# Patient Record
Sex: FEMALE | Race: WHITE | Employment: UNEMPLOYED | URBAN - METROPOLITAN AREA
[De-identification: names, ages, dates, MRNs, and addresses within clinical notes are randomized per-mention and may not be internally consistent; named-entity substitution may affect disease eponyms.]

---

## 2017-08-29 ENCOUNTER — CLINICAL SUPPORT (OUTPATIENT)
Dept: INTERNAL MEDICINE CLINIC | Age: 47
End: 2017-08-29

## 2017-08-29 VITALS
DIASTOLIC BLOOD PRESSURE: 113 MMHG | BODY MASS INDEX: 27.86 KG/M2 | HEART RATE: 82 BPM | SYSTOLIC BLOOD PRESSURE: 170 MMHG | RESPIRATION RATE: 16 BRPM | WEIGHT: 177.5 LBS | OXYGEN SATURATION: 96 % | TEMPERATURE: 98.1 F | HEIGHT: 67 IN

## 2017-08-29 DIAGNOSIS — Z01.30 BP CHECK: Primary | ICD-10-CM

## 2017-08-29 NOTE — PROGRESS NOTES
Nurse visit to recheck BP    1. Have you been to the ER, urgent care clinic since your last visit? Hospitalized since your last visit? No    2. Have you seen or consulted any other health care providers outside of the [address] [place] [address] since your last visit? Include any pap smears or colon screening.  No

## 2017-11-14 ENCOUNTER — OFFICE VISIT (OUTPATIENT)
Dept: INTERNAL MEDICINE CLINIC | Age: 47
End: 2017-11-14

## 2017-11-14 VITALS
DIASTOLIC BLOOD PRESSURE: 130 MMHG | BODY MASS INDEX: 27.15 KG/M2 | WEIGHT: 173 LBS | OXYGEN SATURATION: 96 % | SYSTOLIC BLOOD PRESSURE: 210 MMHG | HEIGHT: 67 IN | TEMPERATURE: 97.8 F | HEART RATE: 89 BPM | RESPIRATION RATE: 16 BRPM

## 2017-11-14 DIAGNOSIS — F41.9 ANXIETY AND DEPRESSION: ICD-10-CM

## 2017-11-14 DIAGNOSIS — I10 BENIGN ESSENTIAL HTN: ICD-10-CM

## 2017-11-14 DIAGNOSIS — J44.9 CHRONIC OBSTRUCTIVE PULMONARY DISEASE, UNSPECIFIED COPD TYPE (HCC): Primary | ICD-10-CM

## 2017-11-14 DIAGNOSIS — F32.A ANXIETY AND DEPRESSION: ICD-10-CM

## 2017-11-14 LAB
CHOLEST SERPL-MCNC: 122 MG/DL
HDLC SERPL-MCNC: 45 MG/DL
LDL CHOLESTEROL POC: 40 MG/DL
NON-HDL GOAL (POC): 77
TCHOL/HDL RATIO (POC): 2.7
TRIGL SERPL-MCNC: 182 MG/DL

## 2017-11-14 RX ORDER — CANDESARTAN 32 MG/1
32 TABLET ORAL DAILY
Qty: 30 TAB | Refills: 3 | Status: SHIPPED | OUTPATIENT
Start: 2017-11-14 | End: 2018-02-19 | Stop reason: SDUPTHER

## 2017-11-14 RX ORDER — ALPRAZOLAM 0.5 MG/1
0.5 TABLET ORAL 2 TIMES DAILY
Qty: 30 TAB | Refills: 0 | Status: CANCELLED | OUTPATIENT
Start: 2017-11-14

## 2017-11-14 RX ORDER — BUDESONIDE AND FORMOTEROL FUMARATE DIHYDRATE 160; 4.5 UG/1; UG/1
2 AEROSOL RESPIRATORY (INHALATION) 2 TIMES DAILY
Qty: 1 INHALER | Refills: 4 | Status: SHIPPED | OUTPATIENT
Start: 2017-11-14 | End: 2018-02-19 | Stop reason: SDUPTHER

## 2017-11-14 RX ORDER — CHLORTHALIDONE 25 MG/1
25 TABLET ORAL DAILY
Qty: 30 TAB | Refills: 3 | Status: SHIPPED | OUTPATIENT
Start: 2017-11-14 | End: 2018-02-19 | Stop reason: SDUPTHER

## 2017-11-14 RX ORDER — AMLODIPINE BESYLATE 5 MG/1
5 TABLET ORAL DAILY
Qty: 30 TAB | Refills: 3 | Status: SHIPPED | OUTPATIENT
Start: 2017-11-14 | End: 2018-05-17 | Stop reason: SDUPTHER

## 2017-11-14 RX ORDER — ALPRAZOLAM 0.5 MG/1
0.5 TABLET ORAL
Qty: 30 TAB | Refills: 0 | Status: SHIPPED | OUTPATIENT
Start: 2017-11-14 | End: 2017-12-06 | Stop reason: DRUGHIGH

## 2017-11-14 RX ORDER — ALBUTEROL SULFATE 90 UG/1
1 AEROSOL, METERED RESPIRATORY (INHALATION)
Qty: 1 INHALER | Refills: 3 | Status: SHIPPED | OUTPATIENT
Start: 2017-11-14 | End: 2018-02-19 | Stop reason: SDUPTHER

## 2017-11-14 NOTE — PROGRESS NOTES
Jaret Arnold is a 41 y.o. female and presents with No chief complaint on file. .  Subjective:  Hypertension Review:  The patient has essential hypertension  Diet and Lifestyle: generally follows a  low sodium diet, exercises sporadically  Home BP Monitoring: is not measured at home. Pertinent ROS: taking medications as instructed, no medication side effects noted, no TIA's, no chest pain on exertion, no dyspnea on exertion, no swelling of ankles. COPD REVIEW:  The patient is being seen for follow up of COPD,the patient has been stable  Oxygen: She currently is not on home oxygen therapy. Symptoms: chronic dyspnea: severity = not present: course of sx: stable. Patient uses 2 pillows at night. Patient does continue to smoke cigarettes. Anxiety review:  Patient complains of palpitations, sweating, paresthesias, insomnia, racing thoughts, psychomotor agitation, feelings of losing control, difficulty concentrating  Treatment includes Xanax and no other therapies. She denies recurrent thoughts of death and suicidal thoughts without plan. She experiences the following side effects from the treatment: none. Review of Systems  Constitutional: negative for fevers, chills, anorexia and weight loss  Eyes:   negative for visual disturbance and irritation  ENT:   negative for tinnitus,owing side effects from the treatment:  sore throat,nasal congestion,ear pains. hoarseness  Respiratory:  negative for cough, hemoptysis, dyspnea,wheezing  CV:   negative for chest pain, palpitations, lower extremity edema  GI:   negative for nausea, vomiting, diarrhea, abdominal pain,melena  Endo:               negative for polyuria,polydipsia,polyphagia,heat intolerance  Genitourinary: negative for frequency, dysuria and hematuria  Integument:  negative for rash and pruritus  Hematologic:  negative for easy bruising and gum/nose bleeding  Musculoskel: negative for myalgias, arthralgias, back pain, muscle weakness, joint pain  Neurological:  negative for headaches, dizziness, vertigo, memory problems and gait   Behavl/Psych: negative for feelings of anxiety, depression, mood changes    Past Medical History:   Diagnosis Date    Anemia 2012    Essential hypertension     HX OTHER MEDICAL     2009 baby put up for adoption, tested + for cocaine with that pregnancy    Hypertension      Past Surgical History:   Procedure Laterality Date    HX CHOLECYSTECTOMY      HX GYN           Social History     Social History    Marital status: LEGALLY      Spouse name: N/A    Number of children: N/A    Years of education: N/A     Social History Main Topics    Smoking status: Current Every Day Smoker     Packs/day: 0.50    Smokeless tobacco: Never Used    Alcohol use Yes    Drug use: No    Sexual activity: Yes     Birth control/ protection: None     Other Topics Concern    None     Social History Narrative     Family History   Problem Relation Age of Onset    Hypertension Father      Current Outpatient Prescriptions   Medication Sig Dispense Refill    candesartan (ATACAND) 32 mg tablet Take 1 Tab by mouth daily. 30 Tab 3    chlorthalidone (HYGROTEN) 25 mg tablet Take 1 Tab by mouth daily. 30 Tab 3    amLODIPine (NORVASC) 5 mg tablet Take 1 Tab by mouth daily. 30 Tab 3    budesonide-formoterol (SYMBICORT) 160-4.5 mcg/actuation HFAA Take 2 Puffs by inhalation two (2) times a day. 1 Inhaler 4    albuterol (PROVENTIL HFA, VENTOLIN HFA, PROAIR HFA) 90 mcg/actuation inhaler Take 1 Puff by inhalation every six (6) hours as needed for Wheezing or Shortness of Breath (cough). 1 Inhaler 3    ALPRAZolam (XANAX) 0.5 mg tablet Take 1 Tab by mouth nightly as needed for Anxiety. Max Daily Amount: 0.5 mg. 30 Tab 0    albuterol-ipratropium (DUO-NEB) 2.5 mg-0.5 mg/3 ml nebu 3 mL by Nebulization route every six (6) hours as needed.  50 mL 0     Allergies   Allergen Reactions    Hydralazine Unknown (comments)     jitteriness  Hydromorphone Itching     morphine    Morphine Itching    Sulfa (Sulfonamide Antibiotics) Nausea and Vomiting       Objective:  Visit Vitals    BP (!) 210/130    Pulse 89    Temp 97.8 °F (36.6 °C) (Oral)    Resp 16    Ht 5' 7\" (1.702 m)    Wt 173 lb (78.5 kg)    SpO2 96%    BMI 27.1 kg/m2     Physical Exam:   General appearance - alert, well appearing, and in no distress  Mental status - alert, oriented to person, place, and time  EYE-ROCKY, EOMI, corneas normal, no foreign bodies  ENT-ENT exam normal, no neck nodes or sinus tenderness  Nose - normal and patent, no erythema, discharge or polyps  Mouth - mucous membranes moist, pharynx normal without lesions  Neck - supple, no significant adenopathy   Chest - clear to auscultation, no wheezes, rales or rhonchi, symmetric air entry   Heart - normal rate, regular rhythm, normal S1, S2, no murmurs, rubs, clicks or gallops   Abdomen - soft, nontender, nondistended, no masses or organomegaly  Lymph- no adenopathy palpable  Ext-peripheral pulses normal, no pedal edema, no clubbing or cyanosis  Skin-Warm and dry. no hyperpigmentation, vitiligo, or suspicious lesions  Neuro -alert, oriented, normal speech, no focal findings or movement disorder noted  Neck-normal C-spine, no tenderness, full ROM without pain  Feet-no nail deformities or callus formation with good pulses noted      Results for orders placed or performed in visit on 11/14/17   AMB POC LIPID PROFILE   Result Value Ref Range    Cholesterol (POC) 122     Triglycerides (POC) 182     HDL Cholesterol (POC) 45     Non-HDL Goal (POC) 77     LDL Cholesterol (POC) 40 MG/DL    TChol/HDL Ratio (POC) 2.7        Assessment/Plan:    ICD-10-CM ICD-9-CM    1. Chronic obstructive pulmonary disease, unspecified COPD type (HCC) J44.9 496 albuterol (PROVENTIL HFA, VENTOLIN HFA, PROAIR HFA) 90 mcg/actuation inhaler   2.  Benign essential HTN I10 401.1 candesartan (ATACAND) 32 mg tablet      chlorthalidone (HYGROTEN) 25 mg tablet      amLODIPine (NORVASC) 5 mg tablet      budesonide-formoterol (SYMBICORT) 160-4.5 mcg/actuation HFAA      AMB POC LIPID PROFILE   3. Anxiety and depression F41.8 300.00 ALPRAZolam (XANAX) 0.5 mg tablet     311      Orders Placed This Encounter    AMB POC LIPID PROFILE    candesartan (ATACAND) 32 mg tablet     Sig: Take 1 Tab by mouth daily. Dispense:  30 Tab     Refill:  3    chlorthalidone (HYGROTEN) 25 mg tablet     Sig: Take 1 Tab by mouth daily. Dispense:  30 Tab     Refill:  3    amLODIPine (NORVASC) 5 mg tablet     Sig: Take 1 Tab by mouth daily. Dispense:  30 Tab     Refill:  3    budesonide-formoterol (SYMBICORT) 160-4.5 mcg/actuation HFAA     Sig: Take 2 Puffs by inhalation two (2) times a day. Dispense:  1 Inhaler     Refill:  4    albuterol (PROVENTIL HFA, VENTOLIN HFA, PROAIR HFA) 90 mcg/actuation inhaler     Sig: Take 1 Puff by inhalation every six (6) hours as needed for Wheezing or Shortness of Breath (cough). Dispense:  1 Inhaler     Refill:  3    ALPRAZolam (XANAX) 0.5 mg tablet     Sig: Take 1 Tab by mouth nightly as needed for Anxiety. Max Daily Amount: 0.5 mg.     Dispense:  30 Tab     Refill:  0     increase physical activity, stop smoking (advice and handout given), follow low fat diet, follow low salt diet,Take 81mg aspirin daily  Patient Instructions   Acme Packet Activation    Thank you for requesting access to Acme [employer]. Please follow the instructions below to securely access and download your online medical record. Acme Packet allows you to send messages to your doctor, view your test results, renew your prescriptions, schedule appointments, and more. How Do I Sign Up? 1. In your internet browser, go to [email or web address]  2. Click on the First Time User? Click Here link in the Sign In box. You will be redirect to the New Member Sign Up page. 3. Enter your Acme Packet Access Code exactly as it appears below.  You will not need to use this code after youve completed the sign-up process. If you do not sign up before the expiration date, you must request a new code. [employer] Access Code: [email or web address]  Expires: 2017  8:47 AM (This is the date your [employer] access code will )    4. Enter the last four digits of your Social Security Number (xxxx) and Date of Birth (mm/dd/yyyy) as indicated and click Submit. You will be taken to the next sign-up page. 5. Create a Formative Labst ID. This will be your [employer] login ID and cannot be changed, so think of one that is secure and easy to remember. 6. Create a [employer] password. You can change your password at any time. 7. Enter your Password Reset Question and Answer. This can be used at a later time if you forget your password. 8. Enter your e-mail address. You will receive e-mail notification when new information is available in [address] 9. Click Sign Up. You can now view and download portions of your medical record. 10. Click the Download Summary menu link to download a portable copy of your medical information. Additional Information    If you have questions, please visit the Frequently Asked Questions section of the [employer] website at [email or web address] Remember, [employer] is NOT to be used for urgent needs. For medical emergencies, dial 911. Follow-up Disposition:  Return in about 1 week (around 2017), or if symptoms worsen or fail to improve. I have reviewed with the patient details of the assessment and plan and all questions were answered. Relevent patient education was performed. The most recent lab findings were reviewed with the patient. An After Visit Summary was printed and given to the patient.

## 2017-11-14 NOTE — MR AVS SNAPSHOT
Visit Information Date & Time Provider Department Dept. Phone Encounter #  
 11/14/2017  9:15 AM Cha Sosa MD [address] [place] [address] [phone] [id number] Follow-up Instructions Return in about 1 week (around 11/21/2017), or if symptoms worsen or fail to improve. Upcoming Health Maintenance Date Due  
 PAP AKA CERVICAL CYTOLOGY 4/4/1993 DTaP/Tdap/Td series (2 - Td) 8/11/2022 Allergies as of 11/14/2017  Review Complete On: 11/14/2017 By: Cha Sosa MD  
  
 Severity Noted Reaction Type Reactions Hydralazine  02/23/2012   Intolerance Unknown (comments)  
 jitteriness Hydromorphone  06/01/2011    Itching  
 morphine Morphine  02/14/2012    Itching Sulfa (Sulfonamide Antibiotics)  06/01/2011    Nausea and Vomiting Current Immunizations  Reviewed on 2/16/2012 Name Date Influenza Vaccine Split  Deferred (Patient Refused) RHOGAM INJECTION 2/17/2012  8:45 PM  
 TDAP Vaccine 8/11/2012  7:15 AM  
  
 Not reviewed this visit You Were Diagnosed With   
  
 Codes Comments Chronic obstructive pulmonary disease, unspecified COPD type ([facility]    -  Primary ICD-10-CM: J44.9 ICD-9-CM: 582 Benign essential HTN     ICD-10-CM: I10 
ICD-9-CM: 401.1 Anxiety and depression     ICD-10-CM: F41.8 ICD-9-CM: 300.00, 311 Vitals BP Pulse Temp Resp Height(growth percentile) Weight(growth percentile) (!) 210/130 89 97.8 °F (36.6 °C) (Oral) 16 5' 7\" (1.702 m) 173 lb (78.5 kg) SpO2 BMI OB Status Smoking Status 96% 27.1 kg/m2 Having regular periods Current Every Day Smoker Vitals History BMI and BSA Data Body Mass Index Body Surface Area  
 27.1 kg/m 2 1.93 m 2 [facility] [address], [address] [phone] Your Updated Medication List  
  
   
 This list is accurate as of: 11/14/17 10:12 AM.  Always use your most recent med list.  
  
  
  
  
 albuterol 90 mcg/actuation inhaler Commonly known as:  PROVENTIL HFA, VENTOLIN HFA, PROAIR HFA Take 1 Puff by inhalation every six (6) hours as needed for Wheezing or Shortness of Breath (cough). albuterol-ipratropium 2.5 mg-0.5 mg/3 ml Nebu Commonly known as:  DUO-NEB  
3 mL by Nebulization route every six (6) hours as needed. ALPRAZolam 0.5 mg tablet Commonly known as:  [place] Amend Take 1 Tab by mouth nightly as needed for Anxiety. Max Daily Amount: 0.5 mg.  
  
 amLODIPine 5 mg tablet Commonly known as:  Eusebia Croon Take 1 Tab by mouth daily. budesonide-formoterol 160-4.5 mcg/actuation Hfaa Commonly known as:  SYMBICORT Take 2 Puffs by inhalation two (2) times a day. candesartan 32 mg tablet Commonly known as:  ATACAND Take 1 Tab by mouth daily. chlorthalidone 25 mg tablet Commonly known as:  Lesli Bolk Take 1 Tab by mouth daily. Prescriptions Printed Refills ALPRAZolam (XANAX) 0.5 mg tablet 0 Sig: Take 1 Tab by mouth nightly as needed for Anxiety. Max Daily Amount: 0.5 mg.  
 Class: Print Route: Oral  
  
Prescriptions Sent to Pharmacy Refills  
 candesartan (ATACAND) 32 mg tablet 3 Sig: Take 1 Tab by mouth daily. Class: Normal  
 Pharmacy: [facility] [address] [facility], [address] [place] [address] Ph #: [phone] Route: Oral  
 chlorthalidone (HYGROTEN) 25 mg tablet 3 Sig: Take 1 Tab by mouth daily. Class: Normal  
 Pharmacy: [facility] [address], [address] [place] [address] Ph #: [phone] Route: Oral  
 amLODIPine (NORVASC) 5 mg tablet 3 Sig: Take 1 Tab by mouth daily. Class: Normal  
 Pharmacy: [facility] [address], [address] [place] [address] Ph #: [phone]  Route: Oral  
 budesonide-formoterol (SYMBICORT) 160-4.5 mcg/actuation HFAA 4  
 Sig: Take 2 Puffs by inhalation two (2) times a day. Class: Normal  
 Pharmacy: [facility] [address] [facility], [address] [place] [address] Ph #: [phone] Route: Inhalation  
 albuterol (PROVENTIL HFA, VENTOLIN HFA, PROAIR HFA) 90 mcg/actuation inhaler 3 Sig: Take 1 Puff by inhalation every six (6) hours as needed for Wheezing or Shortness of Breath (cough). Class: Normal  
 Pharmacy: [facility] [address] [place] [address] Ph #: [phone] Route: Inhalation We Performed the Following AMB POC LIPID PROFILE [07185 CPT(R)] Follow-up Instructions Return in about 1 week (around 2017), or if symptoms worsen or fail to improve. Patient Instructions MyChart Activation Thank you for requesting access to MobileOCT. Please follow the instructions below to securely access and download your online medical record. MobileOCT allows you to send messages to your doctor, view your test results, renew your prescriptions, schedule appointments, and more. How Do I Sign Up? 1. In your internet browser, go to [email or web address] 
2. Click on the First Time User? Click Here link in the Sign In box. You will be redirect to the New Member Sign Up page. 3. Enter your MobileOCT Access Code exactly as it appears below. You will not need to use this code after youve completed the sign-up process. If you do not sign up before the expiration date, you must request a new code. [employer] Access Code: [email or web address] Expires: 2017  8:47 AM (This is the date your MobileOCT access code will ) 4. Enter the last four digits of your Social Security Number (xxxx) and Date of Birth (mm/dd/yyyy) as indicated and click Submit. You will be taken to the next sign-up page. 5. Create a MobileOCT ID. This will be your MobileOCT login ID and cannot be changed, so think of one that is secure and easy to remember. 6. Create a "[employer] password. You can change your password at any time. 7. Enter your Password Reset Question and Answer. This can be used at a later time if you forget your password. 8. Enter your e-mail address. You will receive e-mail notification when new information is available in [address] 9. Click Sign Up. You can now view and download portions of your medical record. 10. Click the Download Summary menu link to download a portable copy of your medical information. Additional Information If you have questions, please visit the Frequently Asked Questions section of the "[employer] website at [email or web address] Remember, "[employer] is NOT to be used for urgent needs. For medical emergencies, dial 911. Introducing [facility] SERVICES! Barbara Savage introduces "[employer] patient portal. Now you can access parts of your medical record, email your doctor's office, and request medication refills online. 1. In your internet browser, go to [email or web address] 2. Click on the First Time User? Click Here link in the Sign In box. You will see the New Member Sign Up page. 3. Enter your "[employer] Access Code exactly as it appears below. You will not need to use this code after youve completed the sign-up process. If you do not sign up before the expiration date, you must request a new code. · "[employer] Access Code: [email or web address] Expires: 11/27/2017  8:47 AM 
 
4. Enter the last four digits of your Social Security Number (xxxx) and Date of Birth (mm/dd/yyyy) as indicated and click Submit. You will be taken to the next sign-up page. 5. Create a "[employer] ID. This will be your "[employer] login ID and cannot be changed, so think of one that is secure and easy to remember. 6. Create a "[employer] password. You can change your password at any time. 7. Enter your Password Reset Question and Answer. This can be used at a later time if you forget your password. 8. Enter your e-mail address. You will receive e-mail notification when new information is available in [address] 9. Click Sign Up. You can now view and download portions of your medical record. 10. Click the Download Summary menu link to download a portable copy of your medical information. If you have questions, please visit the Frequently Asked Questions section of the [employer] website. Remember, [employer] is NOT to be used for urgent needs. For medical emergencies, dial 911. Now available from your iPhone and Android! Please provide this summary of care documentation to your next provider. Your primary care clinician is listed as Bang Fillers. If you have any questions after today's visit, please call [phone].

## 2017-11-14 NOTE — PATIENT INSTRUCTIONS
Reorg Research Activation    Thank you for requesting access to [employer]. Please follow the instructions below to securely access and download your online medical record. [employer] allows you to send messages to your doctor, view your test results, renew your prescriptions, schedule appointments, and more. How Do I Sign Up? 1. In your internet browser, go to [email or web address]  2. Click on the First Time User? Click Here link in the Sign In box. You will be redirect to the New Member Sign Up page. 3. Enter your [employer] Access Code exactly as it appears below. You will not need to use this code after youve completed the sign-up process. If you do not sign up before the expiration date, you must request a new code. [employer] Access Code: [email or web address]  Expires: 2017  8:47 AM (This is the date your [employer] access code will )    4. Enter the last four digits of your Social Security Number (xxxx) and Date of Birth (mm/dd/yyyy) as indicated and click Submit. You will be taken to the next sign-up page. 5. Create a Re[employer] Research ID. This will be your [employer] login ID and cannot be changed, so think of one that is secure and easy to remember. 6. Create a Re[employer] password. You can change your password at any time. 7. Enter your Password Reset Question and Answer. This can be used at a later time if you forget your password. 8. Enter your e-mail address. You will receive e-mail notification when new information is available in [address] 9. Click Sign Up. You can now view and download portions of your medical record. 10. Click the Download Summary menu link to download a portable copy of your medical information. Additional Information    If you have questions, please visit the Frequently Asked Questions section of the Re[employer] Research website at [email or web address] Remember, [employer] is NOT to be used for urgent needs. For medical emergencies, dial 911.

## 2017-11-27 ENCOUNTER — OFFICE VISIT (OUTPATIENT)
Dept: INTERNAL MEDICINE CLINIC | Age: 47
End: 2017-11-27

## 2017-11-27 VITALS
WEIGHT: 174 LBS | OXYGEN SATURATION: 97 % | DIASTOLIC BLOOD PRESSURE: 82 MMHG | TEMPERATURE: 98.2 F | BODY MASS INDEX: 27.31 KG/M2 | SYSTOLIC BLOOD PRESSURE: 144 MMHG | HEIGHT: 67 IN | HEART RATE: 74 BPM

## 2017-11-27 DIAGNOSIS — F41.9 ANXIETY AND DEPRESSION: ICD-10-CM

## 2017-11-27 DIAGNOSIS — J44.9 CHRONIC OBSTRUCTIVE PULMONARY DISEASE, UNSPECIFIED COPD TYPE (HCC): ICD-10-CM

## 2017-11-27 DIAGNOSIS — F32.A ANXIETY AND DEPRESSION: ICD-10-CM

## 2017-11-27 DIAGNOSIS — I10 BENIGN ESSENTIAL HTN: Primary | ICD-10-CM

## 2017-11-27 NOTE — PATIENT INSTRUCTIONS
BioActor Activation    Thank you for requesting access to BioActor. Please follow the instructions below to securely access and download your online medical record. BioActor allows you to send messages to your doctor, view your test results, renew your prescriptions, schedule appointments, and more. How Do I Sign Up? 1. In your internet browser, go to [email or web address]  2. Click on the First Time User? Click Here link in the Sign In box. You will be redirect to the New Member Sign Up page. 3. Enter your BioActor Access Code exactly as it appears below. You will not need to use this code after youve completed the sign-up process. If you do not sign up before the expiration date, you must request a new code. BioActor Access Code: [email or web address]  Expires: 2018  3:57 PM (This is the date your BioActor access code will )    4. Enter the last four digits of your Social Security Number (xxxx) and Date of Birth (mm/dd/yyyy) as indicated and click Submit. You will be taken to the next sign-up page. 5. Create a BioActor ID. This will be your BioActor login ID and cannot be changed, so think of one that is secure and easy to remember. 6. Create a BioActor password. You can change your password at any time. 7. Enter your Password Reset Question and Answer. This can be used at a later time if you forget your password. 8. Enter your e-mail address. You will receive e-mail notification when new information is available in [address] 9. Click Sign Up. You can now view and download portions of your medical record. 10. Click the Download Summary menu link to download a portable copy of your medical information. Additional Information    If you have questions, please visit the Frequently Asked Questions section of the BioActor website at [email or web address] Remember, BioActor is NOT to be used for urgent needs. For medical emergencies, dial 911.

## 2017-11-27 NOTE — PROGRESS NOTES
Mireya Parsons is a 41 y.o. female and presents with Hypertension  . Subjective:  Hypertension Review:  The patient has essential hypertension,she has been stable  Diet and Lifestyle: generally follows a  low sodium diet, exercises sporadically  Home BP Monitoring: is not measured at home. Pertinent ROS: taking medications as instructed, no medication side effects noted, no TIA's, no chest pain on exertion, no dyspnea on exertion, no swelling of ankles. COPD REVIEW:  The patient is being seen for follow up of COPD,the patient has been stable  Oxygen: She currently is not on home oxygen therapy. Symptoms: chronic dyspnea: severity = not present: course of sx: stable. Patient uses 2 pillows at night. Patient does continue to smoke cigarettes. Anxiety review:  Patient complains of palpitations, sweating, paresthesias, insomnia, racing thoughts, psychomotor agitation, feelings of losing control, difficulty concentrating  Treatment includes Xanax and no other therapies. She denies recurrent thoughts of death and suicidal thoughts without plan. She experiences the following side effects from the treatment: none. She has been tolerating the Xanax . Review of Systems  Constitutional: negative for fevers, chills, anorexia and weight loss  Eyes:   negative for visual disturbance and irritation  ENT:   negative for tinnitus,owing side effects from the treatment:  sore throat,nasal congestion,ear pains. hoarseness  Respiratory:  negative for cough, hemoptysis, dyspnea,wheezing  CV:   negative for chest pain, palpitations, lower extremity edema  GI:   negative for nausea, vomiting, diarrhea, abdominal pain,melena  Endo:               negative for polyuria,polydipsia,polyphagia,heat intolerance  Genitourinary: negative for frequency, dysuria and hematuria  Integument:  negative for rash and pruritus  Hematologic:  negative for easy bruising and gum/nose bleeding  Musculoskel: negative for myalgias, arthralgias, back pain, muscle weakness, joint pain  Neurological:  negative for headaches, dizziness, vertigo, memory problems and gait   Behavl/Psych: negative for feelings of anxiety, depression, mood changes    Past Medical History:   Diagnosis Date    Anemia 2012    Essential hypertension     HX OTHER MEDICAL     2009 baby put up for adoption, tested + for cocaine with that pregnancy    Hypertension      Past Surgical History:   Procedure Laterality Date    HX CHOLECYSTECTOMY      HX GYN           Social History     Social History    Marital status: LEGALLY      Spouse name: N/A    Number of children: N/A    Years of education: N/A     Social History Main Topics    Smoking status: Current Every Day Smoker     Packs/day: 0.50    Smokeless tobacco: Never Used    Alcohol use Yes    Drug use: No    Sexual activity: Yes     Birth control/ protection: None     Other Topics Concern    None     Social History Narrative     Family History   Problem Relation Age of Onset    Hypertension Father      Current Outpatient Prescriptions   Medication Sig Dispense Refill    candesartan (ATACAND) 32 mg tablet Take 1 Tab by mouth daily. 30 Tab 3    chlorthalidone (HYGROTEN) 25 mg tablet Take 1 Tab by mouth daily. 30 Tab 3    amLODIPine (NORVASC) 5 mg tablet Take 1 Tab by mouth daily. 30 Tab 3    budesonide-formoterol (SYMBICORT) 160-4.5 mcg/actuation HFAA Take 2 Puffs by inhalation two (2) times a day. 1 Inhaler 4    albuterol (PROVENTIL HFA, VENTOLIN HFA, PROAIR HFA) 90 mcg/actuation inhaler Take 1 Puff by inhalation every six (6) hours as needed for Wheezing or Shortness of Breath (cough). 1 Inhaler 3    ALPRAZolam (XANAX) 0.5 mg tablet Take 1 Tab by mouth nightly as needed for Anxiety. Max Daily Amount: 0.5 mg. 30 Tab 0    albuterol-ipratropium (DUO-NEB) 2.5 mg-0.5 mg/3 ml nebu 3 mL by Nebulization route every six (6) hours as needed.  50 mL 0     Allergies   Allergen Reactions    Hydralazine Unknown (comments)     jitteriness    Hydromorphone Itching     morphine    Morphine Itching    Sulfa (Sulfonamide Antibiotics) Nausea and Vomiting       Objective:  Visit Vitals    /82 (BP 1 Location: Left arm, BP Patient Position: Sitting)    Pulse 74    Temp 98.2 °F (36.8 °C) (Oral)    Ht 5' 7\" (1.702 m)    Wt 174 lb (78.9 kg)    LMP 10/24/2017    SpO2 97%    BMI 27.25 kg/m2     Physical Exam:   General appearance - alert, well appearing, and in no distress  Mental status - alert, oriented to person, place, and time  EYE-ROCKY, EOMI, corneas normal, no foreign bodies  ENT-ENT exam normal, no neck nodes or sinus tenderness  Nose - normal and patent, no erythema, discharge or polyps  Mouth - mucous membranes moist, pharynx normal without lesions  Neck - supple, no significant adenopathy   Chest - clear to auscultation, no wheezes, rales or rhonchi, symmetric air entry   Heart - normal rate, regular rhythm, normal S1, S2, no murmurs, rubs, clicks or gallops   Abdomen - soft, nontender, nondistended, no masses or organomegaly  Lymph- no adenopathy palpable  Ext-peripheral pulses normal, no pedal edema, no clubbing or cyanosis  Skin-Warm and dry. no hyperpigmentation, vitiligo, or suspicious lesions  Neuro -alert, oriented, normal speech, no focal findings or movement disorder noted  Neck-normal C-spine, no tenderness, full ROM without pain  Feet-no nail deformities or callus formation with good pulses noted      Results for orders placed or performed in visit on 11/14/17   AMB POC LIPID PROFILE   Result Value Ref Range    Cholesterol (POC) 122     Triglycerides (POC) 182     HDL Cholesterol (POC) 45     Non-HDL Goal (POC) 77     LDL Cholesterol (POC) 40 MG/DL    TChol/HDL Ratio (POC) 2.7        Assessment/Plan:    ICD-10-CM ICD-9-CM    1. Benign essential HTN I10 401.1    2. Chronic obstructive pulmonary disease, unspecified COPD type ([facility].) J44.9 496    3.  Anxiety and depression F41.8 300.00      311      No orders of the defined types were placed in this encounter. increase physical activity, stop smoking (advice and handout given), follow low fat diet, follow low salt diet,Take 81mg aspirin daily  Patient Instructions   MyChart Activation    Thank you for requesting access to [employer]. Please follow the instructions below to securely access and download your online medical record. [employer] allows you to send messages to your doctor, view your test results, renew your prescriptions, schedule appointments, and more. How Do I Sign Up? 1. In your internet browser, go to [email or web address]  2. Click on the First Time User? Click Here link in the Sign In box. You will be redirect to the New Member Sign Up page. 3. Enter your [employer] Access Code exactly as it appears below. You will not need to use this code after youve completed the sign-up process. If you do not sign up before the expiration date, you must request a new code. [facility] Access Code: [email or web address]  Expires: 2018  3:57 PM (This is the date your [employer] access code will )    4. Enter the last four digits of your Social Security Number (xxxx) and Date of Birth (mm/dd/yyyy) as indicated and click Submit. You will be taken to the next sign-up page. 5. Create a [facility] ID. This will be your [employer] login ID and cannot be changed, so think of one that is secure and easy to remember. 6. Create a [employer] password. You can change your password at any time. 7. Enter your Password Reset Question and Answer. This can be used at a later time if you forget your password. 8. Enter your e-mail address. You will receive e-mail notification when new information is available in [address] 9. Click Sign Up. You can now view and download portions of your medical record. 10. Click the Download Summary menu link to download a portable copy of your medical information.     Additional Information    If you have questions, please visit the Frequently Asked Questions section of the [employer] website at [email or web address] Remember, [employer] is NOT to be used for urgent needs. For medical emergencies, dial 911. Follow-up Disposition:  Return in about 4 weeks (around 12/25/2017). I have reviewed with the patient details of the assessment and plan and all questions were answered. Relevent patient education was performed. The most recent lab findings were reviewed with the patient. An After Visit Summary was printed and given to the patient.

## 2017-11-27 NOTE — MR AVS SNAPSHOT
Visit Information Date & Time Provider Department Dept. Phone Encounter #  
 11/27/2017  3:15 PM Uma Bill MD [address] [facility] [phone] [id number] Follow-up Instructions Return in about 4 weeks (around 12/25/2017). Follow-up and Disposition History Upcoming Health Maintenance Date Due  
 PAP AKA CERVICAL CYTOLOGY 4/4/1993 DTaP/Tdap/Td series (2 - Td) 8/11/2022 Allergies as of 11/27/2017  Review Complete On: 11/27/2017 By: Uma Bill MD  
  
 Severity Noted Reaction Type Reactions Hydralazine  02/23/2012   Intolerance Unknown (comments)  
 jitteriness Hydromorphone  06/01/2011    Itching  
 morphine Morphine  02/14/2012    Itching Sulfa (Sulfonamide Antibiotics)  06/01/2011    Nausea and Vomiting Current Immunizations  Reviewed on 2/16/2012 Name Date Influenza Vaccine Split  Deferred (Patient Refused) RHOGAM INJECTION 2/17/2012  8:45 PM  
 TDAP Vaccine 8/11/2012  7:15 AM  
  
 Not reviewed this visit You Were Diagnosed With   
  
 Codes Comments Benign essential HTN    -  Primary ICD-10-CM: I10 
ICD-9-CM: 414. 1 Chronic obstructive pulmonary disease, unspecified COPD type ([facility] 75.)     ICD-10-CM: J44.9 ICD-9-CM: 954 Anxiety and depression     ICD-10-CM: F41.8 ICD-9-CM: 300.00, 311 Vitals BP Pulse Temp Height(growth percentile) Weight(growth percentile) LMP  
 144/82 (BP 1 Location: Left arm, BP Patient Position: Sitting) 74 98.2 °F (36.8 °C) (Oral) 5' 7\" (1.702 m) 174 lb (78.9 kg) 10/24/2017 SpO2 BMI OB Status Smoking Status 97% 27.25 kg/m2 Having regular periods Current Every Day Smoker Vitals History BMI and BSA Data Body Mass Index Body Surface Area  
 27.25 kg/m 2 1.93 m 2 Preferred Pharmacy Pharmacy Name Phone [facility] [address], [address] [phone] Your Updated Medication List  
  
   
 This list is accurate as of: 11/27/17  3:59 PM.  Always use your most recent med list.  
  
  
  
  
 albuterol 90 mcg/actuation inhaler Commonly known as:  PROVENTIL HFA, VENTOLIN HFA, PROAIR HFA Take 1 Puff by inhalation every six (6) hours as needed for Wheezing or Shortness of Breath (cough). albuterol-ipratropium 2.5 mg-0.5 mg/3 ml Nebu Commonly known as:  DUO-NEB  
3 mL by Nebulization route every six (6) hours as needed. ALPRAZolam 0.5 mg tablet Commonly known as:  Troy Ingrid Take 1 Tab by mouth nightly as needed for Anxiety. Max Daily Amount: 0.5 mg.  
  
 amLODIPine 5 mg tablet Commonly known as:  Verito Brenda Take 1 Tab by mouth daily. budesonide-formoterol 160-4.5 mcg/actuation Hfaa Commonly known as:  SYMBICORT Take 2 Puffs by inhalation two (2) times a day. candesartan 32 mg tablet Commonly known as:  ATACAND Take 1 Tab by mouth daily. chlorthalidone 25 mg tablet Commonly known as:  Kimmy Pulling Take 1 Tab by mouth daily. Follow-up Instructions Return in about 4 weeks (around 12/25/2017). Patient Instructions [facility] Activation Thank you for requesting access to [facility]. Please follow the instructions below to securely access and download your online medical record. [facility] allows you to send messages to your doctor, view your test results, renew your prescriptions, schedule appointments, and more. How Do I Sign Up? 1. In your internet browser, go to [email or web address] Borges Urzeda 
2. Click on the First Time User? Click Here link in the Sign In box. You will be redirect to the New Member Sign Up page. 3. Enter your [facility] Access Code exactly as it appears below. You will not need to use this code after youve completed the sign-up process. If you do not sign up before the expiration date, you must request a new code. [facility] Access Code: [email or web address] Expires: 2/25/2018  3:57 PM (This is the date your [facility] access code will ) 4. Enter the last four digits of your Social Security Number (xxxx) and Date of Birth (mm/dd/yyyy) as indicated and click Submit. You will be taken to the next sign-up page. 5. Create a [employer] ID. This will be your [employer] login ID and cannot be changed, so think of one that is secure and easy to remember. 6. Create a [employer] password. You can change your password at any time. 7. Enter your Password Reset Question and Answer. This can be used at a later time if you forget your password. 8. Enter your e-mail address. You will receive e-mail notification when new information is available in [address] 9. Click Sign Up. You can now view and download portions of your medical record. 10. Click the Download Summary menu link to download a portable copy of your medical information. Additional Information If you have questions, please visit the Frequently Asked Questions section of the [employer] website at [email or web address] / [email or web address]/. Remember, [employer] is NOT to be used for urgent needs. For medical emergencies, dial 911. Introducing [facility]! [employer] introduces [employer] patient portal. Now you can access parts of your medical record, email your doctor's office, and request medication refills online. 1. In your internet browser, go to [email or web address] [email or web address] 2. Click on the First Time User? Click Here link in the Sign In box. You will see the New Member Sign Up page. 3. Enter your [employer] Access Code exactly as it appears below. You will not need to use this code after youve completed the sign-up process. If you do not sign up before the expiration date, you must request a new code. · [employer] Access Code: [email or web address] Expires: 2018  3:57 PM 
 
4. Enter the last four digits of your Social Security Number (xxxx) and Date of Birth (mm/dd/yyyy) as indicated and click Submit. You will be taken to the next sign-up page. 5. Create a [employer] ID. This will be your [employer] login ID and cannot be changed, so think of one that is secure and easy to remember. 6. Create a [employer] password. You can change your password at any time. 7. Enter your Password Reset Question and Answer. This can be used at a later time if you forget your password. 8. Enter your e-mail address. You will receive e-mail notification when new information is available in [address] 9. Click Sign Up. You can now view and download portions of your medical record. 10. Click the Download Summary menu link to download a portable copy of your medical information. If you have questions, please visit the Frequently Asked Questions section of the [employer] website. Remember, [employer] is NOT to be used for urgent needs. For medical emergencies, dial 911. Now available from your iPhone and Android! Please provide this summary of care documentation to your next provider. Your primary care clinician is listed as Chriss Cormier. If you have any questions after today's visit, please call [phone].

## 2017-12-06 ENCOUNTER — OFFICE VISIT (OUTPATIENT)
Dept: INTERNAL MEDICINE CLINIC | Age: 47
End: 2017-12-06

## 2017-12-06 VITALS
DIASTOLIC BLOOD PRESSURE: 70 MMHG | WEIGHT: 167 LBS | RESPIRATION RATE: 17 BRPM | BODY MASS INDEX: 26.21 KG/M2 | SYSTOLIC BLOOD PRESSURE: 120 MMHG | TEMPERATURE: 98.3 F | HEIGHT: 67 IN | HEART RATE: 77 BPM | OXYGEN SATURATION: 98 %

## 2017-12-06 DIAGNOSIS — I10 ESSENTIAL HYPERTENSION: Primary | ICD-10-CM

## 2017-12-06 DIAGNOSIS — F32.A ANXIETY AND DEPRESSION: ICD-10-CM

## 2017-12-06 DIAGNOSIS — F41.9 ANXIETY AND DEPRESSION: ICD-10-CM

## 2017-12-06 RX ORDER — ALPRAZOLAM 0.5 MG/1
0.5 TABLET ORAL 2 TIMES DAILY
Qty: 60 TAB | Refills: 1 | Status: SHIPPED | OUTPATIENT
Start: 2017-12-06 | End: 2018-02-19 | Stop reason: SDUPTHER

## 2017-12-06 NOTE — PATIENT INSTRUCTIONS
[employer] Activation    Thank you for requesting access to [employer]. Please follow the instructions below to securely access and download your online medical record. [employer] allows you to send messages to your doctor, view your test results, renew your prescriptions, schedule appointments, and more. How Do I Sign Up? 1. In your internet browser, go to [email or web address]  2. Click on the First Time User? Click Here link in the Sign In box. You will be redirect to the New Member Sign Up page. 3. Enter your [employer]z Access Code exactly as it appears below. You will not need to use this code after youve completed the sign-up process. If you do not sign up before the expiration date, you must request a new code. [employer] Access Code: [email or web address]  Expires: 2018  3:57 PM (This is the date your [employer]z access code will )    4. Enter the last four digits of your Social Security Number (xxxx) and Date of Birth (mm/dd/yyyy) as indicated and click Submit. You will be taken to the next sign-up page. 5. Create a Greetz ID. This will be your Greetz login ID and cannot be changed, so think of one that is secure and easy to remember. 6. Create a Greetz password. You can change your password at any time. 7. Enter your Password Reset Question and Answer. This can be used at a later time if you forget your password. 8. Enter your e-mail address. You will receive e-mail notification when new information is available in [address] 9. Click Sign Up. You can now view and download portions of your medical record. 10. Click the Download Summary menu link to download a portable copy of your medical information. Additional Information    If you have questions, please visit the Frequently Asked Questions section of the [employer]z website at [email or web address] [email or web address] Grouphart/. Remember, [employer] is NOT to be used for urgent needs. For medical emergencies, dial 911.

## 2018-01-22 ENCOUNTER — OFFICE VISIT (OUTPATIENT)
Dept: INTERNAL MEDICINE CLINIC | Age: 48
End: 2018-01-22

## 2018-01-22 VITALS
OXYGEN SATURATION: 96 % | SYSTOLIC BLOOD PRESSURE: 124 MMHG | HEART RATE: 92 BPM | HEIGHT: 67 IN | TEMPERATURE: 98.9 F | WEIGHT: 176 LBS | DIASTOLIC BLOOD PRESSURE: 90 MMHG | RESPIRATION RATE: 16 BRPM | BODY MASS INDEX: 27.62 KG/M2

## 2018-01-22 DIAGNOSIS — I10 ESSENTIAL HYPERTENSION: ICD-10-CM

## 2018-01-22 DIAGNOSIS — F41.9 ANXIETY AND DEPRESSION: Primary | ICD-10-CM

## 2018-01-22 DIAGNOSIS — F32.A ANXIETY AND DEPRESSION: Primary | ICD-10-CM

## 2018-01-23 NOTE — PATIENT INSTRUCTIONS
[employer] Activation    Thank you for requesting access to [employer]. Please follow the instructions below to securely access and download your online medical record. [employer] allows you to send messages to your doctor, view your test results, renew your prescriptions, schedule appointments, and more. How Do I Sign Up? 1. In your internet browser, go to [email or web address]  2. Click on the First Time User? Click Here link in the Sign In box. You will be redirect to the New Member Sign Up page. 3. Enter your [employer] Access Code exactly as it appears below. You will not need to use this code after youve completed the sign-up process. If you do not sign up before the expiration date, you must request a new code. [employer] Access Code: [email or web address]  Expires: 2018  3:57 PM (This is the date your [employer] access code will )    4. Enter the last four digits of your Social Security Number (xxxx) and Date of Birth (mm/dd/yyyy) as indicated and click Submit. You will be taken to the next sign-up page. 5. Create a [employer] ID. This will be your [employer] login ID and cannot be changed, so think of one that is secure and easy to remember. 6. Create a [employer] password. You can change your password at any time. 7. Enter your Password Reset Question and Answer. This can be used at a later time if you forget your password. 8. Enter your e-mail address. You will receive e-mail notification when new information is available in [address] 9. Click Sign Up. You can now view and download portions of your medical record. 10. Click the Download Summary menu link to download a portable copy of your medical information. Additional Information    If you have questions, please visit the Frequently Asked Questions section of the [employer] website at [email or web address]/. Remember, [employer] is NOT to be used for urgent needs. For medical emergencies, dial 911.

## 2018-01-23 NOTE — PROGRESS NOTES
Santiago Aguila is a 41 y.o. female and presents with Anxiety; Medication Refill; and Hypertension  . Subjective:  Depression Review:  Patient is seen for followup of depression. Ongoing depressed mood, hypersomnia, psychomotor agitation, psychomotor retardation, feelings of worthlessness/guilt, difficulty concentrating and hopelessness,she has had less crying spells and mood changes. Treatment includes Xanax and no other therapies. She states she has a psy appointment soon. She denies recurrent thoughts of death and suicidal thoughts without plan. She experiences the following side effects from the treatment: none. Hypertension Review:  The patient has hypertension . Diet and Lifestyle: generally follows a low sodium diet, exercises sporadically  Home BP Monitoring: is not measured at home. Pertinent ROS: taking medications as instructed, no medication side effects noted, no TIA's, no chest pain on exertion, no dyspnea on exertion, no swelling of ankles. Review of Systems  Constitutional: negative for fevers, chills, anorexia and weight loss  Eyes:   negative for visual disturbance and irritation  ENT:   negative for tinnitus,sore throat,nasal congestion,ear pains. hoarseness  Respiratory:  negative for cough, hemoptysis, dyspnea,wheezing  CV:   negative for chest pain, palpitations, lower extremity edema  GI:   negative for nausea, vomiting, diarrhea, abdominal pain,melena  Endo:               negative for polyuria,polydipsia,polyphagia,heat intolerance  Genitourinary: negative for frequency, dysuria and hematuria  Integument:  negative for rash and pruritus  Hematologic:  negative for easy bruising and gum/nose bleeding  Musculoskel: negative for myalgias, arthralgias, back pain, muscle weakness, joint pain  Neurological:  negative for headaches, dizziness, vertigo, memory problems and gait   Behavl/Psych:feelings of anxiety, depression, mood changes    Past Medical History:   Diagnosis Date  Anemia 2012    Essential hypertension     HX OTHER MEDICAL     2009 baby put up for adoption, tested + for cocaine with that pregnancy    Hypertension      Past Surgical History:   Procedure Laterality Date    HX CHOLECYSTECTOMY      HX GYN           Social History     Social History    Marital status: LEGALLY      Spouse name: N/A    Number of children: N/A    Years of education: N/A     Social History Main Topics    Smoking status: Current Every Day Smoker     Packs/day: 0.50    Smokeless tobacco: Never Used    Alcohol use Yes    Drug use: No    Sexual activity: Yes     Birth control/ protection: None     Other Topics Concern    None     Social History Narrative     Family History   Problem Relation Age of Onset    Hypertension Father      Current Outpatient Prescriptions   Medication Sig Dispense Refill    ALPRAZolam (XANAX) 0.5 mg tablet Take 1 Tab by mouth two (2) times a day. Max Daily Amount: 1 mg. 60 Tab 1    candesartan (ATACAND) 32 mg tablet Take 1 Tab by mouth daily. 30 Tab 3    chlorthalidone (HYGROTEN) 25 mg tablet Take 1 Tab by mouth daily. 30 Tab 3    amLODIPine (NORVASC) 5 mg tablet Take 1 Tab by mouth daily. 30 Tab 3    budesonide-formoterol (SYMBICORT) 160-4.5 mcg/actuation HFAA Take 2 Puffs by inhalation two (2) times a day. 1 Inhaler 4    albuterol (PROVENTIL HFA, VENTOLIN HFA, PROAIR HFA) 90 mcg/actuation inhaler Take 1 Puff by inhalation every six (6) hours as needed for Wheezing or Shortness of Breath (cough). 1 Inhaler 3    albuterol-ipratropium (DUO-NEB) 2.5 mg-0.5 mg/3 ml nebu 3 mL by Nebulization route every six (6) hours as needed.  50 mL 0     Allergies   Allergen Reactions    Hydralazine Unknown (comments)     jitteriness    Hydromorphone Itching     morphine    Morphine Itching    Sulfa (Sulfonamide Antibiotics) Nausea and Vomiting       Objective:  Visit Vitals    /90    Pulse 92    Temp 98.9 °F (37.2 °C)    Resp 16    Ht 5' 7\" (1.702 m)    Wt 176 lb (79.8 kg)    SpO2 96%    BMI 27.57 kg/m2     Physical Exam:   General appearance - alert, well appearing, and in no distress  Mental status - alert, oriented to person, place, and time  EYE-ROCKY, EOMI, corneas normal, no foreign bodies  ENT-ENT exam normal, no neck nodes or sinus tenderness  Nose - normal and patent, no erythema, discharge or polyps  Mouth - mucous membranes moist, pharynx normal without lesions  Neck - supple, no significant adenopathy   Chest - clear to auscultation, no wheezes, rales or rhonchi, symmetric air entry   Heart - normal rate, regular rhythm, normal S1, S2, no murmurs, rubs, clicks or gallops   Abdomen - soft, nontender, nondistended, no masses or organomegaly  Lymph- no adenopathy palpable  Ext-peripheral pulses normal, no pedal edema, no clubbing or cyanosis  Skin-Warm and dry. no hyperpigmentation, vitiligo, or suspicious lesions  Neuro -alert, oriented, normal speech, no focal findings or movement disorder noted  Neck-normal C-spine, no tenderness, full ROM without pain  Feet-no nail deformities or callus formation with good pulses noted      Results for orders placed or performed in visit on 11/14/17   AMB POC LIPID PROFILE   Result Value Ref Range    Cholesterol (POC) 122     Triglycerides (POC) 182     HDL Cholesterol (POC) 45     Non-HDL Goal (POC) 77     LDL Cholesterol (POC) 40 MG/DL    TChol/HDL Ratio (POC) 2.7        Assessment/Plan:    ICD-10-CM ICD-9-CM    1. Anxiety and depression F41.8 300.00      311    2. Essential hypertension I10 401.9      No orders of the defined types were placed in this encounter. lose weight, increase physical activity,Take 81mg aspirin daily  Patient Instructions   [employer] Activation    Thank you for requesting access to [employer]. Please follow the instructions below to securely access and download your online medical record.  [employer] allows you to send messages to your doctor, view your test results, renew your prescriptions, schedule appointments, and more. How Do I Sign Up? 1. In your internet browser, go to [email or web address]  2. Click on the First Time User? Click Here link in the Sign In box. You will be redirect to the New Member Sign Up page. 3. Enter your [employer] Access Code exactly as it appears below. You will not need to use this code after youve completed the sign-up process. If you do not sign up before the expiration date, you must request a new code. [employer] Access Code: [email or web address]  Expires: 2018  3:57 PM (This is the date your [employer] access code will )    4. Enter the last four digits of your Social Security Number (xxxx) and Date of Birth (mm/dd/yyyy) as indicated and click Submit. You will be taken to the next sign-up page. 5. Create a [employer] ID. This will be your [employer] login ID and cannot be changed, so think of one that is secure and easy to remember. 6. Create a [employer] password. You can change your password at any time. 7. Enter your Password Reset Question and Answer. This can be used at a later time if you forget your password. 8. Enter your e-mail address. You will receive e-mail notification when new information is available in [address] 9. Click Sign Up. You can now view and download portions of your medical record. 10. Click the Download Summary menu link to download a portable copy of your medical information. Additional Information    If you have questions, please visit the Frequently Asked Questions section of the [employer] website at [email or web address] Remember, [employer] is NOT to be used for urgent needs. For medical emergencies, dial 911. Follow-up Disposition:  Return in about 4 weeks (around 2018), or if symptoms worsen or fail to improve. I have reviewed with the patient details of the assessment and plan and all questions were answered. Relevent patient education was performed. The most recent lab findings were reviewed with the patient. She was told that she must follow up with her psy appointment. An After Visit Summary was printed and given to the patient.

## 2018-02-19 ENCOUNTER — OFFICE VISIT (OUTPATIENT)
Dept: INTERNAL MEDICINE CLINIC | Age: 48
End: 2018-02-19

## 2018-02-19 VITALS
TEMPERATURE: 97.9 F | OXYGEN SATURATION: 100 % | DIASTOLIC BLOOD PRESSURE: 70 MMHG | BODY MASS INDEX: 27.62 KG/M2 | HEART RATE: 98 BPM | SYSTOLIC BLOOD PRESSURE: 130 MMHG | RESPIRATION RATE: 19 BRPM | HEIGHT: 67 IN | WEIGHT: 176 LBS

## 2018-02-19 DIAGNOSIS — Z12.39 SCREENING FOR BREAST CANCER: ICD-10-CM

## 2018-02-19 DIAGNOSIS — J44.9 CHRONIC OBSTRUCTIVE PULMONARY DISEASE, UNSPECIFIED COPD TYPE (HCC): ICD-10-CM

## 2018-02-19 DIAGNOSIS — I10 BENIGN ESSENTIAL HTN: ICD-10-CM

## 2018-02-19 DIAGNOSIS — F41.9 ANXIETY AND DEPRESSION: Primary | ICD-10-CM

## 2018-02-19 DIAGNOSIS — F32.A ANXIETY AND DEPRESSION: Primary | ICD-10-CM

## 2018-02-19 RX ORDER — ALPRAZOLAM 0.5 MG/1
0.5 TABLET ORAL 2 TIMES DAILY
Qty: 60 TAB | Refills: 0 | Status: SHIPPED | OUTPATIENT
Start: 2018-02-19 | End: 2018-03-27 | Stop reason: SDUPTHER

## 2018-02-19 RX ORDER — CANDESARTAN 32 MG/1
32 TABLET ORAL DAILY
Qty: 30 TAB | Refills: 3 | Status: SHIPPED | OUTPATIENT
Start: 2018-02-19 | End: 2018-05-17 | Stop reason: SDUPTHER

## 2018-02-19 RX ORDER — CHLORTHALIDONE 25 MG/1
25 TABLET ORAL DAILY
Qty: 30 TAB | Refills: 3 | Status: SHIPPED | OUTPATIENT
Start: 2018-02-19 | End: 2018-05-17 | Stop reason: SDUPTHER

## 2018-02-19 RX ORDER — ALBUTEROL SULFATE 90 UG/1
1 AEROSOL, METERED RESPIRATORY (INHALATION)
Qty: 1 INHALER | Refills: 3 | Status: SHIPPED | OUTPATIENT
Start: 2018-02-19 | End: 2018-12-17 | Stop reason: ALTCHOICE

## 2018-02-19 RX ORDER — BUDESONIDE AND FORMOTEROL FUMARATE DIHYDRATE 160; 4.5 UG/1; UG/1
2 AEROSOL RESPIRATORY (INHALATION) 2 TIMES DAILY
Qty: 1 INHALER | Refills: 4 | Status: SHIPPED | OUTPATIENT
Start: 2018-02-19 | End: 2018-11-19 | Stop reason: SDUPTHER

## 2018-02-19 NOTE — MR AVS SNAPSHOT
[address] [facility] [address] 
[phone] Patient: Kamari Mills MRN:  [id number] Visit Information Date & Time Provider Department Dept. Phone Encounter #  
 2/19/2018  3:15 PM Tamara Gomez MD [facility] [phone] [id number] Follow-up Instructions Return in about 4 weeks (around 3/19/2018), or if symptoms worsen or fail to improve. Upcoming Health Maintenance Date Due  
 PAP AKA CERVICAL CYTOLOGY 4/4/1993 DTaP/Tdap/Td series (2 - Td) 8/11/2022 Allergies as of 2/19/2018  Review Complete On: 2/19/2018 By: Tamara Gomez MD  
  
 Severity Noted Reaction Type Reactions Hydralazine  02/23/2012   Intolerance Unknown (comments)  
 jitteriness Hydromorphone  06/01/2011    Itching  
 morphine Morphine  02/14/2012    Itching Sulfa (Sulfonamide Antibiotics)  06/01/2011    Nausea and Vomiting Current Immunizations  Reviewed on 2/16/2012 Name Date Influenza Vaccine Split  Deferred (Patient Refused) RHOGAM INJECTION 2/17/2012  8:45 PM  
 TDAP Vaccine 8/11/2012  7:15 AM  
  
 Not reviewed this visit You Were Diagnosed With   
  
 Codes Comments Anxiety and depression    -  Primary ICD-10-CM: F41.8 ICD-9-CM: 300.00, 311 Chronic obstructive pulmonary disease, unspecified COPD type ([facility]     ICD-10-CM: J44.9 ICD-9-CM: 542 Benign essential HTN     ICD-10-CM: I10 
ICD-9-CM: 401.1 Screening for breast cancer     ICD-10-CM: Z12.31 
ICD-9-CM: V76.10 Vitals BP Pulse Temp Resp Height(growth percentile) Weight(growth percentile) 130/70 (BP 1 Location: Right arm, BP Patient Position: Sitting) 98 97.9 °F (36.6 °C) (Oral) 19 5' 7\" (1.702 m) 176 lb (79.8 kg) LMP SpO2 BMI OB Status Smoking Status 02/19/2018 100% 27.57 kg/m2 Having regular periods Current Every Day Smoker BMI and BSA Data Body Mass Index Body Surface Area  
 27.57 kg/m 2 1.94 m 2 Preferred Pharmacy Pharmacy Name Phone [facility] [address], [address] [place] [address] [phone] Your Updated Medication List  
  
   
This list is accurate as of: 2/19/18  4:00 PM.  Always use your most recent med list.  
  
  
  
  
 albuterol 90 mcg/actuation inhaler Commonly known as:  PROVENTIL HFA, VENTOLIN HFA, PROAIR HFA Take 1 Puff by inhalation every six (6) hours as needed for Wheezing or Shortness of Breath (cough). albuterol-ipratropium 2.5 mg-0.5 mg/3 ml Nebu Commonly known as:  DUO-NEB  
3 mL by Nebulization route every six (6) hours as needed. ALPRAZolam 0.5 mg tablet Commonly known as:  Velia Grandchild Take 1 Tab by mouth two (2) times a day. Max Daily Amount: 1 mg. amLODIPine 5 mg tablet Commonly known as:  Lynnell Manual Take 1 Tab by mouth daily. budesonide-formoterol 160-4.5 mcg/actuation Hfaa Commonly known as:  SYMBICORT Take 2 Puffs by inhalation two (2) times a day. candesartan 32 mg tablet Commonly known as:  ATACAND Take 1 Tab by mouth daily. chlorthalidone 25 mg tablet Commonly known as:  Sigurd Cipro Take 1 Tab by mouth daily. Prescriptions Printed Refills ALPRAZolam (XANAX) 0.5 mg tablet 0 Sig: Take 1 Tab by mouth two (2) times a day. Max Daily Amount: 1 mg. Class: Print Route: Oral  
  
Prescriptions Sent to Pharmacy Refills  
 albuterol (PROVENTIL HFA, VENTOLIN HFA, PROAIR HFA) 90 mcg/actuation inhaler 3 Sig: Take 1 Puff by inhalation every six (6) hours as needed for Wheezing or Shortness of Breath (cough). Class: Normal  
 Pharmacy: [facility] [address], [address] [place] [address] Ph #: [phone] Route: Inhalation  
 budesonide-formoterol (SYMBICORT) 160-4.5 mcg/actuation HFAA 4 Sig: Take 2 Puffs by inhalation two (2) times a day.   
 Class: Normal  
 Pharmacy: [facility]  [address] [place] [address], [address] [place] [address] Ph #: [phone] Route: Inhalation  
 chlorthalidone (HYGROTEN) 25 mg tablet 3 Sig: Take 1 Tab by mouth daily. Class: Normal  
 Pharmacy: [facility] [address] [place] [address] Ph #: [phone] Route: Oral  
 candesartan (ATACAND) 32 mg tablet 3 Sig: Take 1 Tab by mouth daily. Class: Normal  
 Pharmacy: [facility]  [address] [place] [address], [address] [place] [address] Ph #: [phone] Route: Oral  
  
Follow-up Instructions Return in about 4 weeks (around 3/19/2018), or if symptoms worsen or fail to improve. To-Do List   
 2018 Imaging:  TI MAMMO BI SCREENING INCL CAD Patient Instructions MyChart Activation Thank you for requesting access to [employer]. Please follow the instructions below to securely access and download your online medical record. [employer] allows you to send messages to your doctor, view your test results, renew your prescriptions, schedule appointments, and more. How Do I Sign Up? 1. In your internet browser, go to [email or web address] 
2. Click on the First Time User? Click Here link in the Sign In box. You will be redirect to the New Member Sign Up page. 3. Enter your [employer] Access Code exactly as it appears below. You will not need to use this code after youve completed the sign-up process. If you do not sign up before the expiration date, you must request a new code. [employer] Access Code: Activation code not generated Current [employer] Status: Patient Declined (This is the date your [employer] access code will ) 4. Enter the last four digits of your Social Security Number (xxxx) and Date of Birth (mm/dd/yyyy) as indicated and click Submit. You will be taken to the next sign-up page. 5. Create a [employer] ID. This will be your [employer] login ID and cannot be changed, so think of one that is secure and easy to remember. 6. Create a [employer] password. You can change your password at any time. 7. Enter your Password Reset Question and Answer. This can be used at a later time if you forget your password. 8. Enter your e-mail address. You will receive e-mail notification when new information is available in [address] 9. Click Sign Up. You can now view and download portions of your medical record. 10. Click the Download Summary menu link to download a portable copy of your medical information. Additional Information If you have questions, please visit the Frequently Asked Questions section of the [employer] website at [email or web address] Remember, [employer] is NOT to be used for urgent needs. For medical emergencies, dial 911. Please provide this summary of care documentation to your next provider. Your primary care clinician is listed as Hope Lea. If you have any questions after today's visit, please call [phone].

## 2018-02-19 NOTE — PROGRESS NOTES
Darrell Hernandez is a 41 y.o. female and presents with Anxiety and Hypertension  . Subjective:  Depression Review:  Patient is seen for followup of depression. Ongoing depressed mood, hypersomnia, psychomotor agitation, psychomotor retardation, feelings of worthlessness/guilt, difficulty concentrating and hopelessness she has improved some with her Xanax.,she has no crying spells . Treatment includes Xanax and no other therapies. She states she not been able  To get a psy appointment after multiple attempts in calling [facility]. She denies recurrent thoughts of death and suicidal thoughts without plan. She experiences the following side effects from the treatment: none. Hypertension Review:  The patient has hypertension . Diet and Lifestyle: generally follows a low sodium diet, exercises sporadically  Home BP Monitoring: is not measured at home. Pertinent ROS: taking medications as instructed, no medication side effects noted, no TIA's, no chest pain on exertion, no dyspnea on exertion, no swelling of ankles. COPD REVIEW:  The patient is being seen for follow up of COPD,the patient has been stable  Oxygen: She currently is not on home oxygen therapy. Symptoms: chronic dyspnea: severity = not present: course of sx: stable. Patient uses 2 pillows at night. Patient does continue to smoke cigarettes. Health maintenance suggests the needs for a mammogram      Review of Systems  Constitutional: negative for fevers, chills, anorexia and weight loss  Eyes:   negative for visual disturbance and irritation  ENT:   negative for tinnitus,sore throat,nasal congestion,ear pains. hoarseness  Respiratory:  negative for cough, hemoptysis, dyspnea,wheezing  CV:   negative for chest pain, palpitations, lower extremity edema  GI:   negative for nausea, vomiting, diarrhea, abdominal pain,melena  Endo:               negative for polyuria,polydipsia,polyphagia,heat intolerance  Genitourinary: negative for frequency, dysuria and hematuria  Integument:  negative for rash and pruritus  Hematologic:  negative for easy bruising and gum/nose bleeding  Musculoskel: negative for myalgias, arthralgias, back pain, muscle weakness, joint pain  Neurological:  negative for headaches, dizziness, vertigo, memory problems and gait   Behavl/Psych:feelings of anxiety, depression, mood changes    Past Medical History:   Diagnosis Date    Anemia 2012    Essential hypertension     HX OTHER MEDICAL     2009 baby put up for adoption, tested + for cocaine with that pregnancy    Hypertension      Past Surgical History:   Procedure Laterality Date    HX CHOLECYSTECTOMY      HX GYN           Social History     Social History    Marital status: LEGALLY      Spouse name: N/A    Number of children: N/A    Years of education: N/A     Social History Main Topics    Smoking status: Current Every Day Smoker     Packs/day: 0.50    Smokeless tobacco: Never Used    Alcohol use Yes    Drug use: No    Sexual activity: Yes     Birth control/ protection: None     Other Topics Concern    None     Social History Narrative     Family History   Problem Relation Age of Onset    Hypertension Father      Current Outpatient Prescriptions   Medication Sig Dispense Refill    albuterol (PROVENTIL HFA, VENTOLIN HFA, PROAIR HFA) 90 mcg/actuation inhaler Take 1 Puff by inhalation every six (6) hours as needed for Wheezing or Shortness of Breath (cough). 1 Inhaler 3    budesonide-formoterol (SYMBICORT) 160-4.5 mcg/actuation HFAA Take 2 Puffs by inhalation two (2) times a day. 1 Inhaler 4    chlorthalidone (HYGROTEN) 25 mg tablet Take 1 Tab by mouth daily. 30 Tab 3    candesartan (ATACAND) 32 mg tablet Take 1 Tab by mouth daily. 30 Tab 3    ALPRAZolam (XANAX) 0.5 mg tablet Take 1 Tab by mouth two (2) times a day. Max Daily Amount: 1 mg. 60 Tab 0    amLODIPine (NORVASC) 5 mg tablet Take 1 Tab by mouth daily.  30 Tab 3    albuterol-ipratropium (DUO-NEB) 2.5 mg-0.5 mg/3 ml nebu 3 mL by Nebulization route every six (6) hours as needed. 50 mL 0     Allergies   Allergen Reactions    Hydralazine Unknown (comments)     jitteriness    Hydromorphone Itching     morphine    Morphine Itching    Sulfa (Sulfonamide Antibiotics) Nausea and Vomiting       Objective:  Visit Vitals    /70 (BP 1 Location: Right arm, BP Patient Position: Sitting)    Pulse 98    Temp 97.9 °F (36.6 °C) (Oral)    Resp 19    Ht 5' 7\" (1.702 m)    Wt 176 lb (79.8 kg)    LMP 02/19/2018    SpO2 100%    BMI 27.57 kg/m2     Physical Exam:   General appearance - alert, well appearing, and in no distress  Mental status - alert, oriented to person, place, and time  EYE-ROCKY, EOMI, corneas normal, no foreign bodies  ENT-ENT exam normal, no neck nodes or sinus tenderness  Nose - normal and patent, no erythema, discharge or polyps  Mouth - mucous membranes moist, pharynx normal without lesions  Neck - supple, no significant adenopathy   Chest - clear to auscultation, no wheezes, rales or rhonchi, symmetric air entry   Heart - normal rate, regular rhythm, normal S1, S2, no murmurs, rubs, clicks or gallops   Abdomen - soft, nontender, nondistended, no masses or organomegaly  Lymph- no adenopathy palpable  Ext-peripheral pulses normal, no pedal edema, no clubbing or cyanosis  Skin-Warm and dry.  no hyperpigmentation, vitiligo, or suspicious lesions  Neuro -alert, oriented, normal speech, no focal findings or movement disorder noted  Neck-normal C-spine, no tenderness, full ROM without pain  Feet-no nail deformities or callus formation with good pulses noted      Results for orders placed or performed in visit on 11/14/17   AMB POC LIPID PROFILE   Result Value Ref Range    Cholesterol (POC) 122     Triglycerides (POC) 182     HDL Cholesterol (POC) 45     Non-HDL Goal (POC) 77     LDL Cholesterol (POC) 40 MG/DL    TChol/HDL Ratio (POC) 2.7        Assessment/Plan: ICD-10-CM ICD-9-CM    1. Anxiety and depression F41.8 300.00 ALPRAZolam (XANAX) 0.5 mg tablet     311    2. Chronic obstructive pulmonary disease, unspecified COPD type (HCC) J44.9 496 albuterol (PROVENTIL HFA, VENTOLIN HFA, PROAIR HFA) 90 mcg/actuation inhaler   3. Benign essential HTN I10 401.1 budesonide-formoterol (SYMBICORT) 160-4.5 mcg/actuation HFAA      chlorthalidone (HYGROTEN) 25 mg tablet      candesartan (ATACAND) 32 mg tablet   4. Screening for breast cancer Z12.31 V76.10 [facility] [place] MAMMO BI SCREENING INCL CAD     Orders Placed This Encounter    TI MAMMO BI SCREENING INCL CAD     Standing Status:   Future     Standing Expiration Date:   8/21/2018     Order Specific Question:   Reason for Exam     Answer:   breast cancer screening     Order Specific Question:   Is Patient Pregnant? Answer:   No    albuterol (PROVENTIL HFA, VENTOLIN HFA, PROAIR HFA) 90 mcg/actuation inhaler     Sig: Take 1 Puff by inhalation every six (6) hours as needed for Wheezing or Shortness of Breath (cough). Dispense:  1 Inhaler     Refill:  3    budesonide-formoterol (SYMBICORT) 160-4.5 mcg/actuation HFAA     Sig: Take 2 Puffs by inhalation two (2) times a day. Dispense:  1 Inhaler     Refill:  4    chlorthalidone (HYGROTEN) 25 mg tablet     Sig: Take 1 Tab by mouth daily. Dispense:  30 Tab     Refill:  3    candesartan (ATACAND) 32 mg tablet     Sig: Take 1 Tab by mouth daily. Dispense:  30 Tab     Refill:  3    ALPRAZolam (XANAX) 0.5 mg tablet     Sig: Take 1 Tab by mouth two (2) times a day. Max Daily Amount: 1 mg. Dispense:  60 Tab     Refill:  0     lose weight, increase physical activity,Take 81mg aspirin daily  Patient Instructions   [employer] Activation    Thank you for requesting access to [employer]. Please follow the instructions below to securely access and download your online medical record.  [employer] allows you to send messages to your doctor, view your test results, renew your prescriptions, schedule appointments, and more. How Do I Sign Up? 1. In your internet browser, go to [email or web address]  2. Click on the First Time User? Click Here link in the Sign In box. You will be redirect to the New Member Sign Up page. 3. Enter your MedTest DX Access Code exactly as it appears below. You will not need to use this code after youve completed the sign-up process. If you do not sign up before the expiration date, you must request a new code. "[employer]t Access Code: Activation code not generated  Current MedTest DX Status: Patient Declined (This is the date your "[employer]t access code will )    4. Enter the last four digits of your Social Security Number (xxxx) and Date of Birth (mm/dd/yyyy) as indicated and click Submit. You will be taken to the next sign-up page. 5. Create a "[employer]t ID. This will be your MedTest DX login ID and cannot be changed, so think of one that is secure and easy to remember. 6. Create a MedTest DX password. You can change your password at any time. 7. Enter your Password Reset Question and Answer. This can be used at a later time if you forget your password. 8. Enter your e-mail address. You will receive e-mail notification when new information is available in [address] 9. Click Sign Up. You can now view and download portions of your medical record. 10. Click the Download Summary menu link to download a portable copy of your medical information. Additional Information    If you have questions, please visit the Frequently Asked Questions section of the MedTest DX website at [email or web address] Remember, MedTest DX is NOT to be used for urgent needs. For medical emergencies, dial 911. Follow-up Disposition:  Return in about 4 weeks (around 3/19/2018), or if symptoms worsen or fail to improve. I have reviewed with the patient details of the assessment and plan and all questions were answered. Relevent patient education was performed. The most recent lab findings were reviewed with the patient. She was told that she must follow up with her psy appointment. An After Visit Summary was printed and given to the patient.

## 2018-02-19 NOTE — PATIENT INSTRUCTIONS
[employer] Activation    Thank you for requesting access to [employer]. Please follow the instructions below to securely access and download your online medical record. [employer] allows you to send messages to your doctor, view your test results, renew your prescriptions, schedule appointments, and more. How Do I Sign Up? 1. In your internet browser, go to [email or web address]  2. Click on the First Time User? Click Here link in the Sign In box. You will be redirect to the New Member Sign Up page. 3. Enter your [employer] Access Code exactly as it appears below. You will not need to use this code after youve completed the sign-up process. If you do not sign up before the expiration date, you must request a new code. [employer] Access Code: Activation code not generated  Current KuGou Status: Patient Declined (This is the date your [employer] access code will )    4. Enter the last four digits of your Social Security Number (xxxx) and Date of Birth (mm/dd/yyyy) as indicated and click Submit. You will be taken to the next sign-up page. 5. Create a [facility]ou ID. This will be your [facility]ou login ID and cannot be changed, so think of one that is secure and easy to remember. 6. Create a [employer]ou password. You can change your password at any time. 7. Enter your Password Reset Question and Answer. This can be used at a later time if you forget your password. 8. Enter your e-mail address. You will receive e-mail notification when new information is available in [address] 9. Click Sign Up. You can now view and download portions of your medical record. 10. Click the Download Summary menu link to download a portable copy of your medical information. Additional Information    If you have questions, please visit the Frequently Asked Questions section of the [employer] website at [email or web address] Remember, [employer] is NOT to be used for urgent needs. For medical emergencies, dial 911.

## 2018-03-27 ENCOUNTER — OFFICE VISIT (OUTPATIENT)
Dept: INTERNAL MEDICINE CLINIC | Age: 48
End: 2018-03-27

## 2018-03-27 VITALS
DIASTOLIC BLOOD PRESSURE: 80 MMHG | BODY MASS INDEX: 27.62 KG/M2 | RESPIRATION RATE: 19 BRPM | HEART RATE: 88 BPM | HEIGHT: 67 IN | SYSTOLIC BLOOD PRESSURE: 120 MMHG | WEIGHT: 176 LBS | TEMPERATURE: 98.1 F | OXYGEN SATURATION: 98 %

## 2018-03-27 DIAGNOSIS — J44.9 CHRONIC OBSTRUCTIVE PULMONARY DISEASE, UNSPECIFIED COPD TYPE (HCC): ICD-10-CM

## 2018-03-27 DIAGNOSIS — I10 ESSENTIAL HYPERTENSION: Primary | ICD-10-CM

## 2018-03-27 DIAGNOSIS — F41.9 ANXIETY AND DEPRESSION: ICD-10-CM

## 2018-03-27 DIAGNOSIS — F32.A ANXIETY AND DEPRESSION: ICD-10-CM

## 2018-03-27 LAB
CHOLEST SERPL-MCNC: 142 MG/DL
CREATININE, EXTERNAL: 1.42
HDLC SERPL-MCNC: 51 MG/DL
LDL CHOLESTEROL POC: 64 MG/DL
NON-HDL GOAL (POC): 91
TCHOL/HDL RATIO (POC): 2.8
TRIGL SERPL-MCNC: 135 MG/DL

## 2018-03-27 RX ORDER — ALPRAZOLAM 0.5 MG/1
0.5 TABLET ORAL 2 TIMES DAILY
Qty: 60 TAB | Refills: 0 | Status: SHIPPED | OUTPATIENT
Start: 2018-03-27 | End: 2018-11-19 | Stop reason: SDUPTHER

## 2018-03-27 NOTE — PATIENT INSTRUCTIONS
[employer] Activation    Thank you for requesting access to [employer]. Please follow the instructions below to securely access and download your online medical record. [employer] allows you to send messages to your doctor, view your test results, renew your prescriptions, schedule appointments, and more. How Do I Sign Up? 1. In your internet browser, go to [email or web address]  2. Click on the First Time User? Click Here link in the Sign In box. You will be redirect to the New Member Sign Up page. 3. Enter your Qriously Access Code exactly as it appears below. You will not need to use this code after youve completed the sign-up process. If you do not sign up before the expiration date, you must request a new code. [employer]ously Access Code: Activation code not generated  Current Q[employer]ously Status: Patient Declined (This is the date your [employer]riously access code will )    4. Enter the last four digits of your Social Security Number (xxxx) and Date of Birth (mm/dd/yyyy) as indicated and click Submit. You will be taken to the next sign-up page. 5. Create a Qriously ID. This will be your Qriously login ID and cannot be changed, so think of one that is secure and easy to remember. 6. Create a Qriously password. You can change your password at any time. 7. Enter your Password Reset Question and Answer. This can be used at a later time if you forget your password. 8. Enter your e-mail address. You will receive e-mail notification when new information is available in [address] 9. Click Sign Up. You can now view and download portions of your medical record. 10. Click the Download Summary menu link to download a portable copy of your medical information. Additional Information    If you have questions, please visit the Frequently Asked Questions section of the [employer]ously website at [email or web address] Remember, Qriously is NOT to be used for urgent needs. For medical emergencies, dial 911.

## 2018-03-27 NOTE — PROGRESS NOTES
Venice Cummins is a 41 y.o. female and presents with Depression and Hypertension  . Subjective:  Depression Review: an overdose. Patient is seen for followup of depression. Ongoing depressed mood, hypersomnia, psychomotor agitation, psychomotor retardation, feelings of worthlessness/guilt, difficulty concentrating and hopelessness she has improved some with her Xanax.,she has no crying spells . she states her coworker recently passed of Treatment includes Xanax and no other therapies. She states she  been able  To get a psy appointment at [address] [place] [address]. She denies recurrent thoughts of death and suicidal thoughts without plan. She experiences the following side effects from the treatment: none. Hypertension Review:  The patient has hypertension . Diet and Lifestyle: generally follows a low sodium diet, exercises sporadically  Home BP Monitoring: is not measured at home. Pertinent ROS: taking medications as instructed, no medication side effects noted, no TIA's, no chest pain on exertion, no dyspnea on exertion, no swelling of ankles. COPD REVIEW:  The patient is being seen for follow up of COPD,the patient has been stable  Oxygen: She currently is not on home oxygen therapy. Symptoms: chronic dyspnea: severity = not present: course of sx: stable. Patient uses 2 pillows at night. Patient does continue to smoke cigarettes. Review of Systems  Constitutional: negative for fevers, chills, anorexia and weight loss  Eyes:   negative for visual disturbance and irritation  ENT:   negative for tinnitus,sore throat,nasal congestion,ear pains. hoarseness  Respiratory:  negative for cough, hemoptysis, dyspnea,wheezing  CV:   negative for chest pain, palpitations, lower extremity edema  GI:   negative for nausea, vomiting, diarrhea, abdominal pain,melena  Endo:               negative for polyuria,polydipsia,polyphagia,heat intolerance  Genitourinary: negative for frequency, dysuria and hematuria  Integument: negative for rash and pruritus  Hematologic:  negative for easy bruising and gum/nose bleeding  Musculoskel: negative for myalgias, arthralgias, back pain, muscle weakness, joint pain  Neurological:  negative for headaches, dizziness, vertigo, memory problems and gait   Behavl/Psych:feelings of anxiety, depression, mood changes    Past Medical History:   Diagnosis Date    Anemia 2012    Essential hypertension     HX OTHER MEDICAL     2009 baby put up for adoption, tested + for cocaine with that pregnancy    Hypertension      Past Surgical History:   Procedure Laterality Date    HX CHOLECYSTECTOMY      HX GYN           Social History     Social History    Marital status: LEGALLY      Spouse name: N/A    Number of children: N/A    Years of education: N/A     Social History Main Topics    Smoking status: Current Every Day Smoker     Packs/day: 0.50    Smokeless tobacco: Never Used    Alcohol use Yes    Drug use: No    Sexual activity: Yes     Birth control/ protection: None     Other Topics Concern    None     Social History Narrative     Family History   Problem Relation Age of Onset    Hypertension Father      Current Outpatient Prescriptions   Medication Sig Dispense Refill    ALPRAZolam (XANAX) 0.5 mg tablet Take 1 Tab by mouth two (2) times a day. Max Daily Amount: 1 mg. 60 Tab 0    albuterol (PROVENTIL HFA, VENTOLIN HFA, PROAIR HFA) 90 mcg/actuation inhaler Take 1 Puff by inhalation every six (6) hours as needed for Wheezing or Shortness of Breath (cough). 1 Inhaler 3    budesonide-formoterol (SYMBICORT) 160-4.5 mcg/actuation HFAA Take 2 Puffs by inhalation two (2) times a day. 1 Inhaler 4    chlorthalidone (HYGROTEN) 25 mg tablet Take 1 Tab by mouth daily. 30 Tab 3    candesartan (ATACAND) 32 mg tablet Take 1 Tab by mouth daily. 30 Tab 3    amLODIPine (NORVASC) 5 mg tablet Take 1 Tab by mouth daily.  30 Tab 3    albuterol-ipratropium (DUO-NEB) 2.5 mg-0.5 mg/3 ml nebu 3 mL by Nebulization route every six (6) hours as needed. 50 mL 0     Allergies   Allergen Reactions    Hydralazine Unknown (comments)     jitteriness    Hydromorphone Itching     morphine    Morphine Itching    Sulfa (Sulfonamide Antibiotics) Nausea and Vomiting       Objective:  Visit Vitals    /80 (BP 1 Location: Right arm, BP Patient Position: Sitting)    Pulse 88    Temp 98.1 °F (36.7 °C) (Oral)    Resp 19    Ht 5' 7\" (1.702 m)    Wt 176 lb (79.8 kg)    LMP 03/23/2018    SpO2 98%    BMI 27.57 kg/m2     Physical Exam:   General appearance - alert, well appearing, and in no distress  Mental status - alert, oriented to person, place, and time  EYE-ROCKY, EOMI, corneas normal, no foreign bodies  ENT-ENT exam normal, no neck nodes or sinus tenderness  Nose - normal and patent, no erythema, discharge or polyps  Mouth - mucous membranes moist, pharynx normal without lesions  Neck - supple, no significant adenopathy   Chest - clear to auscultation, no wheezes, rales or rhonchi, symmetric air entry   Heart - normal rate, regular rhythm, normal S1, S2, no murmurs, rubs, clicks or gallops   Abdomen - soft, nontender, nondistended, no masses or organomegaly  Lymph- no adenopathy palpable  Ext-peripheral pulses normal, no pedal edema, no clubbing or cyanosis  Skin-Warm and dry. no hyperpigmentation, vitiligo, or suspicious lesions  Neuro -alert, oriented, normal speech, no focal findings or movement disorder noted  Neck-normal C-spine, no tenderness, full ROM without pain  Feet-no nail deformities or callus formation with good pulses noted      Results for orders placed or performed in visit on 11/14/17   AMB POC LIPID PROFILE   Result Value Ref Range    Cholesterol (POC) 122     Triglycerides (POC) 182     HDL Cholesterol (POC) 45     Non-HDL Goal (POC) 77     LDL Cholesterol (POC) 40 MG/DL    TChol/HDL Ratio (POC) 2.7        Assessment/Plan:    ICD-10-CM ICD-9-CM    1.  Essential hypertension I10 401.9 AMB POC LIPID PROFILE      CBC W/O DIFF      METABOLIC PANEL, COMPREHENSIVE   2. Anxiety and depression F41.8 300.00 ALPRAZolam (XANAX) 0.5 mg tablet     311    3. Chronic [facility] pulmonary disease, [facility] J44.9 496      Orders Placed This Encounter    CBC W/O DIFF    METABOLIC PANEL, COMPREHENSIVE    AMB POC LIPID PROFILE    ALPRAZolam (XANAX) 0.5 mg tablet     Sig: Take 1 Tab by mouth two (2) times a day. Max Daily Amount: 1 mg. Dispense:  60 Tab     Refill:  0     lose weight, increase physical activity,Take 81mg aspirin daily  Patient Instructions   MyChart Activation    Thank you for requesting access to [employer] Management software. Please follow the instructions below to securely access and download your online medical record. [employer] Document Management software allows you to send messages to your doctor, view your test results, renew your prescriptions, schedule appointments, and more. How Do I Sign Up? 1. In your internet browser, go to [email or web address]  2. Click on the First Time User? Click Here link in the Sign In box. You will be redirect to the New Member Sign Up page. 3. Enter your [employer] Document Management software Access Code exactly as it appears below. You will not need to use this code after youve completed the sign-up process. If you do not sign up before the expiration date, you must request a new code. [employer] Document Management software Access Code: Activation code not generated  Current FileHold Document Management software Status: Patient Declined (This is the date your TalentSoftt access code will )    4. Enter the last four digits of your Social Security Number (xxxx) and Date of Birth (mm/dd/yyyy) as indicated and click Submit. You will be taken to the next sign-up page. 5. Create a [employer] Document Management software ID. This will be your [employer] Management software login ID and cannot be changed, so think of one that is secure and easy to remember. 6. Create a [employer] Document Management software password. You can change your password at any time. 7. Enter your Password Reset Question and Answer.  This can be used at a later time if you forget your password. 8. Enter your e-mail address. You will receive e-mail notification when new information is available in [address] 9. Click Sign Up. You can now view and download portions of your medical record. 10. Click the Download Summary menu link to download a portable copy of your medical information. Additional Information    If you have questions, please visit the Frequently Asked Questions section of the [employer] website at [email or web address] Remember, [employer] is NOT to be used for urgent needs. For medical emergencies, dial 911. Follow-up Disposition:  Return in about 3 months (around 6/27/2018), or if symptoms worsen or fail to improve. I have reviewed with the patient details of the assessment and plan and all questions were answered. Relevent patient education was performed. The most recent lab findings were reviewed with the patient. An After Visit Summary was printed and given to the patient.

## 2018-03-27 NOTE — PROGRESS NOTES
1. Have you been to the ER, urgent care clinic since your last visit? Hospitalized since your last visit? No    2. Have you seen or consulted any other health care providers outside of the [address] [place] [address] since your last visit? Include any pap smears or colon screening.  No

## 2018-03-27 NOTE — MR AVS SNAPSHOT
[address] [facility] 7 [address] 
[phone] Patient: Ollie Magdaleno MRN:  [id number] Visit Information Date & Time Provider Department Dept. Phone Encounter #  
 3/27/2018  3:00 PM Araceli Sanders MD [address] [facility] [phone] [id number] Follow-up Instructions Return in about 3 months (around 6/27/2018), or if symptoms worsen or fail to improve. Upcoming Health Maintenance Date Due  
 PAP AKA CERVICAL CYTOLOGY 4/4/1993 DTaP/Tdap/Td series (2 - Td) 8/11/2022 Allergies as of 3/27/2018  Review Complete On: 3/27/2018 By: Marlin Sparrow LPN Severity Noted Reaction Type Reactions Hydralazine  02/23/2012   Intolerance Unknown (comments)  
 jitteriness Hydromorphone  06/01/2011    Itching  
 morphine Morphine  02/14/2012    Itching Sulfa (Sulfonamide Antibiotics)  06/01/2011    Nausea and Vomiting Current Immunizations  Reviewed on 2/16/2012 Name Date Influenza Vaccine Split  Deferred (Patient Refused) RHOGAM INJECTION 2/17/2012  8:45 PM  
 TDAP Vaccine 8/11/2012  7:15 AM  
  
 Not reviewed this visit You Were Diagnosed With   
  
 Codes Comments Essential hypertension    -  Primary ICD-10-CM: I10 
ICD-9-CM: 401.9 Anxiety and depression     ICD-10-CM: F41.8 ICD-9-CM: 300.00, 311 Chronic obstructive pulmonary disease, unspecified COPD type ([facility].)     ICD-10-CM: J44.9 ICD-9-CM: 938 Vitals BP Pulse Temp Resp Height(growth percentile) Weight(growth percentile) 120/80 (BP 1 Location: Right arm, BP Patient Position: Sitting) 88 98.1 °F (36.7 °C) (Oral) 19 5' 7\" (1.702 m) 176 lb (79.8 kg) LMP SpO2 BMI OB Status Smoking Status 03/23/2018 98% 27.57 kg/m2 Having regular periods Current Every Day Smoker BMI and BSA Data Body Mass Index Body Surface Area  
 27.57 kg/m 2 1.94 m 2 Preferred Pharmacy Pharmacy Name Phone [facility] [address], [address] [place] [address] [phone] Your Updated Medication List  
  
   
This list is accurate as of 3/27/18  4:04 PM.  Always use your most recent med list.  
  
  
  
  
 albuterol 90 mcg/actuation inhaler Commonly known as:  PROVENTIL HFA, VENTOLIN HFA, PROAIR HFA Take 1 Puff by inhalation every six (6) hours as needed for Wheezing or Shortness of Breath (cough). albuterol-ipratropium 2.5 mg-0.5 mg/3 ml Nebu Commonly known as:  DUO-NEB  
3 mL by Nebulization route every six (6) hours as needed. ALPRAZolam 0.5 mg tablet Commonly known as:  Kartik Dunker Take 1 Tab by mouth two (2) times a day. Max Daily Amount: 1 mg. amLODIPine 5 mg tablet Commonly known as:  Raegan Ren Take 1 Tab by mouth daily. budesonide-formoterol 160-4.5 mcg/actuation Hfaa Commonly known as:  SYMBICORT Take 2 Puffs by inhalation two (2) times a day. candesartan 32 mg tablet Commonly known as:  ATACAND Take 1 Tab by mouth daily. chlorthalidone 25 mg tablet Commonly known as:  Birdie Gabrielle Take 1 Tab by mouth daily. Prescriptions Printed Refills ALPRAZolam (XANAX) 0.5 mg tablet 0 Sig: Take 1 Tab by mouth two (2) times a day. Max Daily Amount: 1 mg. Class: Print Route: Oral  
  
We Performed the Following AMB POC LIPID PROFILE [04751 CPT(R)] CBC W/O DIFF [95447 CPT(R)] METABOLIC PANEL, COMPREHENSIVE [01616 CPT(R)] Follow-up Instructions Return in about 3 months (around 6/27/2018), or if symptoms worsen or fail to improve. Patient Instructions DDRdrive Activation Thank you for requesting access to DDRdrive. Please follow the instructions below to securely access and download your online medical record. DDRdrive allows you to send messages to your doctor, view your test results, renew your prescriptions, schedule appointments, and more. How Do I Sign Up? 1. In your internet browser, go to [email or web address] 
2. Click on the First Time User? Click Here link in the Sign In box. You will be redirect to the New Member Sign Up page. 3. Enter your BubbleNoise Access Code exactly as it appears below. You will not need to use this code after youve completed the sign-up process. If you do not sign up before the expiration date, you must request a new code. MyChart Access Code: Activation code not generated Current BubbleNoise Status: Patient Declined (This is the date your MyChart access code will ) 4. Enter the last four digits of your Social Security Number (xxxx) and Date of Birth (mm/dd/yyyy) as indicated and click Submit. You will be taken to the next sign-up page. 5. Create a HaveMyShiftt ID. This will be your BubbleNoise login ID and cannot be changed, so think of one that is secure and easy to remember. 6. Create a BubbleNoise password. You can change your password at any time. 7. Enter your Password Reset Question and Answer. This can be used at a later time if you forget your password. 8. Enter your e-mail address. You will receive e-mail notification when new information is available in [address] 9. Click Sign Up. You can now view and download portions of your medical record. 10. Click the Download Summary menu link to download a portable copy of your medical information. Additional Information If you have questions, please visit the Frequently Asked Questions section of the BubbleNoise website at [email or web address] Remember, BubbleNoise is NOT to be used for urgent needs. For medical emergencies, dial 911. Please provide this summary of care documentation to your next provider. Your primary care clinician is listed as Adam Hayes. If you have any questions after today's visit, please call [phone].

## 2018-05-17 DIAGNOSIS — I10 BENIGN ESSENTIAL HTN: ICD-10-CM

## 2018-05-18 RX ORDER — CANDESARTAN 32 MG/1
32 TABLET ORAL DAILY
Qty: 30 TAB | Refills: 0 | Status: SHIPPED | OUTPATIENT
Start: 2018-05-18 | End: 2018-11-19 | Stop reason: SDUPTHER

## 2018-05-18 RX ORDER — CHLORTHALIDONE 25 MG/1
25 TABLET ORAL DAILY
Qty: 30 TAB | Refills: 0 | Status: SHIPPED | OUTPATIENT
Start: 2018-05-18 | End: 2018-11-19 | Stop reason: ALTCHOICE

## 2018-05-18 RX ORDER — AMLODIPINE BESYLATE 5 MG/1
5 TABLET ORAL DAILY
Qty: 30 TAB | Refills: 0 | Status: SHIPPED | OUTPATIENT
Start: 2018-05-18 | End: 2018-07-08 | Stop reason: SDUPTHER

## 2018-07-08 DIAGNOSIS — I10 BENIGN ESSENTIAL HTN: ICD-10-CM

## 2018-07-09 RX ORDER — AMLODIPINE BESYLATE 5 MG/1
TABLET ORAL
Qty: 30 TAB | Refills: 0 | Status: SHIPPED | OUTPATIENT
Start: 2018-07-09 | End: 2018-11-19

## 2018-11-16 ENCOUNTER — HOSPITAL ENCOUNTER (INPATIENT)
Age: 48
LOS: 3 days | Discharge: LEFT BEFORE DISCHARGE | DRG: 309 | End: 2018-11-19
Attending: EMERGENCY MEDICINE | Admitting: INTERNAL MEDICINE
Payer: SELF-PAY

## 2018-11-16 ENCOUNTER — APPOINTMENT (OUTPATIENT)
Dept: CT IMAGING | Age: 48
DRG: 309 | End: 2018-11-16
Attending: EMERGENCY MEDICINE
Payer: SELF-PAY

## 2018-11-16 DIAGNOSIS — I47.1 SVT (SUPRAVENTRICULAR TACHYCARDIA) (HCC): Primary | ICD-10-CM

## 2018-11-16 DIAGNOSIS — E87.6 HYPOKALEMIA: ICD-10-CM

## 2018-11-16 DIAGNOSIS — N39.0 URINARY TRACT INFECTION WITHOUT HEMATURIA, SITE UNSPECIFIED: ICD-10-CM

## 2018-11-16 LAB
ALBUMIN SERPL-MCNC: 4 G/DL (ref 3.5–5)
ALBUMIN/GLOB SERPL: 0.9 {RATIO} (ref 1.1–2.2)
ALP SERPL-CCNC: 80 U/L (ref 45–117)
ALT SERPL-CCNC: 35 U/L (ref 12–78)
ANION GAP SERPL CALC-SCNC: 9 MMOL/L (ref 5–15)
APPEARANCE UR: ABNORMAL
AST SERPL-CCNC: 21 U/L (ref 15–37)
ATRIAL RATE: 99 BPM
BACTERIA URNS QL MICRO: NEGATIVE /HPF
BASOPHILS # BLD: 0.1 K/UL (ref 0–0.1)
BASOPHILS NFR BLD: 0 % (ref 0–1)
BILIRUB SERPL-MCNC: 0.6 MG/DL (ref 0.2–1)
BILIRUB UR QL: NEGATIVE
BNP SERPL-MCNC: 3353 PG/ML (ref 0–125)
BUN SERPL-MCNC: 11 MG/DL (ref 6–20)
BUN/CREAT SERPL: 10 (ref 12–20)
CALCIUM SERPL-MCNC: 8.6 MG/DL (ref 8.5–10.1)
CALCULATED P AXIS, ECG09: 51 DEGREES
CALCULATED R AXIS, ECG10: 4 DEGREES
CALCULATED T AXIS, ECG11: -178 DEGREES
CHLORIDE SERPL-SCNC: 99 MMOL/L (ref 97–108)
CO2 SERPL-SCNC: 26 MMOL/L (ref 21–32)
COLOR UR: ABNORMAL
CREAT SERPL-MCNC: 1.15 MG/DL (ref 0.55–1.02)
DIAGNOSIS, 93000: NORMAL
DIFFERENTIAL METHOD BLD: ABNORMAL
EOSINOPHIL # BLD: 0.1 K/UL (ref 0–0.4)
EOSINOPHIL NFR BLD: 0 % (ref 0–7)
EPITH CASTS URNS QL MICRO: ABNORMAL /LPF
ERYTHROCYTE [DISTWIDTH] IN BLOOD BY AUTOMATED COUNT: 13.6 % (ref 11.5–14.5)
GLOBULIN SER CALC-MCNC: 4.3 G/DL (ref 2–4)
GLUCOSE SERPL-MCNC: 142 MG/DL (ref 65–100)
GLUCOSE UR STRIP.AUTO-MCNC: NEGATIVE MG/DL
HCG UR QL: NEGATIVE
HCT VFR BLD AUTO: 41.1 % (ref 35–47)
HGB BLD-MCNC: 14.1 G/DL (ref 11.5–16)
HGB UR QL STRIP: ABNORMAL
HYALINE CASTS URNS QL MICRO: ABNORMAL /LPF (ref 0–5)
IMM GRANULOCYTES # BLD: 0.2 K/UL (ref 0–0.04)
IMM GRANULOCYTES NFR BLD AUTO: 1 % (ref 0–0.5)
KETONES UR QL STRIP.AUTO: NEGATIVE MG/DL
LEUKOCYTE ESTERASE UR QL STRIP.AUTO: ABNORMAL
LYMPHOCYTES # BLD: 2.4 K/UL (ref 0.8–3.5)
LYMPHOCYTES NFR BLD: 10 % (ref 12–49)
MAGNESIUM SERPL-MCNC: 1.8 MG/DL (ref 1.6–2.4)
MCH RBC QN AUTO: 31.3 PG (ref 26–34)
MCHC RBC AUTO-ENTMCNC: 34.3 G/DL (ref 30–36.5)
MCV RBC AUTO: 91.3 FL (ref 80–99)
MONOCYTES # BLD: 1.3 K/UL (ref 0–1)
MONOCYTES NFR BLD: 6 % (ref 5–13)
NEUTS SEG # BLD: 19.6 K/UL (ref 1.8–8)
NEUTS SEG NFR BLD: 83 % (ref 32–75)
NITRITE UR QL STRIP.AUTO: NEGATIVE
NRBC # BLD: 0 K/UL (ref 0–0.01)
NRBC BLD-RTO: 0 PER 100 WBC
P-R INTERVAL, ECG05: 134 MS
PH UR STRIP: 5.5 [PH] (ref 5–8)
PLATELET # BLD AUTO: 390 K/UL (ref 150–400)
PMV BLD AUTO: 11.4 FL (ref 8.9–12.9)
POTASSIUM SERPL-SCNC: 2.8 MMOL/L (ref 3.5–5.1)
PROT SERPL-MCNC: 8.3 G/DL (ref 6.4–8.2)
PROT UR STRIP-MCNC: 30 MG/DL
Q-T INTERVAL, ECG07: 372 MS
QRS DURATION, ECG06: 92 MS
QTC CALCULATION (BEZET), ECG08: 477 MS
RBC # BLD AUTO: 4.5 M/UL (ref 3.8–5.2)
RBC #/AREA URNS HPF: ABNORMAL /HPF (ref 0–5)
SODIUM SERPL-SCNC: 134 MMOL/L (ref 136–145)
SP GR UR REFRACTOMETRY: 1.01 (ref 1–1.03)
TROPONIN I SERPL-MCNC: 0.05 NG/ML
TROPONIN I SERPL-MCNC: <0.05 NG/ML
UROBILINOGEN UR QL STRIP.AUTO: 0.2 EU/DL (ref 0.2–1)
VENTRICULAR RATE, ECG03: 99 BPM
WBC # BLD AUTO: 23.6 K/UL (ref 3.6–11)
WBC URNS QL MICRO: ABNORMAL /HPF (ref 0–4)

## 2018-11-16 PROCEDURE — 77030029684 HC NEB SM VOL KT MONA -A

## 2018-11-16 PROCEDURE — 74011250636 HC RX REV CODE- 250/636

## 2018-11-16 PROCEDURE — 85025 COMPLETE CBC W/AUTO DIFF WBC: CPT

## 2018-11-16 PROCEDURE — 94761 N-INVAS EAR/PLS OXIMETRY MLT: CPT

## 2018-11-16 PROCEDURE — 83735 ASSAY OF MAGNESIUM: CPT

## 2018-11-16 PROCEDURE — 94640 AIRWAY INHALATION TREATMENT: CPT

## 2018-11-16 PROCEDURE — 36415 COLL VENOUS BLD VENIPUNCTURE: CPT

## 2018-11-16 PROCEDURE — 74011000250 HC RX REV CODE- 250: Performed by: INTERNAL MEDICINE

## 2018-11-16 PROCEDURE — 87040 BLOOD CULTURE FOR BACTERIA: CPT

## 2018-11-16 PROCEDURE — 74011000258 HC RX REV CODE- 258: Performed by: EMERGENCY MEDICINE

## 2018-11-16 PROCEDURE — 74011250637 HC RX REV CODE- 250/637: Performed by: EMERGENCY MEDICINE

## 2018-11-16 PROCEDURE — 80053 COMPREHEN METABOLIC PANEL: CPT

## 2018-11-16 PROCEDURE — 74011250637 HC RX REV CODE- 250/637: Performed by: INTERNAL MEDICINE

## 2018-11-16 PROCEDURE — 74011250636 HC RX REV CODE- 250/636: Performed by: EMERGENCY MEDICINE

## 2018-11-16 PROCEDURE — 84484 ASSAY OF TROPONIN QUANT: CPT

## 2018-11-16 PROCEDURE — 99285 EMERGENCY DEPT VISIT HI MDM: CPT

## 2018-11-16 PROCEDURE — 96375 TX/PRO/DX INJ NEW DRUG ADDON: CPT

## 2018-11-16 PROCEDURE — 87086 URINE CULTURE/COLONY COUNT: CPT

## 2018-11-16 PROCEDURE — 93005 ELECTROCARDIOGRAM TRACING: CPT

## 2018-11-16 PROCEDURE — 83880 ASSAY OF NATRIURETIC PEPTIDE: CPT

## 2018-11-16 PROCEDURE — 81001 URINALYSIS AUTO W/SCOPE: CPT

## 2018-11-16 PROCEDURE — 74011636320 HC RX REV CODE- 636/320: Performed by: EMERGENCY MEDICINE

## 2018-11-16 PROCEDURE — 71275 CT ANGIOGRAPHY CHEST: CPT

## 2018-11-16 PROCEDURE — 74011250636 HC RX REV CODE- 250/636: Performed by: INTERNAL MEDICINE

## 2018-11-16 PROCEDURE — 96361 HYDRATE IV INFUSION ADD-ON: CPT

## 2018-11-16 PROCEDURE — 96365 THER/PROPH/DIAG IV INF INIT: CPT

## 2018-11-16 PROCEDURE — 81025 URINE PREGNANCY TEST: CPT

## 2018-11-16 PROCEDURE — 65660000000 HC RM CCU STEPDOWN

## 2018-11-16 RX ORDER — LOSARTAN POTASSIUM 100 MG/1
100 TABLET ORAL DAILY
Status: DISCONTINUED | OUTPATIENT
Start: 2018-11-17 | End: 2018-11-19 | Stop reason: HOSPADM

## 2018-11-16 RX ORDER — AMLODIPINE BESYLATE 5 MG/1
5 TABLET ORAL DAILY
Status: DISCONTINUED | OUTPATIENT
Start: 2018-11-16 | End: 2018-11-17

## 2018-11-16 RX ORDER — FLUTICASONE FUROATE AND VILANTEROL 100; 25 UG/1; UG/1
1 POWDER RESPIRATORY (INHALATION) DAILY
Status: DISCONTINUED | OUTPATIENT
Start: 2018-11-16 | End: 2018-11-19 | Stop reason: HOSPADM

## 2018-11-16 RX ORDER — CHLORTHALIDONE 25 MG/1
25 TABLET ORAL DAILY
Status: DISCONTINUED | OUTPATIENT
Start: 2018-11-16 | End: 2018-11-19 | Stop reason: HOSPADM

## 2018-11-16 RX ORDER — ONDANSETRON 2 MG/ML
4 INJECTION INTRAMUSCULAR; INTRAVENOUS
Status: DISCONTINUED | OUTPATIENT
Start: 2018-11-16 | End: 2018-11-19 | Stop reason: HOSPADM

## 2018-11-16 RX ORDER — POTASSIUM CHLORIDE 750 MG/1
40 TABLET, FILM COATED, EXTENDED RELEASE ORAL
Status: COMPLETED | OUTPATIENT
Start: 2018-11-16 | End: 2018-11-16

## 2018-11-16 RX ORDER — ADENOSINE 3 MG/ML
INJECTION, SOLUTION INTRAVENOUS
Status: COMPLETED
Start: 2018-11-16 | End: 2018-11-16

## 2018-11-16 RX ORDER — ALPRAZOLAM 0.5 MG/1
0.5 TABLET ORAL 2 TIMES DAILY
Status: DISCONTINUED | OUTPATIENT
Start: 2018-11-16 | End: 2018-11-17

## 2018-11-16 RX ORDER — SODIUM CHLORIDE 9 MG/ML
50 INJECTION, SOLUTION INTRAVENOUS
Status: COMPLETED | OUTPATIENT
Start: 2018-11-16 | End: 2018-11-16

## 2018-11-16 RX ORDER — ASPIRIN 325 MG
325 TABLET ORAL ONCE
Status: DISCONTINUED | OUTPATIENT
Start: 2018-11-16 | End: 2018-11-16

## 2018-11-16 RX ORDER — DOCUSATE SODIUM 100 MG/1
100 CAPSULE, LIQUID FILLED ORAL
Status: DISCONTINUED | OUTPATIENT
Start: 2018-11-16 | End: 2018-11-19 | Stop reason: HOSPADM

## 2018-11-16 RX ORDER — POTASSIUM CHLORIDE 1.5 G/1.77G
20 POWDER, FOR SOLUTION ORAL
Status: COMPLETED | OUTPATIENT
Start: 2018-11-16 | End: 2018-11-16

## 2018-11-16 RX ORDER — CHLORTHALIDONE 25 MG/1
25 TABLET ORAL ONCE
Status: COMPLETED | OUTPATIENT
Start: 2018-11-16 | End: 2018-11-16

## 2018-11-16 RX ORDER — LOSARTAN POTASSIUM 25 MG/1
25 TABLET ORAL ONCE
Status: COMPLETED | OUTPATIENT
Start: 2018-11-16 | End: 2018-11-16

## 2018-11-16 RX ORDER — SODIUM CHLORIDE 0.9 % (FLUSH) 0.9 %
5-10 SYRINGE (ML) INJECTION AS NEEDED
Status: DISCONTINUED | OUTPATIENT
Start: 2018-11-16 | End: 2018-11-19 | Stop reason: HOSPADM

## 2018-11-16 RX ORDER — ROPINIROLE 0.25 MG/1
0.25 TABLET, FILM COATED ORAL DAILY
Status: DISCONTINUED | OUTPATIENT
Start: 2018-11-16 | End: 2018-11-19 | Stop reason: HOSPADM

## 2018-11-16 RX ORDER — SODIUM CHLORIDE 0.9 % (FLUSH) 0.9 %
5-10 SYRINGE (ML) INJECTION EVERY 8 HOURS
Status: DISCONTINUED | OUTPATIENT
Start: 2018-11-16 | End: 2018-11-19 | Stop reason: HOSPADM

## 2018-11-16 RX ORDER — AMLODIPINE BESYLATE 5 MG/1
5 TABLET ORAL
Status: COMPLETED | OUTPATIENT
Start: 2018-11-16 | End: 2018-11-16

## 2018-11-16 RX ORDER — IBUPROFEN 200 MG
1 TABLET ORAL EVERY 24 HOURS
Status: DISCONTINUED | OUTPATIENT
Start: 2018-11-16 | End: 2018-11-19 | Stop reason: HOSPADM

## 2018-11-16 RX ORDER — SODIUM CHLORIDE 0.9 % (FLUSH) 0.9 %
10 SYRINGE (ML) INJECTION
Status: COMPLETED | OUTPATIENT
Start: 2018-11-16 | End: 2018-11-16

## 2018-11-16 RX ORDER — ADENOSINE 3 MG/ML
6 INJECTION, SOLUTION INTRAVENOUS
Status: COMPLETED | OUTPATIENT
Start: 2018-11-16 | End: 2018-11-16

## 2018-11-16 RX ORDER — IPRATROPIUM BROMIDE AND ALBUTEROL SULFATE 2.5; .5 MG/3ML; MG/3ML
3 SOLUTION RESPIRATORY (INHALATION)
Status: DISCONTINUED | OUTPATIENT
Start: 2018-11-16 | End: 2018-11-17

## 2018-11-16 RX ORDER — HEPARIN SODIUM 5000 [USP'U]/ML
5000 INJECTION, SOLUTION INTRAVENOUS; SUBCUTANEOUS EVERY 8 HOURS
Status: DISCONTINUED | OUTPATIENT
Start: 2018-11-16 | End: 2018-11-19 | Stop reason: HOSPADM

## 2018-11-16 RX ADMIN — HEPARIN SODIUM 5000 UNITS: 5000 INJECTION INTRAVENOUS; SUBCUTANEOUS at 09:38

## 2018-11-16 RX ADMIN — Medication 10 ML: at 13:13

## 2018-11-16 RX ADMIN — HEPARIN SODIUM 5000 UNITS: 5000 INJECTION INTRAVENOUS; SUBCUTANEOUS at 16:00

## 2018-11-16 RX ADMIN — ALPRAZOLAM 0.5 MG: 0.5 TABLET ORAL at 09:37

## 2018-11-16 RX ADMIN — ROPINIROLE HYDROCHLORIDE 0.25 MG: 0.25 TABLET, FILM COATED ORAL at 10:37

## 2018-11-16 RX ADMIN — IPRATROPIUM BROMIDE AND ALBUTEROL SULFATE 3 ML: .5; 3 SOLUTION RESPIRATORY (INHALATION) at 21:05

## 2018-11-16 RX ADMIN — METHYLPREDNISOLONE SODIUM SUCCINATE 40 MG: 40 INJECTION, POWDER, FOR SOLUTION INTRAMUSCULAR; INTRAVENOUS at 13:13

## 2018-11-16 RX ADMIN — Medication 10 ML: at 09:48

## 2018-11-16 RX ADMIN — CEFTRIAXONE 1 G: 1 INJECTION, POWDER, FOR SOLUTION INTRAMUSCULAR; INTRAVENOUS at 06:54

## 2018-11-16 RX ADMIN — SODIUM CHLORIDE 1000 ML: 900 INJECTION, SOLUTION INTRAVENOUS at 06:11

## 2018-11-16 RX ADMIN — ALPRAZOLAM 0.5 MG: 0.5 TABLET ORAL at 17:37

## 2018-11-16 RX ADMIN — CHLORTHALIDONE 25 MG: 25 TABLET ORAL at 06:53

## 2018-11-16 RX ADMIN — POTASSIUM CHLORIDE 40 MEQ: 750 TABLET, FILM COATED, EXTENDED RELEASE ORAL at 06:15

## 2018-11-16 RX ADMIN — AMLODIPINE BESYLATE 5 MG: 5 TABLET ORAL at 06:07

## 2018-11-16 RX ADMIN — AZITHROMYCIN MONOHYDRATE 500 MG: 500 INJECTION, POWDER, LYOPHILIZED, FOR SOLUTION INTRAVENOUS at 09:37

## 2018-11-16 RX ADMIN — IPRATROPIUM BROMIDE AND ALBUTEROL SULFATE 3 ML: .5; 3 SOLUTION RESPIRATORY (INHALATION) at 09:10

## 2018-11-16 RX ADMIN — METHYLPREDNISOLONE SODIUM SUCCINATE 40 MG: 40 INJECTION, POWDER, FOR SOLUTION INTRAMUSCULAR; INTRAVENOUS at 17:37

## 2018-11-16 RX ADMIN — METHYLPREDNISOLONE SODIUM SUCCINATE 40 MG: 40 INJECTION, POWDER, FOR SOLUTION INTRAMUSCULAR; INTRAVENOUS at 09:38

## 2018-11-16 RX ADMIN — CHLORTHALIDONE 25 MG: 25 TABLET ORAL at 10:36

## 2018-11-16 RX ADMIN — Medication 10 ML: at 06:04

## 2018-11-16 RX ADMIN — Medication 10 ML: at 22:13

## 2018-11-16 RX ADMIN — FLUTICASONE FUROATE AND VILANTEROL TRIFENATATE 1 PUFF: 100; 25 POWDER RESPIRATORY (INHALATION) at 10:37

## 2018-11-16 RX ADMIN — ADENOSINE 6 MG: 3 INJECTION, SOLUTION INTRAVENOUS at 00:50

## 2018-11-16 RX ADMIN — AMLODIPINE BESYLATE 5 MG: 5 TABLET ORAL at 09:37

## 2018-11-16 RX ADMIN — IOPAMIDOL 80 ML: 755 INJECTION, SOLUTION INTRAVENOUS at 06:04

## 2018-11-16 RX ADMIN — LOSARTAN POTASSIUM 25 MG: 25 TABLET ORAL at 06:07

## 2018-11-16 RX ADMIN — POTASSIUM CHLORIDE 20 MEQ: 1.5 POWDER, FOR SOLUTION ORAL at 06:24

## 2018-11-16 RX ADMIN — SODIUM CHLORIDE 50 ML/HR: 900 INJECTION, SOLUTION INTRAVENOUS at 06:04

## 2018-11-16 NOTE — H&P
Hospitalist Admission Note    NAME: Suad Jarrell   :  1970   MRN:  [id number]     Date/Time:  2018 8:27 AM    Patient PCP: Artie High MD  ________________________________________________________________________    My assessment of this patient's clinical condition and my plan of care is as follows. Assessment / Plan:  Supraventricular tachycardia  Etiology may be related to COPD  She responded to any  We will check troponins x2, replace potassium  Consult cardiology  Check 2D echocardiogram to rule out structural heart disease  Telemetry monitoring  She is currently hemodynamically stable    Shortness of breath cough and phlegm most likely due to COPD exacerbation  She has not been taking her inhalers and is now coming with shortness of breath cough yellow phlegm  On exam she does have wheezing  CT of chest showed no infiltrate  All above features most likely consistent with COPD exacerbation  Start Solu-Medrol, DuoNeb and azithromycin  Start Mucinex  Nicotine patch  Counseled regarding smoking cessation    Leucocytosis may be reactive, will rule out infectious cause  She is afebrile, CTA chest shows no infiltrate  UA is abnormal but not a clean catch, urine cx sent  Check cbc in am   Check blood cx    Hypokalemia  replaced    Hypertension uncontrolled  Resume amlodipine, candesartan, chlorthalidone next    Generalized anxiety disorder  Resume home Xanax    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  Patient reports she has not been taking her medication since the last 1 month due to lack of insurance.  She reports that started having sudden onset of palpitations this a.m. with midsternal chest pain which she described as a sensation of her heart beating fast.  She reports palpitations were regular lasted for about 10 minutes and resolved and came back.  She also reports shortness of breath which is mostly worse with exertion with clear phlegm.  She does not report any syncopal episodes.  She reports that she has been smoking close to 1 pack of cigarettes. Kristin Schaefer has not been taking her inhalers. Kristin Schaefer does not report any long-distance travel. Kristin Schaefer does not report being on any oral contraceptives.  She does not report any trauma to the chest.    On arrival to the hospital she was noted to have a heart rate of 151 with a blood pressure of 160/106.  She was noted to have a white count of 23,000.  Potassium was 2.8 and creatinine was 1. 15.  Positive troponin was 0.05.  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  She was given 6 mg of adenosine with conversion to normal sinus rhythm. We were asked to admit for work up and evaluation of the above problems.      Past Medical History:   Diagnosis Date    Anemia 2012    Essential hypertension     HX OTHER MEDICAL     2009 baby put up for adoption, tested + for cocaine with that pregnancy    Hypertension     SVT (supraventricular tachycardia) ([facility] 2018        Past Surgical History:   Procedure Laterality Date    HX CHOLECYSTECTOMY      HX GYN             Social History     Tobacco Use    Smoking status: Current Every Day Smoker     Packs/day: 0.50    Smokeless tobacco: Never Used   Substance Use Topics    Alcohol use: Yes        Family History   Problem Relation Age of Onset    Hypertension Father      Allergies   Allergen Reactions    Hydralazine Unknown (comments)     jitteriness    Hydromorphone Itching     morphine    Morphine Itching    Sulfa (Sulfonamide Antibiotics) Nausea and Vomiting        Prior to Admission medications    Medication Sig Start Date End Date Taking? Authorizing Provider   amLODIPine (NORVASC) 5 mg tablet TAKE ONE TABLET BY MOUTH EVERY DAY 7/9/18   Jori Britton MD   candesartan (ATACAND) 32 mg tablet Take 1 Tab by mouth daily. 5/18/18   Jori Britton MD   chlorthalidone (HYGROTEN) 25 mg tablet Take 1 Tab by mouth daily. 5/18/18   Jori Britton MD   ALPRAZolam Waylan Vladislav) 0.5 mg tablet Take 1 Tab by mouth two (2) times a day. Max Daily Amount: 1 mg. 3/27/18   Jori Britton MD   albuterol (PROVENTIL HFA, VENTOLIN HFA, PROAIR HFA) 90 mcg/actuation inhaler Take 1 Puff by inhalation every six (6) hours as needed for Wheezing or Shortness of Breath (cough). 2/19/18   Jori Britton MD   budesonide-formoterol [facility]) 160-4.5 mcg/actuation HFAA Take 2 Puffs by inhalation two (2) times a day. 2/19/18   Jori Britton MD   albuterol-ipratropium (DUO-NEB) 2.5 mg-0.5 mg/3 ml nebu 3 mL by Nebulization route every six (6) hours as needed. 7/7/16   Darcie Carpio MD       REVIEW OF SYSTEMS:     I am not able to complete the review of systems because:    The patient is intubated and sedated    The patient has altered mental status due to his acute medical problems    The patient has baseline aphasia from prior stroke(s)    The patient has baseline dementia and is not reliable historian    The patient is in acute medical distress and unable to provide information           Total of 12 systems reviewed as follows:       POSITIVE= underlined text  Negative = text not underlined  General:  fever, chills, sweats, generalized weakness, weight loss/gain,      loss of appetite   Eyes:    blurred vision, eye pain, loss of vision, double vision  ENT:    rhinorrhea, pharyngitis   Respiratory: cough, sputum production, SOB, ZAPATA, wheezing, pleuritic pain   Cardiology:   chest pain, palpitations, orthopnea, PND, edema, syncope   Gastrointestinal:  abdominal pain , N/V, diarrhea, dysphagia, constipation, bleeding   Genitourinary:  frequency, urgency, dysuria, hematuria, incontinence   Muskuloskeletal :  arthralgia, myalgia, back pain  Hematology:  easy bruising, nose or gum bleeding, lymphadenopathy   Dermatological: rash, ulceration, pruritis, color change / jaundice  Endocrine:   hot flashes or polydipsia   Neurological:  headache, dizziness, confusion, focal weakness, paresthesia,     Speech difficulties, memory loss, gait difficulty  Psychological: Feelings of anxiety, depression, agitation    Objective:   VITALS:    Visit Vitals  BP (!) 143/111   Pulse 92   Temp 97.9 °F (36.6 °C)   Resp 21   Ht 5' 7\" (1.702 m)   Wt 74.8 kg (165 lb)   SpO2 98%   BMI 25.84 kg/m²       PHYSICAL EXAM:    General:    Alert, cooperative, no distress, appears stated age. HEENT: Atraumatic, anicteric sclerae, pink conjunctivae     No oral ulcers, mucosa moist  Neck:  Supple, symmetrical,  thyroid: non tender  Lungs:   Poor air entry, wheezing +, no crackes . Chest wall:  No tenderness  No Accessory muscle use. Heart:   Regular  rhythm,  No  murmur   No edema  Abdomen:   Soft, non-tender. Not distended. Bowel sounds normal  Extremities: No cyanosis. No clubbing,      Skin turgor normal, Capillary refill normal, Radial dial pulse 2+  Skin:     Not Jaundiced  No rashes   Psych:  Not anxious or agitated. Neurologic: EOMs intact. No facial asymmetry. No aphasia or slurred speech. Symmetrical strength, Sensation grossly intact.  Alert and oriented X 4.     _______________________________________________________________________  Care Plan discussed with:    Comments   Patient y    Family  y    RN y    Care Manager                    Consultant: _______________________________________________________________________  Expected  Disposition:   Home with Family y   HH/PT/OT/RN    SNF/LTC    TOSHA    ________________________________________________________________________  TOTAL TIME:  61  Minutes    Critical Care Provided     Minutes non procedure based      Comments    y Reviewed previous records   >50% of visit spent in counseling and coordination of care y Discussion with patient and/or family and questions answered       ________________________________________________________________________  Signed: Clementine Vicente MD    Procedures: see electronic medical records for all procedures/Xrays and details which were not copied into this note but were reviewed prior to creation of Plan. LAB DATA REVIEWED:    Recent Results (from the past 24 hour(s))   EKG, 12 LEAD, INITIAL    Collection Time: 11/16/18  4:25 AM   Result Value Ref Range    Ventricular Rate 152 BPM    Atrial Rate 0 BPM    QRS Duration 166 ms    Q-T Interval 326 ms    QTC Calculation (Bezet) 518 ms    Calculated R Axis 0 degrees    Calculated T Axis 126 degrees    Diagnosis       Wide QRS tachycardia  Nonspecific intraventricular block  When compared with ECG of 01-DEC-2016 16:02,  Wide QRS tachycardia has replaced Sinus rhythm  Vent. rate has increased BY  82 BPM     CBC WITH AUTOMATED DIFF    Collection Time: 11/16/18  4:59 AM   Result Value Ref Range    WBC 23.6 (H) 3.6 - 11.0 K/uL    RBC 4.50 3.80 - 5.20 M/uL    HGB 14.1 11.5 - 16.0 g/dL    HCT 41.1 35.0 - 47.0 %    MCV 91.3 80.0 - 99.0 FL    MCH 31.3 26.0 - 34.0 PG    MCHC 34.3 30.0 - 36.5 g/dL    RDW 13.6 11.5 - 14.5 %    PLATELET 885 547 - 576 K/uL    MPV 11.4 8.9 - 12.9 FL    NRBC 0.0 0  WBC    ABSOLUTE NRBC 0.00 0.00 - 0.01 K/uL    NEUTROPHILS 83 (H) 32 - 75 %    LYMPHOCYTES 10 (L) 12 - 49 %    MONOCYTES 6 5 - 13 %    EOSINOPHILS 0 0 - 7 %    BASOPHILS 0 0 - 1 %    IMMATURE GRANULOCYTES 1 (H) 0.0 - 0.5 %    ABS.  NEUTROPHILS 19.6 (H) 1.8 - 8.0 K/UL    ABS. LYMPHOCYTES 2.4 0.8 - 3.5 K/UL    ABS. MONOCYTES 1.3 (H) 0.0 - 1.0 K/UL    ABS. EOSINOPHILS 0.1 0.0 - 0.4 K/UL    ABS. BASOPHILS 0.1 0.0 - 0.1 K/UL    ABS. IMM. GRANS. 0.2 (H) 0.00 - 0.04 K/UL    DF AUTOMATED     METABOLIC PANEL, COMPREHENSIVE    Collection Time: 11/16/18  4:59 AM   Result Value Ref Range    Sodium 134 (L) 136 - 145 mmol/L    Potassium 2.8 (L) 3.5 - 5.1 mmol/L    Chloride 99 97 - 108 mmol/L    CO2 26 21 - 32 mmol/L    Anion gap 9 5 - 15 mmol/L    Glucose 142 (H) 65 - 100 mg/dL    BUN 11 6 - 20 MG/DL    Creatinine 1.15 (H) 0.55 - 1.02 MG/DL    BUN/Creatinine ratio 10 (L) 12 - 20      GFR est AA >60 >60 ml/min/1.73m2    GFR est non-AA 51 (L) >60 ml/min/1.73m2    Calcium 8.6 8.5 - 10.1 MG/DL    Bilirubin, total 0.6 0.2 - 1.0 MG/DL    ALT (SGPT) 35 12 - 78 U/L    AST (SGOT) 21 15 - 37 U/L    Alk.  phosphatase 80 45 - 117 U/L    Protein, total 8.3 (H) 6.4 - 8.2 g/dL    Albumin 4.0 3.5 - 5.0 g/dL    Globulin 4.3 (H) 2.0 - 4.0 g/dL    A-G Ratio 0.9 (L) 1.1 - 2.2     NT-PRO BNP    Collection Time: 11/16/18  4:59 AM   Result Value Ref Range    NT pro-BNP 3,353 (H) 0 - 125 PG/ML   TROPONIN I    Collection Time: 11/16/18  4:59 AM   Result Value Ref Range    Troponin-I, Qt. 0.05 (H) <0.05 ng/mL   URINALYSIS W/ RFLX MICROSCOPIC    Collection Time: 11/16/18  4:59 AM   Result Value Ref Range    Color YELLOW/STRAW      Appearance CLOUDY (A) CLEAR      Specific gravity 1.006 1.003 - 1.030      pH (UA) 5.5 5.0 - 8.0      Protein 30 (A) NEG mg/dL    Glucose NEGATIVE  NEG mg/dL    Ketone NEGATIVE  NEG mg/dL    Bilirubin NEGATIVE  NEG      Blood SMALL (A) NEG      Urobilinogen 0.2 0.2 - 1.0 EU/dL    Nitrites NEGATIVE  NEG      Leukocyte Esterase LARGE (A) NEG      WBC  0 - 4 /hpf    RBC 5-10 0 - 5 /hpf    Epithelial cells MANY (A) FEW /lpf    Bacteria NEGATIVE  NEG /hpf    Hyaline cast 0-2 0 - 5 /lpf   MAGNESIUM    Collection Time: 11/16/18  4:59 AM   Result Value Ref Range    Magnesium 1. 8 1.6 - 2.4 mg/dL   HCG URINE, QL. - POC    Collection Time: 11/16/18  5:40 AM   Result Value Ref Range    Pregnancy test,urine (POC) NEGATIVE  NEG

## 2018-11-16 NOTE — PROGRESS NOTES
Pharmacy Medication Reconciliation     The patient was interviewed regarding current PTA medication list, use and drug allergies;  patient only present in room and obtained permission from patient to discuss drug regimen with visitor(s) present. The patient was questioned regarding use of any other inhalers, topical products, over the counter medications, herbal medications, vitamin products or ophthalmic/nasal/otic medication use. Allergy Update: hydralazine, hydromorphone, morphine, sulfa    Recommendations/Findings: The following amendments were made to the patient's active medication list on file at [facility]:   1) Additions: none    2) Deletions: none    3) Changes: none    Note: Patient's insurance changed and hasn't been able to  any of these medications listed below for over 3 months.       -Clarified PTA med list with patient interview. PTA medication list was corrected to the following:     Prior to Admission Medications   Prescriptions Last Dose Informant Patient Reported? Taking? ALPRAZolam (XANAX) 0.5 mg tablet Not Taking at Unknown time Self No No   Sig: Take 1 Tab by mouth two (2) times a day. Max Daily Amount: 1 mg.   albuterol (PROVENTIL HFA, VENTOLIN HFA, PROAIR HFA) 90 mcg/actuation inhaler Not Taking at Unknown time Self No No   Sig: Take 1 Puff by inhalation every six (6) hours as needed for Wheezing or Shortness of Breath (cough). albuterol-ipratropium (DUO-NEB) 2.5 mg-0.5 mg/3 ml nebu Not Taking at Unknown time Self No No   Sig: 3 mL by Nebulization route every six (6) hours as needed. amLODIPine (NORVASC) 5 mg tablet Not Taking at Unknown time Self No No   Sig: TAKE ONE TABLET BY MOUTH EVERY DAY   budesonide-formoterol (SYMBICORT) 160-4.5 mcg/actuation HFAA Not Taking at Unknown time Self No No   Sig: Take 2 Puffs by inhalation two (2) times a day. candesartan (ATACAND) 32 mg tablet Not Taking at Unknown time Self No No   Sig: Take 1 Tab by mouth daily.    chlorthalidone (HYGROTEN) 25 mg tablet Not Taking at Unknown time Self No No   Sig: Take 1 Tab by mouth daily.       Facility-Administered Medications: None          Thank you,  MAU Morgan

## 2018-11-16 NOTE — CONSULTS
Consult    NAME: Nicoletta Phoenix   :  1970   MRN:  [id number]     Date/Time:  2018 10:35 AM    Patient PCP: Jadyn Preston MD  ________________________________________________________________________     Assessment:     1. Paroxysmal supraventricular tachycardia; adenosine-sensitive  2. Hypokalemia  3. Leukocytosis  4. Hypertension  5. COPD  6. Active cocaine use (intranasal)  7. Tobacco abuse        Plan:   EKG:  SR, PVC, lateral TW changes when compared to   Echo normal in     Given adenosine in the ER  CTA w/o PE    1. PSVT resolved without intervention -- now in NSR  2. Echo ordered by primary; will review when done and make any additional recommendations as needed  3. Last cocaine used reported to be last week  4. Ideally she would be on a beta blocker but this is contraindicated with active cocaine use  5. Continue with chlorthalidone 25mg, norvasc 5mg, candesartan 32mg  6. Will likely need a 30 day event monitor as an outpt; she never followed up last time  7. Will need to see Dr. Clovis Simental in 4-6 weeks; she never followed up last time  8. She should probably stop doing cocaine    Thank you for this consult and allowing me to take part in this patients care. Please call with questions. [x]        High complexity decision making was performed        Subjective:   CHIEF COMPLAINT: CP/SOB    HISTORY OF PRESENT ILLNESS:     Isaiah Bennett is a 57 y.o.    female who \"coming to the hospital with chief complaints of chest pain, palpitations and also shortness of breath since the last 1 week.  Patient reports she has not been taking her medication since the last 1 month due to lack of insurance.  She reports that started having sudden onset of palpitations this a.m. with midsternal chest pain which she described as a sensation of her heart beating fast.  She reports palpitations were regular lasted for about 10 minutes and resolved and came back.  She also reports shortness of breath which is mostly worse with exertion with clear phlegm.  She does not report any syncopal episodes.  She reports that she has been smoking close to 1 pack of cigarettes. Nikkie Burroughs has not been taking her inhalers. Nikkie Burroughs does not report any long-distance travel. Nikkie Burroughs does not report being on any oral contraceptives.  She does not report any trauma to the chest.    On arrival to the hospital she was noted to have a heart rate of 151 with a blood pressure of 160/106.  She was noted to have a white count of 23,000.  Potassium was 2.8 and creatinine was 1. 15.  Positive troponin was 0.05.  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  She was given 6 mg of adenosine with conversion to normal sinus rhythm. \"      We were asked to consult for work up and evaluation of the above problems.      Past Medical History:   Diagnosis Date    Anemia 2012    Essential hypertension     HX OTHER MEDICAL     2009 baby put up for adoption, tested + for cocaine with that pregnancy    Hypertension     SVT (supraventricular tachycardia) ([facility] [address] 2018      Past Surgical History:   Procedure Laterality Date    HX CHOLECYSTECTOMY      HX GYN           Allergies   Allergen Reactions    Hydralazine Unknown (comments)     jitteriness    Hydromorphone Itching     morphine    Morphine Itching    Sulfa (Sulfonamide Antibiotics) Nausea and Vomiting      Meds:  See below  Social History     Tobacco Use    Smoking status: Current Every Day Smoker     Packs/day: 0.50    Smokeless tobacco: Never Used   Substance Use Topics    Alcohol use: Yes      Family History   Problem Relation Age of Onset    Hypertension Father        REVIEW OF SYSTEMS:     []         Unable to obtain  ROS due to ---   [x]         Total of 12 systems reviewed as follows:    Constitutional: negative fever, negative chills, negative weight loss  Eyes:   negative visual changes  ENT: negative sore throat, tongue or lip swelling  Respiratory:  negative cough, negative dyspnea  Cards:  negative for chest pain, palpitations, lower extremity edema  GI:   negative for nausea, vomiting, diarrhea, and abdominal pain  Genitourinary: negative for frequency, dysuria  Integument:  negative for rash   Hematologic:  negative for easy bruising and gum/nose bleeding  Musculoskel: negative for myalgias,  back pain  Neurological:  negative for headaches, dizziness, vertigo, weakness  Behavl/Psych: negative for feelings of anxiety, depression     Pertinent Positives include :    Objective:      Physical Exam:    Last 24hrs VS reviewed since prior progress note. Most recent are:    Visit Vitals  BP (!) 143/111   Pulse 92   Temp 98 °F (36.7 °C)   Resp 21   Ht 5' 7\" (1.702 m)   Wt 74.8 kg (165 lb)   LMP 10/16/2018   SpO2 97%   BMI 25.84 kg/m²     No intake or output data in the 24 hours ending 11/16/18 1035     General Appearance: Well developed, well nourished, alert & oriented x 3,    no acute distress. Ears/Nose/Mouth/Throat: Pupils equal and round, Hearing grossly normal.  Neck: Supple. JVP within normal limits. Carotids good upstrokes, with no bruit. Chest: Lungs w/ dec BS bilat  Cardiovascular: Regular rate and rhythm, S1S2 normal, no murmur, rubs, gallops. Abdomen: Soft, non-tender, bowel sounds are active. No organomegaly. Extremities: No edema bilaterally. Femoral pulses +2, Distal Pulses +1. Skin: Warm and dry. Neuro: CN II-XII grossly intact, Strength and sensation grossly intact. []         Post-cath site without hematoma, bruit, tenderness, or thrill. Distal pulses intact. Data:      Telemetry:  SR    EKG:  SR, PVC, lateral TWI  []  No new EKG for review. Prior to Admission medications    Medication Sig Start Date End Date Taking?  Authorizing Provider   amLODIPine (NORVASC) 5 mg tablet TAKE ONE TABLET BY MOUTH EVERY DAY 7/9/18   Fifi Mendoza., MD   candesartan (ATACAND) 32 mg tablet Take 1 Tab by mouth daily. 5/18/18   Juju Jones MD   chlorthalidone (HYGROTEN) 25 mg tablet Take 1 Tab by mouth daily. 5/18/18   Juju Jones MD   ALPRAZolam [place]) 0.5 mg tablet Take 1 Tab by mouth two (2) times a day. Max Daily Amount: 1 mg. 3/27/18   Juju Jones MD   albuterol (PROVENTIL HFA, VENTOLIN HFA, PROAIR HFA) 90 mcg/actuation inhaler Take 1 Puff by inhalation every six (6) hours as needed for Wheezing or Shortness of Breath (cough). 2/19/18   Juju Jones MD   budesonide-formoterol [facility]) 160-4.5 mcg/actuation HFAA Take 2 Puffs by inhalation two (2) times a day. 2/19/18   Juju Jones MD   albuterol-ipratropium (DUO-NEB) 2.5 mg-0.5 mg/3 ml nebu 3 mL by Nebulization route every six (6) hours as needed.  7/7/16   Janette Guerrero MD       Recent Results (from the past 24 hour(s))   EKG, 12 LEAD, INITIAL    Collection Time: 11/16/18  4:25 AM   Result Value Ref Range    Ventricular Rate 99 BPM    Atrial Rate 99 BPM    P-R Interval 134 ms    QRS Duration 92 ms    Q-T Interval 372 ms    QTC Calculation (Bezet) 477 ms    Calculated P Axis 51 degrees    Calculated R Axis 4 degrees    Calculated T Axis -178 degrees    Diagnosis       Sinus rhythm with occasional premature ventricular complexes  Biatrial enlargement  ST & T wave abnormality, consider inferolateral ischemia  Prolonged QT    Confirmed by Alex Porter, P.VSilvia ([id number]) on 11/16/2018 9:12:29 AM     CBC WITH AUTOMATED DIFF    Collection Time: 11/16/18  4:59 AM   Result Value Ref Range    WBC 23.6 (H) 3.6 - 11.0 K/uL    RBC 4.50 3.80 - 5.20 M/uL    HGB 14.1 11.5 - 16.0 g/dL    HCT 41.1 35.0 - 47.0 %    MCV 91.3 80.0 - 99.0 FL    MCH 31.3 26.0 - 34.0 PG    MCHC 34.3 30.0 - 36.5 g/dL    RDW 13.6 11.5 - 14.5 %    PLATELET 932 007 - 874 K/uL    MPV 11.4 8.9 - 12.9 FL    NRBC 0.0 0  WBC    ABSOLUTE NRBC 0.00 0.00 - 0.01 K/uL    NEUTROPHILS 83 (H) 32 - 75 %    LYMPHOCYTES 10 (L) 12 - 49 % MONOCYTES 6 5 - 13 %    EOSINOPHILS 0 0 - 7 %    BASOPHILS 0 0 - 1 %    IMMATURE GRANULOCYTES 1 (H) 0.0 - 0.5 %    ABS. NEUTROPHILS 19.6 (H) 1.8 - 8.0 K/UL    ABS. LYMPHOCYTES 2.4 0.8 - 3.5 K/UL    ABS. MONOCYTES 1.3 (H) 0.0 - 1.0 K/UL    ABS. EOSINOPHILS 0.1 0.0 - 0.4 K/UL    ABS. BASOPHILS 0.1 0.0 - 0.1 K/UL    ABS. IMM. GRANS. 0.2 (H) 0.00 - 0.04 K/UL    DF AUTOMATED     METABOLIC PANEL, COMPREHENSIVE    Collection Time: 11/16/18  4:59 AM   Result Value Ref Range    Sodium 134 (L) 136 - 145 mmol/L    Potassium 2.8 (L) 3.5 - 5.1 mmol/L    Chloride 99 97 - 108 mmol/L    CO2 26 21 - 32 mmol/L    Anion gap 9 5 - 15 mmol/L    Glucose 142 (H) 65 - 100 mg/dL    BUN 11 6 - 20 MG/DL    Creatinine 1.15 (H) 0.55 - 1.02 MG/DL    BUN/Creatinine ratio 10 (L) 12 - 20      GFR est AA >60 >60 ml/min/1.73m2    GFR est non-AA 51 (L) >60 ml/min/1.73m2    Calcium 8.6 8.5 - 10.1 MG/DL    Bilirubin, total 0.6 0.2 - 1.0 MG/DL    ALT (SGPT) 35 12 - 78 U/L    AST (SGOT) 21 15 - 37 U/L    Alk.  phosphatase 80 45 - 117 U/L    Protein, total 8.3 (H) 6.4 - 8.2 g/dL    Albumin 4.0 3.5 - 5.0 g/dL    Globulin 4.3 (H) 2.0 - 4.0 g/dL    A-G Ratio 0.9 (L) 1.1 - 2.2     NT-PRO BNP    Collection Time: 11/16/18  4:59 AM   Result Value Ref Range    NT pro-BNP 3,353 (H) 0 - 125 PG/ML   TROPONIN I    Collection Time: 11/16/18  4:59 AM   Result Value Ref Range    Troponin-I, Qt. 0.05 (H) <0.05 ng/mL   URINALYSIS W/ RFLX MICROSCOPIC    Collection Time: 11/16/18  4:59 AM   Result Value Ref Range    Color YELLOW/STRAW      Appearance CLOUDY (A) CLEAR      Specific gravity 1.006 1.003 - 1.030      pH (UA) 5.5 5.0 - 8.0      Protein 30 (A) NEG mg/dL    Glucose NEGATIVE  NEG mg/dL    Ketone NEGATIVE  NEG mg/dL    Bilirubin NEGATIVE  NEG      Blood SMALL (A) NEG      Urobilinogen 0.2 0.2 - 1.0 EU/dL    Nitrites NEGATIVE  NEG      Leukocyte Esterase LARGE (A) NEG      WBC  0 - 4 /hpf    RBC 5-10 0 - 5 /hpf    Epithelial cells MANY (A) FEW /lpf    Bacteria NEGATIVE  NEG /hpf    Hyaline cast 0-2 0 - 5 /lpf   MAGNESIUM    Collection Time: 11/16/18  4:59 AM   Result Value Ref Range    Magnesium 1.8 1.6 - 2.4 mg/dL   HCG URINE, QL. - POC    Collection Time: 11/16/18  5:40 AM   Result Value Ref Range    Pregnancy test,urine (POC) NEGATIVE  NEG          Lizzette Cox III, DO

## 2018-11-16 NOTE — ED NOTES
Verbal shift change report given to Bg  (oncoming nurse) by Jacquelyn Smith  (offgoing nurse). Report included the following information ED Summary and MAR.     Will transfer to room 30

## 2018-11-16 NOTE — ED PROVIDER NOTES
EMERGENCY DEPARTMENT HISTORY AND PHYSICAL EXAM      Date: 2018  Patient Name: Scott Blair    History of Presenting Illness     Chief Complaint   Patient presents with    Chest Pain     Pt arrives via EMS c/o chest pain and shortness of breath since 1 am today. Pt has had a cough x 1 week with a hx of COPD. Pt found by EMS to have a HR 0f 151.  Shortness of Breath    Rapid Heart Rate       History Provided By: Patient    HPI: Scott Blair, 57 y.o. female with PMHx significant for HTN, COPD, anemia, presents via EMS transport to the ED with cc of sudden onset palpitations, mid-sternal CP, and SOB x 0100 today. Pt reports that she has been feeling sick with a cough for ~ a week, and notes that tonight when sitting on the cough she suddenly began experiencing her sx's. She notes that she has not taken any of her daily medications including antihypertensives in over 1 month due to not having insurance coverage. Per EMS, pt was found to have a HR of 151. EMS states they did not give the pt any medications en route. Pt denies a hx of rapid HR or any cardiac hx. She denies any use of estrogen/hormone therapy. Pt denies a hx of blood clots. She denies any alleviating/exacerbating factors for her current sx's. Pt specifically denies any fever, chills, SOB, CP, HA, N/V/D, abdominal pain, dysuria, hematuria, or rash. There are no other complaints, changes, or physical findings at this time.     Social Hx: + EtOH; + Smoker (1/2 ppd); - Illicit Drugs  Family Hx: HTN  Surgical Hx: Cholecystectomy,     PCP: Shannon Jesus MD    Current Facility-Administered Medications   Medication Dose Route Frequency Provider Last Rate Last Dose    chlorthalidone (HYGROTEN) tablet 25 mg  25 mg Oral Alfonso Celis MD        cefTRIAXone (ROCEPHIN) 1 g in 0.9% sodium chloride (MBP/ADV) 50 mL MBP  1 g IntraVENous Q24H Adalberto Burgos MD         Current Outpatient Medications   Medication Sig Dispense Refill    amLODIPine (NORVASC) 5 mg tablet TAKE ONE TABLET BY MOUTH EVERY DAY 30 Tab 0    candesartan (ATACAND) 32 mg tablet Take 1 Tab by mouth daily. 30 Tab 0    chlorthalidone (HYGROTEN) 25 mg tablet Take 1 Tab by mouth daily. 30 Tab 0    ALPRAZolam (XANAX) 0.5 mg tablet Take 1 Tab by mouth two (2) times a day. Max Daily Amount: 1 mg. 60 Tab 0    albuterol (PROVENTIL HFA, VENTOLIN HFA, PROAIR HFA) 90 mcg/actuation inhaler Take 1 Puff by inhalation every six (6) hours as needed for Wheezing or Shortness of Breath (cough). 1 Inhaler 3    budesonide-formoterol (SYMBICORT) 160-4.5 mcg/actuation HFAA Take 2 Puffs by inhalation two (2) times a day. 1 Inhaler 4    albuterol-ipratropium (DUO-NEB) 2.5 mg-0.5 mg/3 ml nebu 3 mL by Nebulization route every six (6) hours as needed. 50 mL 0       Past History     Past Medical History:  Past Medical History:   Diagnosis Date    Anemia 2012    Essential hypertension     HX OTHER MEDICAL     2009 baby put up for adoption, tested + for cocaine with that pregnancy    Hypertension        Past Surgical History:  Past Surgical History:   Procedure Laterality Date    HX CHOLECYSTECTOMY      HX GYN             Family History:  Family History   Problem Relation Age of Onset    Hypertension Father        Social History:  Social History     Tobacco Use    Smoking status: Current Every Day Smoker     Packs/day: 0.50    Smokeless tobacco: Never Used   Substance Use Topics    Alcohol use: Yes    Drug use: No       Allergies: Allergies   Allergen Reactions    Hydralazine Unknown (comments)     jitteriness    Hydromorphone Itching     morphine    Morphine Itching    Sulfa (Sulfonamide Antibiotics) Nausea and Vomiting       Review of Systems   Review of Systems   Constitutional: Negative for chills, fatigue and fever. HENT: Negative for congestion, ear pain and rhinorrhea. Eyes: Negative for pain and visual disturbance.    Respiratory: Negative for cough and shortness of breath. Cardiovascular: Positive for chest pain and palpitations. Negative for leg swelling. Gastrointestinal: Negative for abdominal pain, diarrhea, nausea and vomiting. Genitourinary: Negative for dysuria and flank pain. Musculoskeletal: Negative for back pain and neck pain. Skin: Negative for rash and wound. Neurological: Negative for dizziness, syncope and headaches. Psychiatric/Behavioral: Negative for self-injury and suicidal ideas. Physical Exam   Physical Exam  GENERAL:  Diaphoretic. Alert and oriented. Mild distress. EYES: PEERL, No injection, discharge or icterus. HENT: Mucous membranes pink and moist.  NECK: Supple  LUNGS: Airway patent. Mild respiratory distress. Breath sounds clear with good air entry bilaterally. HEART: Regular rhythm, tachycardic 150's. No peripheral edema  ABDOMEN: Non-distended and non-tender, without guarding or rebound. SKIN:  warm, dry  MSK/ EXTREMITIES: Without swelling, tenderness or deformity, symmetric with normal ROM  NEUROLOGICAL: Alert, oriented    Diagnostic Study Results     Labs -     Recent Results (from the past 12 hour(s))   EKG, 12 LEAD, INITIAL    Collection Time: 11/16/18  4:25 AM   Result Value Ref Range    Ventricular Rate 152 BPM    Atrial Rate 0 BPM    QRS Duration 166 ms    Q-T Interval 326 ms    QTC Calculation (Bezet) 518 ms    Calculated R Axis 0 degrees    Calculated T Axis 126 degrees    Diagnosis       Wide QRS tachycardia  Nonspecific intraventricular block  When compared with ECG of 01-DEC-2016 16:02,  Wide QRS tachycardia has replaced Sinus rhythm  Vent.  rate has increased BY  82 BPM     CBC WITH AUTOMATED DIFF    Collection Time: 11/16/18  4:59 AM   Result Value Ref Range    WBC 23.6 (H) 3.6 - 11.0 K/uL    RBC 4.50 3.80 - 5.20 M/uL    HGB 14.1 11.5 - 16.0 g/dL    HCT 41.1 35.0 - 47.0 %    MCV 91.3 80.0 - 99.0 FL    MCH 31.3 26.0 - 34.0 PG    MCHC 34.3 30.0 - 36.5 g/dL    RDW 13.6 11.5 - 14.5 % PLATELET 250 212 - 489 K/uL    MPV 11.4 8.9 - 12.9 FL    NRBC 0.0 0  WBC    ABSOLUTE NRBC 0.00 0.00 - 0.01 K/uL    NEUTROPHILS 83 (H) 32 - 75 %    LYMPHOCYTES 10 (L) 12 - 49 %    MONOCYTES 6 5 - 13 %    EOSINOPHILS 0 0 - 7 %    BASOPHILS 0 0 - 1 %    IMMATURE GRANULOCYTES 1 (H) 0.0 - 0.5 %    ABS. NEUTROPHILS 19.6 (H) 1.8 - 8.0 K/UL    ABS. LYMPHOCYTES 2.4 0.8 - 3.5 K/UL    ABS. MONOCYTES 1.3 (H) 0.0 - 1.0 K/UL    ABS. EOSINOPHILS 0.1 0.0 - 0.4 K/UL    ABS. BASOPHILS 0.1 0.0 - 0.1 K/UL    ABS. IMM. GRANS. 0.2 (H) 0.00 - 0.04 K/UL    DF AUTOMATED     METABOLIC PANEL, COMPREHENSIVE    Collection Time: 11/16/18  4:59 AM   Result Value Ref Range    Sodium 134 (L) 136 - 145 mmol/L    Potassium 2.8 (L) 3.5 - 5.1 mmol/L    Chloride 99 97 - 108 mmol/L    CO2 26 21 - 32 mmol/L    Anion gap 9 5 - 15 mmol/L    Glucose 142 (H) 65 - 100 mg/dL    BUN 11 6 - 20 MG/DL    Creatinine 1.15 (H) 0.55 - 1.02 MG/DL    BUN/Creatinine ratio 10 (L) 12 - 20      GFR est AA >60 >60 ml/min/1.73m2    GFR est non-AA 51 (L) >60 ml/min/1.73m2    Calcium 8.6 8.5 - 10.1 MG/DL    Bilirubin, total 0.6 0.2 - 1.0 MG/DL    ALT (SGPT) 35 12 - 78 U/L    AST (SGOT) 21 15 - 37 U/L    Alk.  phosphatase 80 45 - 117 U/L    Protein, total 8.3 (H) 6.4 - 8.2 g/dL    Albumin 4.0 3.5 - 5.0 g/dL    Globulin 4.3 (H) 2.0 - 4.0 g/dL    A-G Ratio 0.9 (L) 1.1 - 2.2     NT-PRO BNP    Collection Time: 11/16/18  4:59 AM   Result Value Ref Range    NT pro-BNP 3,353 (H) 0 - 125 PG/ML   TROPONIN I    Collection Time: 11/16/18  4:59 AM   Result Value Ref Range    Troponin-I, Qt. 0.05 (H) <0.05 ng/mL   URINALYSIS W/ RFLX MICROSCOPIC    Collection Time: 11/16/18  4:59 AM   Result Value Ref Range    Color YELLOW/STRAW      Appearance CLOUDY (A) CLEAR      Specific gravity 1.006 1.003 - 1.030      pH (UA) 5.5 5.0 - 8.0      Protein 30 (A) NEG mg/dL    Glucose NEGATIVE  NEG mg/dL    Ketone NEGATIVE  NEG mg/dL    Bilirubin NEGATIVE  NEG      Blood SMALL (A) NEG      Urobilinogen 0.2 0.2 - 1.0 EU/dL    Nitrites NEGATIVE  NEG      Leukocyte Esterase LARGE (A) NEG      WBC  0 - 4 /hpf    RBC 5-10 0 - 5 /hpf    Epithelial cells MANY (A) FEW /lpf    Bacteria NEGATIVE  NEG /hpf    Hyaline cast 0-2 0 - 5 /lpf   MAGNESIUM    Collection Time: 11/16/18  4:59 AM   Result Value Ref Range    Magnesium 1.8 1.6 - 2.4 mg/dL   HCG URINE, QL. - POC    Collection Time: 11/16/18  5:40 AM   Result Value Ref Range    Pregnancy test,urine (POC) NEGATIVE  NEG         Radiologic Studies -   CT Results  (Last 48 hours)               11/16/18 0604  CTA CHEST W OR W WO CONT Final result    Impression:  IMPRESSION:   No acute process. Variant vascular anatomy again evident, as above. Narrative:  INDICATION:  SOB, chest pain, new SVT, eval for PE        EXAM:  CTA Chest with contrast for Pulmonary Embolus       COMPARISON:  June 30, 2012       TECHNIQUE:  Following the uneventful intravenous administration of 100 cc Isovue   668, thin helical axial images were obtained through the chest. 3D image   postprocessing was performed. CT dose reduction was achieved through use of a   standardized protocol tailored for this examination and automatic exposure   control for dose modulation. FINDINGS:       THYROID: Unremarkable. MEDIASTINUM/DORIS: No mass or lymphadenopathy. Duplicated superior vena cava   also evident. HEART/PERICARDIUM: Unremarkable. PULMONARY ARTERIES:No pulmonary embolism. AORTA:  No aneurysm. No dissection. Complete vascular ring, congenital   variation, again evident. LUNGS/PLEURA: No pulmonary edema, pleural effusion or focal airspace disease. Mild bilateral upper lobe emphysema. INCIDENTALLY IMAGED UPPER ABDOMEN: No significant abnormality. BONES: No destructive bone lesion. ADDITIONAL COMMENTS:  N/A               Medical Decision Making   I am the first provider for this patient.     I reviewed the vital signs, available nursing notes, past medical history, past surgical history, family history and social history. Vital Signs-Reviewed the patient's vital signs. Patient Vitals for the past 12 hrs:   Temp Pulse Resp BP SpO2   11/16/18 0615 -- 96 22 (!) 174/93 95 %   11/16/18 0607 -- 93 -- -- --   11/16/18 0545 -- 94 23 (!) 165/108 98 %   11/16/18 0530 -- 93 21 152/82 96 %   11/16/18 0500 -- 97 20 (!) 164/113 95 %   11/16/18 0436 97.9 °F (36.6 °C) (!) 151 (!) 32 (!) 160/106 96 %       Pulse Oximetry Analysis - 96% on RA    Cardiac Monitor:   Rate: 151 bpm  Rhythm: Supraventricular Tachycardia (SVT)      EKG interpretation: (Preliminary)0429  Rhythm: Supraventricular tachycardia; and regular . Rate (approx.): 152; Axis: normal; HI interval: normal; QRS interval: normal ; ST/T wave: normal.  Written by Ning Duran. Pauline Patterson ED Scribe, as dictated by [employer]. MD Estefani    EKG interpretation: (Preliminary)0450 (After chemical conversion)  Rhythm: normal sinus rhythm with occasional PVC's; and regular . Rate (approx.): 99; Axis: normal; HI interval: normal; QRS interval: normal ; ST/T wave: prolonged QT, ST&T wave abnormality. Written by Ningflaquita Duran. Pauline Patterson ED Scribe, as dictated by [employer]. River Wright MD    Records Reviewed: Nursing Notes, Old Medical Records, Previous electrocardiograms, Ambulance Run Sheet, Previous Radiology Studies and Previous Laboratory Studies    Provider Notes (Medical Decision Making): On presentation the patient is well appearing, in moderate distress noted to be in PSVT on arrival with rates in the 150's. DDX includes ACS, PTX, PE, electrolyte abnormality, sympathomimetic abuse, medication non-compliance, hypertensive emergency. After arriving PSVT was identified on initial EKG which resolved after 6 mg IV adenosine. After cardioversion her chest pain and SOB began to resolve. Workup revealed several issues    1. PSVT:  Unknown etiology, CT chest neg for PE  2. Hypokalemia: repleted  3.  Elevated BNP:  May be rate related but feel she would benefit from an echo  4. Leukocytosis:  Likely 2/2 UTI, initiated treatment with ceftriaxone after cultures sent. ED Course:   Initial assessment performed. The patients presenting problems have been discussed, and they are in agreement with the care plan formulated and outlined with them. I have encouraged them to ask questions as they arise throughout their visit. Procedure Note - Chemical Cardioversion:   4:49 AM  Performed by [employer]. MD Estefani.  Cardioversion was indicated for a rhythm of supraventricular tachycardia and performed 1 times. 6 mg of adenosine were given to pt. Patient's rhythm was normal sinus rhythm at the end of the procedure. The procedure took 1-15 minutes, and pt tolerated well. CONSULT NOTE:   6:06 AM  Tia Led SARANYA Shipman MD spoke with dr. Mary Garcia  Specialty: Hospitalist  Discussed pt's hx, disposition, and available diagnostic and imaging results. Reviewed care plans. Consultant will evaluate pt for admission. Critical Care Time:   CRITICAL CARE NOTE :  4:49 AM    IMPENDING DETERIORATION -Cardiovascular  ASSOCIATED RISK FACTORS - Dysrhythmia  MANAGEMENT- Bedside Assessment and Supervision of Care  INTERPRETATION -  CT Scan, ECG and Blood Pressure  INTERVENTIONS - chemical cardioversion  CASE REVIEW - Hospitalist  TREATMENT RESPONSE -Improved and Stable  PERFORMED BY - Self    NOTES   :  I have spent 35 minutes of critical care time involved in lab review, consultations with specialist, family decision- making, bedside attention and documentation. During this entire length of time I was immediately available to the patient . Crystal Shipman MD    Disposition:  ADMIT NOTE:  The patient is being admitted to the hospital.  The results of their tests and reasons for their admission have been discussed with the patient and/or available family.   They convey agreement and understanding for the need to be admitted and for their admission diagnosis. PLAN:  1. Admit to medicine     Diagnosis     Clinical Impression:   1. Paroxysmal supraventricular tachycardia  2. Hypokalemia  3. Leukocytosis  4. Hypertension  5. UTI    Attestations: This note is prepared by Elizabeth Medrano. Nicola Camacho, acting as Scribe for [facility] Gerhardt Shipper, MD    [facility] Gerhardt Shipper, MD: The scribe's documentation has been prepared under my direction and personally reviewed by me in its entirety. I confirm that the note above accurately reflects all work, treatment, procedures, and medical decision making performed by me.

## 2018-11-16 NOTE — ED NOTES
Pt arrives via EMS c/o chest pain and rapid heart rate since 1 am today. Pt also c/o shortness of breath and cough x 1 week. Pt has hx of COPD and HTN and has not taken her meds in over 1 month. Monitor x 3. Call bell in reach and plan of care discussed.

## 2018-11-17 LAB
ANION GAP SERPL CALC-SCNC: 10 MMOL/L (ref 5–15)
ATRIAL RATE: 159 BPM
BACTERIA SPEC CULT: NORMAL
BASOPHILS # BLD: 0 K/UL (ref 0–0.1)
BASOPHILS NFR BLD: 0 % (ref 0–1)
BUN SERPL-MCNC: 20 MG/DL (ref 6–20)
BUN/CREAT SERPL: 14 (ref 12–20)
CALCIUM SERPL-MCNC: 9.4 MG/DL (ref 8.5–10.1)
CALCULATED P AXIS, ECG09: 7 DEGREES
CALCULATED R AXIS, ECG10: 8 DEGREES
CALCULATED T AXIS, ECG11: -154 DEGREES
CC UR VC: NORMAL
CHLORIDE SERPL-SCNC: 102 MMOL/L (ref 97–108)
CO2 SERPL-SCNC: 23 MMOL/L (ref 21–32)
CREAT SERPL-MCNC: 1.42 MG/DL (ref 0.55–1.02)
DIAGNOSIS, 93000: NORMAL
DIFFERENTIAL METHOD BLD: ABNORMAL
EOSINOPHIL # BLD: 0 K/UL (ref 0–0.4)
EOSINOPHIL NFR BLD: 0 % (ref 0–7)
ERYTHROCYTE [DISTWIDTH] IN BLOOD BY AUTOMATED COUNT: 13.3 % (ref 11.5–14.5)
GLUCOSE BLD STRIP.AUTO-MCNC: 182 MG/DL (ref 65–100)
GLUCOSE SERPL-MCNC: 234 MG/DL (ref 65–100)
HCT VFR BLD AUTO: 42.2 % (ref 35–47)
HGB BLD-MCNC: 14.3 G/DL (ref 11.5–16)
IMM GRANULOCYTES # BLD: 0.2 K/UL (ref 0–0.04)
IMM GRANULOCYTES NFR BLD AUTO: 1 % (ref 0–0.5)
LYMPHOCYTES # BLD: 1 K/UL (ref 0.8–3.5)
LYMPHOCYTES NFR BLD: 5 % (ref 12–49)
MCH RBC QN AUTO: 31 PG (ref 26–34)
MCHC RBC AUTO-ENTMCNC: 33.9 G/DL (ref 30–36.5)
MCV RBC AUTO: 91.3 FL (ref 80–99)
MONOCYTES # BLD: 0.4 K/UL (ref 0–1)
MONOCYTES NFR BLD: 2 % (ref 5–13)
NEUTS SEG # BLD: 19 K/UL (ref 1.8–8)
NEUTS SEG NFR BLD: 92 % (ref 32–75)
NRBC # BLD: 0 K/UL (ref 0–0.01)
NRBC BLD-RTO: 0 PER 100 WBC
P-R INTERVAL, ECG05: 170 MS
PLATELET # BLD AUTO: 351 K/UL (ref 150–400)
PMV BLD AUTO: 11.4 FL (ref 8.9–12.9)
POTASSIUM SERPL-SCNC: 3.1 MMOL/L (ref 3.5–5.1)
Q-T INTERVAL, ECG07: 308 MS
QRS DURATION, ECG06: 90 MS
QTC CALCULATION (BEZET), ECG08: 501 MS
RBC # BLD AUTO: 4.62 M/UL (ref 3.8–5.2)
RBC MORPH BLD: ABNORMAL
SERVICE CMNT-IMP: ABNORMAL
SERVICE CMNT-IMP: NORMAL
SODIUM SERPL-SCNC: 135 MMOL/L (ref 136–145)
TROPONIN I SERPL-MCNC: <0.05 NG/ML
TSH SERPL DL<=0.05 MIU/L-ACNC: 0.35 UIU/ML (ref 0.36–3.74)
VENTRICULAR RATE, ECG03: 159 BPM
WBC # BLD AUTO: 20.6 K/UL (ref 3.6–11)

## 2018-11-17 PROCEDURE — 74011250636 HC RX REV CODE- 250/636: Performed by: INTERNAL MEDICINE

## 2018-11-17 PROCEDURE — 85025 COMPLETE CBC W/AUTO DIFF WBC: CPT

## 2018-11-17 PROCEDURE — 84443 ASSAY THYROID STIM HORMONE: CPT

## 2018-11-17 PROCEDURE — 36415 COLL VENOUS BLD VENIPUNCTURE: CPT

## 2018-11-17 PROCEDURE — 90471 IMMUNIZATION ADMIN: CPT

## 2018-11-17 PROCEDURE — 90686 IIV4 VACC NO PRSV 0.5 ML IM: CPT | Performed by: EMERGENCY MEDICINE

## 2018-11-17 PROCEDURE — 93005 ELECTROCARDIOGRAM TRACING: CPT

## 2018-11-17 PROCEDURE — 93306 TTE W/DOPPLER COMPLETE: CPT

## 2018-11-17 PROCEDURE — 94760 N-INVAS EAR/PLS OXIMETRY 1: CPT

## 2018-11-17 PROCEDURE — 74011250637 HC RX REV CODE- 250/637: Performed by: INTERNAL MEDICINE

## 2018-11-17 PROCEDURE — 74011000258 HC RX REV CODE- 258: Performed by: EMERGENCY MEDICINE

## 2018-11-17 PROCEDURE — 80048 BASIC METABOLIC PNL TOTAL CA: CPT

## 2018-11-17 PROCEDURE — 65660000000 HC RM CCU STEPDOWN

## 2018-11-17 PROCEDURE — 74011250636 HC RX REV CODE- 250/636: Performed by: EMERGENCY MEDICINE

## 2018-11-17 PROCEDURE — 82962 GLUCOSE BLOOD TEST: CPT

## 2018-11-17 PROCEDURE — 74011250636 HC RX REV CODE- 250/636

## 2018-11-17 PROCEDURE — 84484 ASSAY OF TROPONIN QUANT: CPT

## 2018-11-17 RX ORDER — POTASSIUM CHLORIDE 750 MG/1
40 TABLET, FILM COATED, EXTENDED RELEASE ORAL ONCE
Status: COMPLETED | OUTPATIENT
Start: 2018-11-17 | End: 2018-11-17

## 2018-11-17 RX ORDER — ADENOSINE 3 MG/ML
INJECTION, SOLUTION INTRAVENOUS
Status: COMPLETED
Start: 2018-11-17 | End: 2018-11-17

## 2018-11-17 RX ORDER — LEVALBUTEROL INHALATION SOLUTION 1.25 MG/3ML
1.25 SOLUTION RESPIRATORY (INHALATION)
Status: DISCONTINUED | OUTPATIENT
Start: 2018-11-17 | End: 2018-11-19 | Stop reason: HOSPADM

## 2018-11-17 RX ORDER — DILTIAZEM HYDROCHLORIDE 120 MG/1
120 CAPSULE, COATED, EXTENDED RELEASE ORAL DAILY
Status: DISCONTINUED | OUTPATIENT
Start: 2018-11-17 | End: 2018-11-19

## 2018-11-17 RX ORDER — ALPRAZOLAM 0.5 MG/1
0.5 TABLET ORAL 3 TIMES DAILY
Status: DISCONTINUED | OUTPATIENT
Start: 2018-11-17 | End: 2018-11-19 | Stop reason: HOSPADM

## 2018-11-17 RX ADMIN — METHYLPREDNISOLONE SODIUM SUCCINATE 40 MG: 40 INJECTION, POWDER, FOR SOLUTION INTRAMUSCULAR; INTRAVENOUS at 06:22

## 2018-11-17 RX ADMIN — METHYLPREDNISOLONE SODIUM SUCCINATE 30 MG: 40 INJECTION, POWDER, FOR SOLUTION INTRAMUSCULAR; INTRAVENOUS at 21:38

## 2018-11-17 RX ADMIN — Medication 10 ML: at 06:22

## 2018-11-17 RX ADMIN — ALPRAZOLAM 0.5 MG: 0.5 TABLET ORAL at 04:29

## 2018-11-17 RX ADMIN — ROPINIROLE HYDROCHLORIDE 0.25 MG: 0.25 TABLET, FILM COATED ORAL at 08:57

## 2018-11-17 RX ADMIN — DILTIAZEM HYDROCHLORIDE 120 MG: 120 CAPSULE, COATED, EXTENDED RELEASE ORAL at 04:29

## 2018-11-17 RX ADMIN — POTASSIUM CHLORIDE 40 MEQ: 750 TABLET, EXTENDED RELEASE ORAL at 14:38

## 2018-11-17 RX ADMIN — Medication 10 ML: at 21:38

## 2018-11-17 RX ADMIN — HEPARIN SODIUM 5000 UNITS: 5000 INJECTION INTRAVENOUS; SUBCUTANEOUS at 08:57

## 2018-11-17 RX ADMIN — CHLORTHALIDONE 25 MG: 25 TABLET ORAL at 12:18

## 2018-11-17 RX ADMIN — HEPARIN SODIUM 5000 UNITS: 5000 INJECTION INTRAVENOUS; SUBCUTANEOUS at 17:43

## 2018-11-17 RX ADMIN — FLUTICASONE FUROATE AND VILANTEROL TRIFENATATE 1 PUFF: 100; 25 POWDER RESPIRATORY (INHALATION) at 12:18

## 2018-11-17 RX ADMIN — ALPRAZOLAM 0.5 MG: 0.5 TABLET ORAL at 08:57

## 2018-11-17 RX ADMIN — CEFTRIAXONE 1 G: 1 INJECTION, POWDER, FOR SOLUTION INTRAMUSCULAR; INTRAVENOUS at 08:56

## 2018-11-17 RX ADMIN — LOSARTAN POTASSIUM 100 MG: 100 TABLET, FILM COATED ORAL at 08:57

## 2018-11-17 RX ADMIN — INFLUENZA VIRUS VACCINE 0.5 ML: 15; 15; 15; 15 SUSPENSION INTRAMUSCULAR at 18:51

## 2018-11-17 RX ADMIN — HEPARIN SODIUM 5000 UNITS: 5000 INJECTION INTRAVENOUS; SUBCUTANEOUS at 00:39

## 2018-11-17 RX ADMIN — ALPRAZOLAM 0.5 MG: 0.5 TABLET ORAL at 21:38

## 2018-11-17 RX ADMIN — AZITHROMYCIN MONOHYDRATE 500 MG: 500 INJECTION, POWDER, LYOPHILIZED, FOR SOLUTION INTRAVENOUS at 09:49

## 2018-11-17 RX ADMIN — Medication 10 ML: at 14:38

## 2018-11-17 RX ADMIN — ONDANSETRON 4 MG: 2 INJECTION INTRAMUSCULAR; INTRAVENOUS at 10:07

## 2018-11-17 RX ADMIN — METHYLPREDNISOLONE SODIUM SUCCINATE 40 MG: 40 INJECTION, POWDER, FOR SOLUTION INTRAMUSCULAR; INTRAVENOUS at 00:39

## 2018-11-17 RX ADMIN — ADENOSINE 6 MG: 3 INJECTION, SOLUTION INTRAVENOUS at 03:07

## 2018-11-17 RX ADMIN — ALPRAZOLAM 0.5 MG: 0.5 TABLET ORAL at 17:43

## 2018-11-17 NOTE — PROGRESS NOTES
RRT was called because patient developed SVT, adenosine was given, patient back to sinus rhythm, patient feels anxious, Xanax will given, patient on Norvasc for high blood pressure, Norvasc will be discontinued and patient will be started on Cardizem p.o.

## 2018-11-17 NOTE — PROGRESS NOTES
Problem: Falls - Risk of  Goal: *Absence of Falls  Document Javier Fall Risk and appropriate interventions in the flowsheet.   Fall Risk Interventions:            Medication Interventions: Teach patient to arise slowly, Patient to call before getting OOB

## 2018-11-17 NOTE — PROGRESS NOTES
Responded to RRT call in room  at [phone] on 2018. This 57 y.o.  female,  Hasmukh Teran  is a patient of Rayna Mckeon MD.   Admitting diagnosis SVT (supraventricular tachycardia) ([facility] [address] Allergies   Allergen Reactions    Hydralazine Unknown (comments)     jitteriness    Hydromorphone Itching     morphine    Morphine Itching    Sulfa (Sulfonamide Antibiotics) Nausea and Vomiting        Reason for call:  She is oriented to person, place, and time, anxious, ill-appearing and presents in SVT . EKG performed prior to my arrival.  Pt reports midsternal chest pain that is the same pain that she experienced yesterday when she had her first episode of SVT. Pt attached to  lifepack and multi function pads. Primary RN pulled adenosine from \[facility]\"" upon my request awaiting MD's arrival to evaluate. Attempted vagal manuvers without any success while we waited for MD. Pt slightly SOB NC 2 L placed, O2 sat 95%. 's/100's. Will notify MD of findings. 2315- pt given adenosine 6 mg and HR slowed to 110's. Pt reports pain in chest improved and is breathing more easily. MD to change orders for norvasc to cardizem and albuterol change to xopenex. Pt also requested medication to help rest. Xanax frequency increased. Blood pressure (!) 147/114, pulse (!) 157, temperature 97.8 °F (36.6 °C), resp. rate 24, height 5' 7\" (1.702 m), weight 82.6 kg (182 lb 3.2 oz), last menstrual period 10/16/2018, SpO2 94 %, not currently breastfeeding. MD Notified: Regina Morfin    Orders were: adenosine. Cardizem.  xopenex and xanax    Past Medical History:   Diagnosis Date    Anemia 2012    Essential hypertension     HX OTHER MEDICAL     2009 baby put up for adoption, tested + for cocaine with that pregnancy    Hypertension     SVT (supraventricular tachycardia) ([facility] [address] 2018          Past Surgical History:   Procedure Laterality Date    HX CHOLECYSTECTOMY      HX GYN             Outcome: pt stayed in room    Time Ended: Lavern Farias, RN RRT Nurse

## 2018-11-17 NOTE — PROGRESS NOTES
0245: Pt's . HR was 90's previously. Pt c/o CP and palpitations similar to how she felt when she came into hospital. EKG done. . /118. Rapid response CP called. Pt appears to be in a moderate amount of distress. Pt placed on O2 2L nc, O2 sats 95%. RRT at bedside. Adenosine given by CCU RN. Dr. Law Owens to adjust some medication orders. Pt's HR down to 105-110. New orders received. Pt resting more comfortably, stating she is feeling better. Pt removed O2 NC. O2 sats 94%. Pt feels restless and wants to walk in hallway, RN advised pt to take it easy and rest.     Will continue to monitor.

## 2018-11-17 NOTE — PROGRESS NOTES
TRANSFER - OUT REPORT:    Verbal report given to Zuleyma(name) on Shadia Watts  being transferred to [facility](unit) for routine progression of care       Report consisted of patients Situation, Background, Assessment and   Recommendations(SBAR). Information from the following report(s) SBAR, Kardex, ED Summary and Recent Results was reviewed with the receiving nurse. Lines:   Peripheral IV 11/16/18 Left Antecubital (Active)   Site Assessment Clean, dry, & intact 11/16/2018  3:25 PM   Phlebitis Assessment 0 11/16/2018  3:25 PM   Infiltration Assessment 0 11/16/2018  3:25 PM   Dressing Status Clean, dry, & intact 11/16/2018  3:25 PM   Dressing Type Transparent 11/16/2018  3:25 PM   Hub Color/Line Status Pink;Flushed 11/16/2018  3:25 PM   Action Taken Blood drawn 11/16/2018  4:42 AM        Opportunity for questions and clarification was provided.

## 2018-11-17 NOTE — PROGRESS NOTES
Hospitalist Progress Note    NAME: Sergio Pillai   :  1970   MRN:  [id number]       Assessment / Plan:  Supraventricular tachycardia  Required adenosine twice  We will check troponins x2, < 0.05, replace potassium  Cardiology Consult is appreciated  Check echo-pending  Hemodynamically stable  Will need 30 day event monitor and f/u with Dr. Dairl Gilford as OP    Shortness of breath cough and phlegm most likely due to COPD exacerbation  CT of chest showed no infiltrate   Solu-Medrol, DuoNeb and azithromycin   Mucinex  Nicotine patch  Counseled regarding smoking cessation     Leucocytosis may be reactive, will rule out infectious cause  Afebrile, CTA chest shows no infiltrate  UA is abnormal but not a clean catch, urine cx negative  Check cbc in am   Blood cx is negative so far     Hypokalemia  replace    Hypertension uncontrolled  Losartan, chlorthalidone   Cardizem increased    Generalized anxiety disorder  Resume home Xanax    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 Discussed with RN events overnight. Review of Systems:  Symptom Y/N Comments  Symptom Y/N Comments   Fever/Chills    Chest Pain     Poor Appetite    Edema     Cough    Abdominal Pain     Sputum    Joint Pain     SOB/ZAPATA    Pruritis/Rash     Nausea/vomit    Tolerating PT/OT     Diarrhea    Tolerating Diet     Constipation    Other       Could NOT obtain due to:      Objective:     VITALS:   Last 24hrs VS reviewed since prior progress note.  Most recent are:  Patient Vitals for the past 24 hrs:   Temp Pulse Resp BP SpO2   18 0827 97.3 °F (36.3 °C) 97 18 (!) 164/93 94 %   18 0517 -- (!) 109 20 -- 94 %   18 0308 -- (!) 157 28 (!) 147/114 94 %   11/17/18 0307 -- (!) 159 -- -- --   11/17/18 0242 97.8 °F (36.6 °C) (!) 160 24 (!) 157/118 93 %   11/17/18 0002 97.9 °F (36.6 °C) (!) 106 20 159/89 96 %   11/16/18 2017 97.7 °F (36.5 °C) 99 20 146/87 96 %   11/16/18 1655 97.5 °F (36.4 °C) 95 20 151/89 94 %   11/16/18 1408 97.9 °F (36.6 °C) 96 19 146/90 94 %       Intake/Output Summary (Last 24 hours) at 11/17/2018 1246  Last data filed at 11/16/2018 1525  Gross per 24 hour   Intake --   Output 500 ml   Net -500 ml        PHYSICAL EXAM:  General: WD, WN. Alert, cooperative, no acute distress    EENT:  EOMI. Anicteric sclerae. MMM  Resp:  CTA bilaterally, no wheezing or rales. No accessory muscle use  CV:  Regular  rhythm,  No edema  GI:  Soft, Non distended, Non tender.  +Bowel sounds  Neurologic:  Alert and oriented X 3, normal speech, grossly intact  Psych:   Good insight. Not anxious nor agitated  Skin:  No rashes. No jaundice    Reviewed most current lab test results and cultures  YES  Reviewed most current radiology test results   YES  Review and summation of old records today    NO  Reviewed patient's current orders and MAR    YES  PMH/SH reviewed - no change compared to H&P  ________________________________________________________________________  Care Plan discussed with:    Comments   Patient x    Family      RN x    Care Manager     Consultant                        Multidiciplinary team rounds were held today with , nursing, pharmacist and clinical coordinator. Patient's plan of care was discussed; medications were reviewed and discharge planning was addressed.      ________________________________________________________________________  Total NON critical care TIME:  25   Minutes    Total CRITICAL CARE TIME Spent:   Minutes non procedure based      Comments   >50% of visit spent in counseling and coordination of care     ________________________________________________________________________  [place] Pratima MD Procedures: see electronic medical records for all procedures/Xrays and details which were not copied into this note but were reviewed prior to creation of Plan. LABS:  I reviewed today's most current labs and imaging studies.   Pertinent labs include:  Recent Labs     11/17/18 0336 11/16/18 0459   WBC 20.6* 23.6*   HGB 14.3 14.1   HCT 42.2 41.1    390     Recent Labs     11/17/18 0336 11/16/18 0459   * 134*   K 3.1* 2.8*    99   CO2 23 26   * 142*   BUN 20 11   CREA 1.42* 1.15*   CA 9.4 8.6   MG  --  1.8   ALB  --  4.0   TBILI  --  0.6   SGOT  --  21   ALT  --  35       Signed: Abebe Dubose MD

## 2018-11-17 NOTE — PROGRESS NOTES
TRANSFER - IN REPORT:  Verbal report received from Selam RN(name) on Ela Gao  being received from ED(unit) for routine progression of care    Report consisted of patients Situation, Background, Assessment and   Recommendations(SBAR). Information from the following report(s) SBAR, Kardex, ED Summary, Procedure Summary, Intake/Output, MAR and Recent Results was reviewed with the receiving nurse. Opportunity for questions and clarification was provided. Assessment completed upon patients arrival to unit and care assumed. Primary Nurse Patrick Izaguirre RN and Kerry Santamaria RN performed a dual skin assessment on this patient No impairment noted  Luis Fernando score is 23.

## 2018-11-17 NOTE — PROGRESS NOTES
Progress Note      11/17/2018 11:17 AM  NAME: Lucía Spear   MRN:  [id number]   Admit Diagnosis: SVT (supraventricular tachycardia) ([facility])      Problem List:     1. Paroxysmal supraventricular tachycardia; adenosine-sensitive  2. Hypokalemia  3. Leukocytosis  4. Hypertension  5. COPD  6. Active cocaine use (intranasal)  7. Tobacco abuse     Assessment/Plan:   EKG:  SR, PVC, lateral TW changes when compared to 12/16  Echo normal in 2/12     Given adenosine in the ER. SVT again last night w/ response noted to adenosine. CTA w/o PE     1. PSVT resolved -- now in NSR  2. She needs aggressive K repletion in addition to magnesium sulfate   3. Goal K > 4 and Mg > 2  4. Echo ordered by primary; will review when done and make any additional recommendations as needed  5. Last cocaine used reported to be last week  6. Ideally she would be on a beta blocker but this is contraindicated with active cocaine use  7. Ok w/ change from norvasc to cardizem; increase to 240mg in the AM if BP still uncontrolled  8. Continue with chlorthalidone 25mg and losartan 100mg  9. Will likely need a 30 day event monitor as an outpt; she never followed up last time  10. Will need to see Dr. Kathy Vargas in 4-6 weeks; she never followed up last time  6. She should probably stop doing cocaine         []       High complexity decision making was performed in this patient at high risk for decompensation with multiple organ involvement. Subjective:     Lucía Spear denies chest pain; still w/ dyspnea  Discussed with RN events overnight.      Review of Systems:    Symptom Y/N Comments  Symptom Y/N Comments   Fever/Chills N   Chest Pain N    Poor Appetite N   Edema N    Cough N   Abdominal Pain N    Sputum N   Joint Pain N    SOB/ZAPATA N   Pruritis/Rash N    Nausea/vomit N   Tolerating PT/OT Y    Diarrhea N   Tolerating Diet Y    Constipation N   Other       Could NOT obtain due to:      Objective:      Physical Exam:    Last 24hrs VS reviewed since prior progress note. Most recent are:    Visit Vitals  BP (!) 164/93 (BP 1 Location: Left arm, BP Patient Position: At rest)   Pulse 97   Temp 97.3 °F (36.3 °C)   Resp 18   Ht 5' 7\" (1.702 m)   Wt 82.6 kg (182 lb 3.2 oz)   LMP 10/16/2018   SpO2 94%   Breastfeeding? No   BMI 28.54 kg/m²       Intake/Output Summary (Last 24 hours) at 11/17/2018 1117  Last data filed at 11/16/2018 1525  Gross per 24 hour   Intake --   Output 500 ml   Net -500 ml        General Appearance: Well developed, well nourished, alert & oriented x 3,    no acute distress. Ears/Nose/Mouth/Throat: Hearing grossly normal.  Neck: Supple. Chest: Lungs clear to auscultation bilaterally. Cardiovascular: Regular rate and rhythm, S1S2 normal, no murmur. Abdomen: Soft, non-tender, bowel sounds are active. Extremities: No edema bilaterally. Skin: Warm and dry. []         Post-cath site without hematoma, bruit, tenderness, or thrill. Distal pulses intact. PMH/SH reviewed - no change compared to H&P    Data Review    Telemetry: sinus rhythm     EKG: SVT (AVNRT?); ST dep diffusely  []  No new EKG for review    Lab Data Personally Reviewed:    Recent Labs     11/17/18 0336 11/16/18 0459   WBC 20.6* 23.6*   HGB 14.3 14.1   HCT 42.2 41.1    390     No results for input(s): INR, PTP, APTT in the last 72 hours.     No lab exists for component: INREXT   Recent Labs     11/17/18 0336 11/16/18 0459   * 134*   K 3.1* 2.8*    99   CO2 23 26   BUN 20 11   CREA 1.42* 1.15*   * 142*   CA 9.4 8.6   MG  --  1.8     Recent Labs     11/17/18  0336 11/16/18  1311 11/16/18  0459   TROIQ <0.05 <0.05 0.05*     Lab Results   Component Value Date/Time    Cholesterol, total 112 01/31/2011 08:00 PM    HDL Cholesterol 37 01/31/2011 08:00 PM    LDL, calculated 33.2 01/31/2011 08:00 PM    Triglyceride 209 (H) 01/31/2011 08:00 PM    CHOL/HDL Ratio 3.0 01/31/2011 08:00 PM       Recent Labs     11/16/18  0459   SGOT 21   AP 80   TP 8.3*   ALB 4.0   GLOB 4.3*     No results for input(s): PH, PCO2, PO2 in the last 72 hours.     Medications Personally Reviewed:    Current Facility-Administered Medications   Medication Dose Route Frequency    levalbuterol (XOPENEX) nebulizer soln 1.25 mg/3 mL  1.25 mg Nebulization Q4H PRN    methylPREDNISolone (PF) (SOLU-MEDROL) injection 40 mg  40 mg IntraVENous Q8H    ALPRAZolam (XANAX) tablet 0.5 mg  0.5 mg Oral TID    dilTIAZem CD (CARDIZEM CD) capsule 120 mg  120 mg Oral DAILY    fluticasone-vilanterol (BREO ELLIPTA) 100mcg-25mcg/puff  1 Puff Inhalation DAILY    losartan (COZAAR) tablet 100 mg  100 mg Oral DAILY    chlorthalidone (HYGROTEN) tablet 25 mg  25 mg Oral DAILY    sodium chloride (NS) flush 5-10 mL  5-10 mL IntraVENous Q8H    sodium chloride (NS) flush 5-10 mL  5-10 mL IntraVENous PRN    ondansetron (ZOFRAN) injection 4 mg  4 mg IntraVENous Q6H PRN    docusate sodium (COLACE) capsule 100 mg  100 mg Oral DAILY PRN    heparin (porcine) injection 5,000 Units  5,000 Units SubCUTAneous Q8H    acetaminophen (TYLENOL) solution 650 mg  650 mg Oral Q4H PRN    nicotine (NICODERM CQ) 21 mg/24 hr patch 1 Patch  1 Patch TransDERmal Q24H    azithromycin (ZITHROMAX) 500 mg in 0.9% sodium chloride 250 mL ([id number])  500 mg IntraVENous Q24H    cefTRIAXone (ROCEPHIN) 1 g in 0.9% sodium chloride (MBP/ADV) 50 mL  1 g IntraVENous Q24H    rOPINIRole (REQUIP) tablet 0.25 mg  0.25 mg Oral DAILY    influenza vaccine 2018-19 (6 mos+)(PF) (FLUARIX QUAD/FLULAVAL QUAD) injection 0.5 mL  0.5 mL IntraMUSCular PRIOR TO DISCHARGE         Ann Cuenca III, DO

## 2018-11-18 LAB
ANION GAP SERPL CALC-SCNC: 7 MMOL/L (ref 5–15)
BASOPHILS # BLD: 0 K/UL (ref 0–0.1)
BASOPHILS NFR BLD: 0 % (ref 0–1)
BUN SERPL-MCNC: 28 MG/DL (ref 6–20)
BUN/CREAT SERPL: 20 (ref 12–20)
CALCIUM SERPL-MCNC: 9.6 MG/DL (ref 8.5–10.1)
CHLORIDE SERPL-SCNC: 103 MMOL/L (ref 97–108)
CO2 SERPL-SCNC: 29 MMOL/L (ref 21–32)
CREAT SERPL-MCNC: 1.43 MG/DL (ref 0.55–1.02)
DIFFERENTIAL METHOD BLD: ABNORMAL
EOSINOPHIL # BLD: 0 K/UL (ref 0–0.4)
EOSINOPHIL NFR BLD: 0 % (ref 0–7)
ERYTHROCYTE [DISTWIDTH] IN BLOOD BY AUTOMATED COUNT: 13.7 % (ref 11.5–14.5)
GLUCOSE BLD STRIP.AUTO-MCNC: 212 MG/DL (ref 65–100)
GLUCOSE SERPL-MCNC: 162 MG/DL (ref 65–100)
HCT VFR BLD AUTO: 40.2 % (ref 35–47)
HGB BLD-MCNC: 13.2 G/DL (ref 11.5–16)
IMM GRANULOCYTES # BLD: 0.2 K/UL (ref 0–0.04)
IMM GRANULOCYTES NFR BLD AUTO: 1 % (ref 0–0.5)
LYMPHOCYTES # BLD: 1.3 K/UL (ref 0.8–3.5)
LYMPHOCYTES NFR BLD: 5 % (ref 12–49)
MAGNESIUM SERPL-MCNC: 2.1 MG/DL (ref 1.6–2.4)
MCH RBC QN AUTO: 30.8 PG (ref 26–34)
MCHC RBC AUTO-ENTMCNC: 32.8 G/DL (ref 30–36.5)
MCV RBC AUTO: 93.9 FL (ref 80–99)
MONOCYTES # BLD: 0.8 K/UL (ref 0–1)
MONOCYTES NFR BLD: 3 % (ref 5–13)
NEUTS SEG # BLD: 22.6 K/UL (ref 1.8–8)
NEUTS SEG NFR BLD: 90 % (ref 32–75)
NRBC # BLD: 0 K/UL (ref 0–0.01)
NRBC BLD-RTO: 0 PER 100 WBC
PHOSPHATE SERPL-MCNC: 3.1 MG/DL (ref 2.6–4.7)
PLATELET # BLD AUTO: 365 K/UL (ref 150–400)
PMV BLD AUTO: 11.5 FL (ref 8.9–12.9)
POTASSIUM SERPL-SCNC: 3.8 MMOL/L (ref 3.5–5.1)
RBC # BLD AUTO: 4.28 M/UL (ref 3.8–5.2)
SERVICE CMNT-IMP: ABNORMAL
SODIUM SERPL-SCNC: 139 MMOL/L (ref 136–145)
WBC # BLD AUTO: 25 K/UL (ref 3.6–11)

## 2018-11-18 PROCEDURE — 84100 ASSAY OF PHOSPHORUS: CPT

## 2018-11-18 PROCEDURE — 36415 COLL VENOUS BLD VENIPUNCTURE: CPT

## 2018-11-18 PROCEDURE — 74011000258 HC RX REV CODE- 258: Performed by: EMERGENCY MEDICINE

## 2018-11-18 PROCEDURE — 74011250636 HC RX REV CODE- 250/636: Performed by: EMERGENCY MEDICINE

## 2018-11-18 PROCEDURE — 94760 N-INVAS EAR/PLS OXIMETRY 1: CPT

## 2018-11-18 PROCEDURE — 82962 GLUCOSE BLOOD TEST: CPT

## 2018-11-18 PROCEDURE — 85025 COMPLETE CBC W/AUTO DIFF WBC: CPT

## 2018-11-18 PROCEDURE — 74011250636 HC RX REV CODE- 250/636: Performed by: INTERNAL MEDICINE

## 2018-11-18 PROCEDURE — 80048 BASIC METABOLIC PNL TOTAL CA: CPT

## 2018-11-18 PROCEDURE — 83735 ASSAY OF MAGNESIUM: CPT

## 2018-11-18 PROCEDURE — 74011250637 HC RX REV CODE- 250/637: Performed by: INTERNAL MEDICINE

## 2018-11-18 PROCEDURE — 65660000000 HC RM CCU STEPDOWN

## 2018-11-18 RX ADMIN — ROPINIROLE HYDROCHLORIDE 0.25 MG: 0.25 TABLET, FILM COATED ORAL at 09:37

## 2018-11-18 RX ADMIN — FLUTICASONE FUROATE AND VILANTEROL TRIFENATATE 1 PUFF: 100; 25 POWDER RESPIRATORY (INHALATION) at 09:39

## 2018-11-18 RX ADMIN — ALPRAZOLAM 0.5 MG: 0.5 TABLET ORAL at 22:04

## 2018-11-18 RX ADMIN — DILTIAZEM HYDROCHLORIDE 120 MG: 120 CAPSULE, COATED, EXTENDED RELEASE ORAL at 09:37

## 2018-11-18 RX ADMIN — ALPRAZOLAM 0.5 MG: 0.5 TABLET ORAL at 09:37

## 2018-11-18 RX ADMIN — ALPRAZOLAM 0.5 MG: 0.5 TABLET ORAL at 17:29

## 2018-11-18 RX ADMIN — Medication 10 ML: at 14:00

## 2018-11-18 RX ADMIN — Medication 10 ML: at 22:05

## 2018-11-18 RX ADMIN — CEFTRIAXONE 1 G: 1 INJECTION, POWDER, FOR SOLUTION INTRAMUSCULAR; INTRAVENOUS at 08:39

## 2018-11-18 RX ADMIN — HEPARIN SODIUM 5000 UNITS: 5000 INJECTION INTRAVENOUS; SUBCUTANEOUS at 02:33

## 2018-11-18 RX ADMIN — HEPARIN SODIUM 5000 UNITS: 5000 INJECTION INTRAVENOUS; SUBCUTANEOUS at 09:38

## 2018-11-18 RX ADMIN — METHYLPREDNISOLONE SODIUM SUCCINATE 30 MG: 40 INJECTION, POWDER, FOR SOLUTION INTRAMUSCULAR; INTRAVENOUS at 09:39

## 2018-11-18 RX ADMIN — AZITHROMYCIN MONOHYDRATE 500 MG: 500 INJECTION, POWDER, LYOPHILIZED, FOR SOLUTION INTRAVENOUS at 09:36

## 2018-11-18 RX ADMIN — HEPARIN SODIUM 5000 UNITS: 5000 INJECTION INTRAVENOUS; SUBCUTANEOUS at 17:28

## 2018-11-18 RX ADMIN — CHLORTHALIDONE 25 MG: 25 TABLET ORAL at 09:38

## 2018-11-18 RX ADMIN — LOSARTAN POTASSIUM 100 MG: 100 TABLET, FILM COATED ORAL at 09:38

## 2018-11-18 NOTE — PROGRESS NOTES
Progress Note      11/18/2018 11:17 AM  NAME: Young Pump   MRN:  [id number]   Admit Diagnosis: SVT (supraventricular tachycardia) ([facility])      Problem List:     1. Paroxysmal supraventricular tachycardia; adenosine-sensitive  2. Hypokalemia; resolved  3. Leukocytosis  4. Hypertension  5. COPD  6. Active cocaine use (intranasal)  7. Tobacco abuse     Assessment/Plan:   EKG:  SR, PVC, lateral TW changes when compared to 12/16  Echo normal in 2/12     Given adenosine in the ER. SVT again 11/17 w/ response noted to adenosine. CTA w/o PE     1. PSVT resolved -- now in NSR  2. Goal K > 4 and Mg > 2  3. Last cocaine used reported to be last week  4. Ideally she would be on a beta blocker but this is contraindicated with active cocaine use  5. Continue cardizem; increase to 240mg if BP still uncontrolled  6. Continue with chlorthalidone 25mg and losartan 100mg  7. Will likely need a 30 day event monitor as an outpt; she never followed up last time  8. Will need to see Dr. Kodak Berman in 4-6 weeks; she never followed up last time  9. She should probably stop doing cocaine  10. Will be available as needed         []       High complexity decision making was performed in this patient at high risk for decompensation with multiple organ involvement. Subjective:     Young Pump denies chest pain; still w/ dyspnea but better, now walking the halls  Discussed with RN events overnight. Review of Systems:    Symptom Y/N Comments  Symptom Y/N Comments   Fever/Chills N   Chest Pain N    Poor Appetite N   Edema N    Cough N   Abdominal Pain N    Sputum N   Joint Pain N    SOB/ZAPATA N   Pruritis/Rash N    Nausea/vomit N   Tolerating PT/OT Y    Diarrhea N   Tolerating Diet Y    Constipation N   Other       Could NOT obtain due to:      Objective:      Physical Exam:    Last 24hrs VS reviewed since prior progress note.  Most recent are:    Visit Vitals  /85 (BP 1 Location: Left arm, BP Patient Position: Lying right side)   Pulse 91   Temp 97.5 °F (36.4 °C)   Resp 23   Ht 5' 7\" (1.702 m)   Wt 82.6 kg (182 lb 3.2 oz)   LMP 10/16/2018   SpO2 97%   Breastfeeding? No   BMI 28.54 kg/m²     No intake or output data in the 24 hours ending 11/18/18 0940     General Appearance: Well developed, well nourished, alert & oriented x 3,    no acute distress. Ears/Nose/Mouth/Throat: Hearing grossly normal.  Neck: Supple. Chest: Lungs clear to auscultation bilaterally. Cardiovascular: Regular rate and rhythm, S1S2 normal, no murmur. Abdomen: Soft, non-tender, bowel sounds are active. Extremities: No edema bilaterally. Skin: Warm and dry. []         Post-cath site without hematoma, bruit, tenderness, or thrill. Distal pulses intact. PMH/SH reviewed - no change compared to H&P    Data Review    Telemetry: sinus rhythm     EKG: SVT (AVNRT?); ST dep diffusely  []  No new EKG for review    Lab Data Personally Reviewed:    Recent Labs     11/18/18 0238 11/17/18 0336   WBC 25.0* 20.6*   HGB 13.2 14.3   HCT 40.2 42.2    351     No results for input(s): INR, PTP, APTT in the last 72 hours. No lab exists for component: Alfie Victor   Recent Labs     11/18/18  0238 11/17/18  0336 11/16/18  0459    135* 134*   K 3.8 3.1* 2.8*    102 99   CO2 29 23 26   BUN 28* 20 11   CREA 1.43* 1.42* 1.15*   * 234* 142*   CA 9.6 9.4 8.6   MG 2.1  --  1.8     Recent Labs     11/17/18  0336 11/16/18  1311 11/16/18  0459   TROIQ <0.05 <0.05 0.05*     Lab Results   Component Value Date/Time    Cholesterol, total 112 01/31/2011 08:00 PM    HDL Cholesterol 37 01/31/2011 08:00 PM    LDL, calculated 33.2 01/31/2011 08:00 PM    Triglyceride 209 (H) 01/31/2011 08:00 PM    CHOL/HDL Ratio 3.0 01/31/2011 08:00 PM       Recent Labs     11/16/18  0459   SGOT 21   AP 80   TP 8.3*   ALB 4.0   GLOB 4.3*     No results for input(s): PH, PCO2, PO2 in the last 72 hours.     Medications Personally Reviewed:    Current Facility-Administered Medications   Medication Dose Route Frequency    levalbuterol (XOPENEX) nebulizer soln 1.25 mg/3 mL  1.25 mg Nebulization Q4H PRN    ALPRAZolam (XANAX) tablet 0.5 mg  0.5 mg Oral TID    dilTIAZem CD (CARDIZEM CD) capsule 120 mg  120 mg Oral DAILY    methylPREDNISolone (PF) (SOLU-MEDROL) injection 30 mg  30 mg IntraVENous Q12H    fluticasone-vilanterol (BREO ELLIPTA) 100mcg-25mcg/puff  1 Puff Inhalation DAILY    losartan (COZAAR) tablet 100 mg  100 mg Oral DAILY    chlorthalidone (HYGROTEN) tablet 25 mg  25 mg Oral DAILY    sodium chloride (NS) flush 5-10 mL  5-10 mL IntraVENous Q8H    sodium chloride (NS) flush 5-10 mL  5-10 mL IntraVENous PRN    ondansetron (ZOFRAN) injection 4 mg  4 mg IntraVENous Q6H PRN    docusate sodium (COLACE) capsule 100 mg  100 mg Oral DAILY PRN    heparin (porcine) injection 5,000 Units  5,000 Units SubCUTAneous Q8H    acetaminophen (TYLENOL) solution 650 mg  650 mg Oral Q4H PRN    nicotine (NICODERM CQ) 21 mg/24 hr patch 1 Patch  1 Patch TransDERmal Q24H    azithromycin (ZITHROMAX) 500 mg in 0.9% sodium chloride 250 mL ([id number])  500 mg IntraVENous Q24H    cefTRIAXone (ROCEPHIN) 1 g in 0.9% sodium chloride (MBP/ADV) 50 mL  1 g IntraVENous Q24H    rOPINIRole (REQUIP) tablet 0.25 mg  0.25 mg Oral DAILY         Devonda Harada III, DO

## 2018-11-18 NOTE — PROGRESS NOTES
Antimicrobial Stewardship    Indication:  UTI, empiric    Cultures:   PBCx - ng pending   UCx - no significant growth - final  Estimated Creatinine Clearance: 54.3 mL/min (A) (based on SCr of 1.43 mg/dL (H)).   Estimated Creatinine Clearance (using IBW):47.8 mL/min    Recent Labs     18  0238 18  0341 18  0336 18  0459   CREA 1.43*  --  1.42* 1.15*   BUN 28*  --  20 11   WBC 25.0*  --  20.6* 23.6*     --  135* 134*   K 3.8  --  3.1* 2.8*   MG 2.1  --   --  1.8   CA 9.6  --  9.4 8.6   PHOS 3.1  --   --   --    ALB  --   --   --  4.0   HGB 13.2  --  14.3 14.1   HCT 40.2  --  42.2 41.1     --  351 390   ALT  --   --   --  35   TSH  --  0.35*  --   --      Temp (24hrs), Av.7 °F (36.5 °C), Min:97.3 °F (36.3 °C), Max:98.8 °F (37.1 °C)    Possible contributing factors for WBC elevation:  steroids  Hicks:  none  Regimens:    Azithromycin 500mg IV q24h - started 11/16 x 5 days  Ceftriaxone 1gm IV q24h - started  day 3    Recommendation:  Place 3 day stop date on Ceftriaxone

## 2018-11-18 NOTE — PROGRESS NOTES
Hospitalist Progress Note    NAME: Alessandra Dan   :  1970   MRN:  [id number]       Assessment / Plan:  Supraventricular tachycardia  Required adenosine twice   troponins x2, < 0.05, replace potassium  Cardiology Consult is appreciated  Echo-EF 70 % to 75 %. No obvious wall motion abnormalities   Hemodynamically stable  Will need 30 day event monitor and f/u with Dr. 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 Discussed with RN events overnight. Review of Systems:  Symptom Y/N Comments  Symptom Y/N Comments   Fever/Chills n   Chest Pain n    Poor Appetite    Edema     Cough n   Abdominal Pain n    Sputum    Joint Pain     SOB/ZAPATA n   Pruritis/Rash     Nausea/vomit n   Tolerating PT/OT     Diarrhea    Tolerating Diet     Constipation    Other       Could NOT obtain due to:      Objective:     VITALS:   Last 24hrs VS reviewed since prior progress note.  Most recent are:  Patient Vitals for the past 24 hrs:   Temp Pulse Resp BP SpO2   18 0756 97.5 °F (36.4 °C) 91 23 142/85 97 %   18 0359 97.3 °F (36.3 °C) 86 22 (!) 157/96 93 %   11/17/18 2244 98.8 °F (37.1 °C) 91 22 134/65 94 %   11/17/18 1952 97.5 °F (36.4 °C) 88 24 (!) 154/99 96 %   11/17/18 1747 97.5 °F (36.4 °C) 93 28 156/90 95 %   11/17/18 1251 97.7 °F (36.5 °C) 92 24 (!) 155/97 91 %     No intake or output data in the 24 hours ending 11/18/18 0942     PHYSICAL EXAM:  General: WD, WN. Alert, cooperative, no acute distress    EENT:  EOMI. Anicteric sclerae. MMM  Resp:  CTA bilaterally, no wheezing or rales. No accessory muscle use  CV:  Regular  rhythm,  No edema  GI:  Soft, Non distended, Non tender.  +Bowel sounds  Neurologic:  Alert and oriented X 3, normal speech, grossly intact  Psych:   Good insight. Not anxious nor agitated  Skin:  No rashes. No jaundice    Reviewed most current lab test results and cultures  YES  Reviewed most current radiology test results   YES  Review and summation of old records today    NO  Reviewed patient's current orders and MAR    YES  PMH/ reviewed - no change compared to H&P  ________________________________________________________________________  Care Plan discussed with:    Comments   Patient x    Family      RN x    Care Manager     Consultant                        Multidiciplinary team rounds were held today with , nursing, pharmacist and clinical coordinator. Patient's plan of care was discussed; medications were reviewed and discharge planning was addressed. ________________________________________________________________________  Total NON critical care TIME:  25   Minutes    Total CRITICAL CARE TIME Spent:   Minutes non procedure based      Comments   >50% of visit spent in counseling and coordination of care     ________________________________________________________________________  Sarahi Willams MD     Procedures: see electronic medical records for all procedures/Xrays and details which were not copied into this note but were reviewed prior to creation of Plan.       LABS:  I reviewed today's most current labs and imaging studies.   Pertinent labs include:  Recent Labs     11/18/18 0238 11/17/18 0336 11/16/18 0459   WBC 25.0* 20.6* 23.6*   HGB 13.2 14.3 14.1   HCT 40.2 42.2 41.1    351 390     Recent Labs     11/18/18 0238 11/17/18 0336 11/16/18 0459    135* 134*   K 3.8 3.1* 2.8*    102 99   CO2 29 23 26   * 234* 142*   BUN 28* 20 11   CREA 1.43* 1.42* 1.15*   CA 9.6 9.4 8.6   MG 2.1  --  1.8   PHOS 3.1  --   --    ALB  --   --  4.0   TBILI  --   --  0.6   SGOT  --   --  21   ALT  --   --  35       Signed: Chris Juarez MD

## 2018-11-18 NOTE — PROGRESS NOTES
Problem: Falls - Risk of  Goal: *Absence of Falls  Document Javier Fall Risk and appropriate interventions in the flowsheet.   Outcome: Progressing Towards Goal  Fall Risk Interventions:            Medication Interventions: Bed/chair exit alarm         History of Falls Interventions: Bed/chair exit alarm, Door open when patient unattended

## 2018-11-18 NOTE — PROGRESS NOTES
Reason for Admission:  Chest Pain & SOB                    RRAT Score:  8                   Plan for utilizing home health: N/A, the patient is completely independent in her ADLs and IADLs. Likelihood of Readmission:  Low                         Transition of Care Plan:                    The patient lives in a 1 level home with 5 steps to enter with her mother and her mother's . The patient has 3 children, 2 of them, reside locally. She reports that her family is very supportive and all live locally. She is completely independent in her ADLs and IADLs. She is able to drive and her mother will likely transport her home upon d/c. The patient does not have medical insurance. She stated that she used to have [facility] and she is working to have her [facility] reinstated. CM provided the patient with Care Card information to assist with current hospitalization and follow-up. No further CM needs were identified at this time. CM will continue to follow the patient for dispo needs. Care Management Interventions  PCP Verified by CM: Yes(The patient last saw her PCP a few months ago)  Mode of Transport at Discharge: Other (see comment)(The patient's mother will assist the patient with transportation upon d/c )  Transition of Care Consult (CM Consult): Discharge Planning  MyChart Signup: No  Discharge Durable Medical Equipment: No  Physical Therapy Consult: No  Occupational Therapy Consult: No  Speech Therapy Consult: No  Current Support Network:  Other(The patient lives with her mother and her mother's  )  Confirm Follow Up Transport: Self  Freedom of Choice Offered: Yes   Resource Information Provided?: No  Discharge Location  Discharge Placement: Home     [place], [employer]

## 2018-11-19 ENCOUNTER — OFFICE VISIT (OUTPATIENT)
Dept: INTERNAL MEDICINE CLINIC | Age: 48
End: 2018-11-19

## 2018-11-19 VITALS
TEMPERATURE: 97.9 F | HEIGHT: 67 IN | OXYGEN SATURATION: 97 % | HEART RATE: 94 BPM | WEIGHT: 186 LBS | RESPIRATION RATE: 16 BRPM | SYSTOLIC BLOOD PRESSURE: 170 MMHG | DIASTOLIC BLOOD PRESSURE: 110 MMHG | BODY MASS INDEX: 29.19 KG/M2

## 2018-11-19 VITALS
BODY MASS INDEX: 28.6 KG/M2 | OXYGEN SATURATION: 96 % | RESPIRATION RATE: 21 BRPM | DIASTOLIC BLOOD PRESSURE: 116 MMHG | HEIGHT: 67 IN | TEMPERATURE: 97.5 F | HEART RATE: 83 BPM | WEIGHT: 182.2 LBS | SYSTOLIC BLOOD PRESSURE: 187 MMHG

## 2018-11-19 DIAGNOSIS — F32.A ANXIETY AND DEPRESSION: ICD-10-CM

## 2018-11-19 DIAGNOSIS — I10 BENIGN ESSENTIAL HTN: ICD-10-CM

## 2018-11-19 DIAGNOSIS — F41.9 ANXIETY AND DEPRESSION: ICD-10-CM

## 2018-11-19 LAB
ANION GAP SERPL CALC-SCNC: 8 MMOL/L (ref 5–15)
BASOPHILS # BLD: 0 K/UL (ref 0–0.1)
BASOPHILS NFR BLD: 0 % (ref 0–1)
BUN SERPL-MCNC: 30 MG/DL (ref 6–20)
BUN/CREAT SERPL: 22 (ref 12–20)
CALCIUM SERPL-MCNC: 9 MG/DL (ref 8.5–10.1)
CHLORIDE SERPL-SCNC: 104 MMOL/L (ref 97–108)
CO2 SERPL-SCNC: 27 MMOL/L (ref 21–32)
CREAT SERPL-MCNC: 1.35 MG/DL (ref 0.55–1.02)
DIFFERENTIAL METHOD BLD: ABNORMAL
EOSINOPHIL # BLD: 0 K/UL (ref 0–0.4)
EOSINOPHIL NFR BLD: 0 % (ref 0–7)
ERYTHROCYTE [DISTWIDTH] IN BLOOD BY AUTOMATED COUNT: 13.4 % (ref 11.5–14.5)
GLUCOSE SERPL-MCNC: 229 MG/DL (ref 65–100)
HCT VFR BLD AUTO: 40.5 % (ref 35–47)
HGB BLD-MCNC: 13.5 G/DL (ref 11.5–16)
IMM GRANULOCYTES # BLD: 0.1 K/UL (ref 0–0.04)
IMM GRANULOCYTES NFR BLD AUTO: 1 % (ref 0–0.5)
LYMPHOCYTES # BLD: 2.4 K/UL (ref 0.8–3.5)
LYMPHOCYTES NFR BLD: 13 % (ref 12–49)
MAGNESIUM SERPL-MCNC: 2.1 MG/DL (ref 1.6–2.4)
MCH RBC QN AUTO: 31 PG (ref 26–34)
MCHC RBC AUTO-ENTMCNC: 33.3 G/DL (ref 30–36.5)
MCV RBC AUTO: 93.1 FL (ref 80–99)
MONOCYTES # BLD: 1 K/UL (ref 0–1)
MONOCYTES NFR BLD: 6 % (ref 5–13)
NEUTS SEG # BLD: 15 K/UL (ref 1.8–8)
NEUTS SEG NFR BLD: 81 % (ref 32–75)
NRBC # BLD: 0 K/UL (ref 0–0.01)
NRBC BLD-RTO: 0 PER 100 WBC
PHOSPHATE SERPL-MCNC: 3.5 MG/DL (ref 2.6–4.7)
PLATELET # BLD AUTO: 351 K/UL (ref 150–400)
PMV BLD AUTO: 11.1 FL (ref 8.9–12.9)
POTASSIUM SERPL-SCNC: 3.8 MMOL/L (ref 3.5–5.1)
RBC # BLD AUTO: 4.35 M/UL (ref 3.8–5.2)
SODIUM SERPL-SCNC: 139 MMOL/L (ref 136–145)
WBC # BLD AUTO: 18.6 K/UL (ref 3.6–11)

## 2018-11-19 PROCEDURE — 74011250636 HC RX REV CODE- 250/636: Performed by: INTERNAL MEDICINE

## 2018-11-19 PROCEDURE — 84100 ASSAY OF PHOSPHORUS: CPT

## 2018-11-19 PROCEDURE — 83735 ASSAY OF MAGNESIUM: CPT

## 2018-11-19 PROCEDURE — 36415 COLL VENOUS BLD VENIPUNCTURE: CPT

## 2018-11-19 PROCEDURE — 74011636637 HC RX REV CODE- 636/637: Performed by: INTERNAL MEDICINE

## 2018-11-19 PROCEDURE — 94760 N-INVAS EAR/PLS OXIMETRY 1: CPT

## 2018-11-19 PROCEDURE — 80048 BASIC METABOLIC PNL TOTAL CA: CPT

## 2018-11-19 PROCEDURE — 74011250637 HC RX REV CODE- 250/637: Performed by: INTERNAL MEDICINE

## 2018-11-19 PROCEDURE — 85025 COMPLETE CBC W/AUTO DIFF WBC: CPT

## 2018-11-19 RX ORDER — IBUPROFEN 200 MG
1 TABLET ORAL EVERY 24 HOURS
Qty: 30 PATCH | Refills: 0 | Status: SHIPPED | OUTPATIENT
Start: 2018-11-19 | End: 2018-12-19

## 2018-11-19 RX ORDER — DILTIAZEM HYDROCHLORIDE 240 MG/1
240 CAPSULE, COATED, EXTENDED RELEASE ORAL DAILY
Qty: 30 CAP | Refills: 0 | Status: SHIPPED | OUTPATIENT
Start: 2018-11-19 | End: 2018-12-17 | Stop reason: SDUPTHER

## 2018-11-19 RX ORDER — POTASSIUM CHLORIDE 750 MG/1
20 TABLET, EXTENDED RELEASE ORAL DAILY
Qty: 39 TAB | Refills: 12 | Status: SHIPPED | OUTPATIENT
Start: 2018-11-19 | End: 2019-06-21

## 2018-11-19 RX ORDER — HYDROCHLOROTHIAZIDE 25 MG/1
25 TABLET ORAL DAILY
Qty: 30 TAB | Refills: 12 | Status: SHIPPED | OUTPATIENT
Start: 2018-11-19 | End: 2018-12-17 | Stop reason: SDUPTHER

## 2018-11-19 RX ORDER — DOXYCYCLINE 100 MG/1
100 TABLET ORAL 2 TIMES DAILY
Qty: 14 TAB | Refills: 0 | Status: SHIPPED | OUTPATIENT
Start: 2018-11-19 | End: 2018-11-26

## 2018-11-19 RX ORDER — DOXYCYCLINE 100 MG/1
100 TABLET ORAL 2 TIMES DAILY
Qty: 14 TAB | Refills: 0 | Status: SHIPPED | OUTPATIENT
Start: 2018-11-19 | End: 2018-11-19 | Stop reason: SDUPTHER

## 2018-11-19 RX ORDER — CANDESARTAN 32 MG/1
32 TABLET ORAL DAILY
Qty: 30 TAB | Refills: 0 | Status: SHIPPED | OUTPATIENT
Start: 2018-11-19 | End: 2018-12-17 | Stop reason: SDUPTHER

## 2018-11-19 RX ORDER — DILTIAZEM HYDROCHLORIDE 120 MG/1
240 CAPSULE, COATED, EXTENDED RELEASE ORAL DAILY
Status: DISCONTINUED | OUTPATIENT
Start: 2018-11-19 | End: 2018-11-19 | Stop reason: HOSPADM

## 2018-11-19 RX ORDER — BUDESONIDE AND FORMOTEROL FUMARATE DIHYDRATE 160; 4.5 UG/1; UG/1
2 AEROSOL RESPIRATORY (INHALATION) 2 TIMES DAILY
Qty: 1 INHALER | Refills: 4 | Status: SHIPPED | OUTPATIENT
Start: 2018-11-19 | End: 2018-12-17 | Stop reason: SDUPTHER

## 2018-11-19 RX ORDER — ALPRAZOLAM 0.5 MG/1
0.5 TABLET ORAL 2 TIMES DAILY
Qty: 60 TAB | Refills: 0 | Status: SHIPPED | OUTPATIENT
Start: 2018-11-19 | End: 2018-12-17 | Stop reason: SDUPTHER

## 2018-11-19 RX ORDER — DILTIAZEM HYDROCHLORIDE 240 MG/1
240 CAPSULE, COATED, EXTENDED RELEASE ORAL DAILY
Qty: 30 CAP | Refills: 0 | Status: SHIPPED | OUTPATIENT
Start: 2018-11-19 | End: 2018-11-19 | Stop reason: SDUPTHER

## 2018-11-19 RX ORDER — IBUPROFEN 200 MG
1 TABLET ORAL EVERY 24 HOURS
Qty: 30 PATCH | Refills: 0 | Status: SHIPPED | OUTPATIENT
Start: 2018-11-19 | End: 2018-11-19 | Stop reason: SDUPTHER

## 2018-11-19 RX ADMIN — HEPARIN SODIUM 5000 UNITS: 5000 INJECTION INTRAVENOUS; SUBCUTANEOUS at 01:35

## 2018-11-19 RX ADMIN — ALPRAZOLAM 0.5 MG: 0.5 TABLET ORAL at 09:12

## 2018-11-19 RX ADMIN — FLUTICASONE FUROATE AND VILANTEROL TRIFENATATE 1 PUFF: 100; 25 POWDER RESPIRATORY (INHALATION) at 09:13

## 2018-11-19 RX ADMIN — ROPINIROLE HYDROCHLORIDE 0.25 MG: 0.25 TABLET, FILM COATED ORAL at 09:12

## 2018-11-19 RX ADMIN — DILTIAZEM HYDROCHLORIDE 240 MG: 120 CAPSULE, COATED, EXTENDED RELEASE ORAL at 09:12

## 2018-11-19 RX ADMIN — CHLORTHALIDONE 25 MG: 25 TABLET ORAL at 09:14

## 2018-11-19 RX ADMIN — PREDNISONE 30 MG: 20 TABLET ORAL at 09:12

## 2018-11-19 RX ADMIN — LOSARTAN POTASSIUM 100 MG: 100 TABLET, FILM COATED ORAL at 09:12

## 2018-11-19 NOTE — PROGRESS NOTES
4201 Nadiya Valdez w/ Dr. Jerrell Simeon regarding pt wanting to discharge with a BP of 187/116. I informed the pt that it was in her best interest to stay and allow treatment of BP bc she is at risk for complications should leave. Pt stated she has an appt with her PCP Dr. Roger Durbin at [phone] today and he would tx her BP bc he knows what works. Dr. Jerrell Simeon stated that she could not safely d/c this pt with a BP that high. 36  Pt stated her mom is downstairs waiting in the car to pick her up and that she is leaving regardless. Pt given AMA form to sign. Jovan Espitia spoke with Catarina Thomas from risk management who said it was ok to give pt dc RX since they were already signed. 21 [phone]  Dr. Jerrell Simeon called into room and spoke to Pt informing her of her risk should she leave. 1045  Pt d/c home. I reviewed all d/c instructions, follow up appts, and medications. Pt stated understanding. Removed all IV/tele. Pt left with all personal belongings and Rxs.

## 2018-11-19 NOTE — PROGRESS NOTES
1. Have you been to the ER, urgent care clinic since your last visit? Hospitalized since your last visit?no  2. Have you seen or consulted any other health care providers outside of the [address] [place] [address] since your last visit? Include any pap smears or colon screening. No    PHQ over the last two weeks 3/27/2018   PHQ Not Done -   Little interest or pleasure in doing things Several days   Feeling down, depressed, irritable, or hopeless Several days   Total Score PHQ 2 2   Trouble falling or staying asleep, or sleeping too much -   Feeling tired or having little energy -   Poor appetite, weight loss, or overeating -   Feeling bad about yourself - or that you are a failure or have let yourself or your family down -   Moving or speaking so slowly that other people could have noticed; or the opposite being so fidgety that others notice -   Thoughts of being better off dead, or hurting yourself in some way -   How difficult have these problems made it for you to do your work, take care of your home and get along with others -     Dr. Alberto Dixon. Is aware of patients positive depression screening.

## 2018-11-19 NOTE — PROGRESS NOTES
Kristin Doshi is a 57 y.o. female and presents with Hospital Follow Up; Dental Problem (AMA); and Other  . Subjective:    Hypertension Review:  The patient has essential hypertension  Diet and Lifestyle: generally follows a  low sodium diet, exercises sporadically  Home BP Monitoring: is not measured at home. Pertinent ROS:not  taking medications as instructed, no medication side effects noted, no TIA's, no chest pain on exertion, no dyspnea on exertion, no swelling of ankles. She states she was treated for an abscess for a tooth infection  She states she left AMA from the hospital.    COPD REVIEW:  The patient is being seen for follow up of COPD,the patient has been stable  Oxygen: She currently is not on home oxygen therapy. Symptoms: chronic dyspnea: severity = not present: course of sx: stable. Patient uses 2 pillows at night. Patient does continue to smoke cigarettes. She was treated for a SVT while in the hospital.    She was also noted to have a hypokalemia    Anxiety review:  Patient complains of palpitations, chest pain, paresthesias, insomnia, racing thoughts, feelings of losing control, difficulty concentrating  Treatment includes Xanax and no other therapies. She denies recurrent thoughts of death and suicidal thoughts without plan. She experiences the following side effects from the treatment: none. Review of Systems  Constitutional: negative for fevers, chills, anorexia and weight loss  Eyes:   negative for visual disturbance and irritation  ENT:   negative for tinnitus,sore throat,nasal congestion,ear pains. hoarseness  Respiratory:  negative for cough, hemoptysis, dyspnea,wheezing  CV:   negative for chest pain, palpitations, lower extremity edema  GI:   negative for nausea, vomiting, diarrhea, abdominal pain,melena  Endo:               negative for polyuria,polydipsia,polyphagia,heat intolerance  Genitourinary: negative for frequency, dysuria and hematuria  Integument:  negative for rash and pruritus  Hematologic:  negative for easy bruising and gum/nose bleeding  Musculoskel: negative for myalgias, arthralgias, back pain, muscle weakness, joint pain  Neurological:  negative for headaches, dizziness, vertigo, memory problems and gait   Behavl/Psych: feelings of anxiety, depression, mood changes    Past Medical History:   Diagnosis Date    Anemia 2012    Essential hypertension     HX OTHER MEDICAL      baby put up for adoption, tested + for cocaine with that pregnancy    Hypertension     [facility]     Past Surgical History:   Procedure Laterality Date    HX CHOLECYSTECTOMY      HX GYN           Social History     Socioeconomic History    Marital status: LEGALLY      Spouse name: Not on file    Number of children: Not on file    Years of education: Not on file    Highest education level: Not on file   Social Needs    Financial resource strain: Not on file    Food insecurity - worry: Not on file    Food insecurity - inability: Not on file   D[employer] needs - medical: Not on file   [employer] needs - non-medical: Not on file   Occupational History    Not on file   Tobacco Use    Smoking status: Current Every Day Smoker     Packs/day: 0.50    Smokeless tobacco: Never Used   Substance and Sexual Activity    Alcohol use: Yes    Drug use: No    Sexual activity: Yes     Birth control/protection: None   Other Topics Concern    Not on file   Social History Narrative    Not on file     Family History   Problem Relation Age of Onset    Hypertension Father      Current Outpatient Medications   Medication Sig Dispense Refill    candesartan (ATACAND) 32 mg tablet Take 1 Tab by mouth daily. 30 Tab 0    dilTIAZem CD (CARDIZEM CD) 240 mg ER capsule Take 1 Cap by mouth daily for 30 days. 30 Cap 0    hydroCHLOROthiazide (HYDRODIURIL) 25 mg tablet Take 1 Tab by mouth daily. 30 Tab 12    doxycycline (ADOXA) 100 mg tablet Take 1 Tab by mouth two (2) times a day for 7 days. 14 Tab 0    nicotine (NICODERM CQ) 21 mg/24 hr 1 Patch by TransDERmal route every twenty-four (24) hours for 30 days. 30 Patch 0    budesonide-formoterol (SYMBICORT) 160-4.5 mcg/actuation HFAA Take 2 Puffs by inhalation two (2) times a day. 1 Inhaler 4    ALPRAZolam (XANAX) 0.5 mg tablet Take 1 Tab by mouth two (2) times a day. Max Daily Amount: 1 mg. 60 Tab 0    potassium chloride (KLOR-CON) 10 mEq tablet Take 2 Tabs by mouth daily. 39 Tab 12    prednisoLONE 5 mg tab Take 2 Tabs by mouth daily. For 2 days, then 1 tab daily for 2 days, then stop 10 Tab 0    lactobacillus combination no.4 (PROBIOTIC) 3 billion cell cap Take 1 Cap by mouth daily for 7 days. 7 Cap 0    albuterol (PROVENTIL HFA, VENTOLIN HFA, PROAIR HFA) 90 mcg/actuation inhaler Take 1 Puff by inhalation every six (6) hours as needed for Wheezing or Shortness of Breath (cough). 1 Inhaler 3    albuterol-ipratropium (DUO-NEB) 2.5 mg-0.5 mg/3 ml nebu 3 mL by Nebulization route every six (6) hours as needed.  (Patient not taking: Reported on 11/19/2018) 50 mL 0     Allergies   Allergen Reactions    Hydralazine Unknown (comments)     jitteriness    Hydromorphone Itching     morphine    Morphine Itching    Sulfa (Sulfonamide Antibiotics) Nausea and Vomiting       Objective:  Visit Vitals  BP (!) 170/110   Pulse 94   Temp 97.9 °F (36.6 °C) (Oral)   Resp 16   Ht 5' 7\" (1.702 m)   Wt 186 lb (84.4 kg)   SpO2 97%   BMI 29.13 kg/m²     Physical Exam:   General appearance - alert, well appearing, and in no distress  Mental status - alert, oriented to person, place, and time  EYE-ROCKY, EOMI, corneas normal, no foreign bodies  ENT-ENT exam normal, no neck nodes or sinus tenderness  Nose - normal and patent, no erythema, discharge or polyps  Mouth - mucous membranes moist, pharynx normal without lesions  Neck - supple, no significant adenopathy   Chest - clear to auscultation, no wheezes, rales or rhonchi, symmetric air entry   Heart - normal rate, regular rhythm, normal S1, S2, no murmurs, rubs, clicks or gallops   Abdomen - soft, nontender, nondistended, no masses or organomegaly  Lymph- no adenopathy palpable  Ext-peripheral pulses normal, no pedal edema, no clubbing or cyanosis  Skin-Warm and dry. no hyperpigmentation, vitiligo, or suspicious lesions  Neuro -alert, oriented, normal speech, no focal findings or movement disorder noted  Neck-normal C-spine, no tenderness, full ROM without pain  Feet-no nail deformities or callus formation with good pulses noted      Results for orders placed or performed during the hospital encounter of 11/16/18   CULTURE, URINE   Result Value Ref Range    Special Requests: NO SPECIAL REQUESTS      [place] Count <10,000  COLONIES/mL        Culture result: NO SIGNIFICANT GROWTH     CULTURE, BLOOD, PAIRED   Result Value Ref Range    Special Requests: NO SPECIAL REQUESTS      Culture result: NO GROWTH 3 DAYS     CBC WITH AUTOMATED DIFF   Result Value Ref Range    WBC 23.6 (H) 3.6 - 11.0 K/uL    RBC 4.50 3.80 - 5.20 M/uL    HGB 14.1 11.5 - 16.0 g/dL    HCT 41.1 35.0 - 47.0 %    MCV 91.3 80.0 - 99.0 FL    MCH 31.3 26.0 - 34.0 PG    MCHC 34.3 30.0 - 36.5 g/dL    RDW 13.6 11.5 - 14.5 %    PLATELET 643 630 - 614 K/uL    MPV 11.4 8.9 - 12.9 FL    NRBC 0.0 0  WBC    ABSOLUTE NRBC 0.00 0.00 - 0.01 K/uL    NEUTROPHILS 83 (H) 32 - 75 %    LYMPHOCYTES 10 (L) 12 - 49 %    MONOCYTES 6 5 - 13 %    EOSINOPHILS 0 0 - 7 %    BASOPHILS 0 0 - 1 %    IMMATURE GRANULOCYTES 1 (H) 0.0 - 0.5 %    ABS. NEUTROPHILS 19.6 (H) 1.8 - 8.0 K/UL    ABS. LYMPHOCYTES 2.4 0.8 - 3.5 K/UL    ABS. MONOCYTES 1.3 (H) 0.0 - 1.0 K/UL    ABS. EOSINOPHILS 0.1 0.0 - 0.4 K/UL    ABS. BASOPHILS 0.1 0.0 - 0.1 K/UL    ABS. IMM.  GRANS. 0.2 (H) 0.00 - 0.04 K/UL    DF AUTOMATED     METABOLIC PANEL, COMPREHENSIVE   Result Value Ref Range    Sodium 134 (L) 136 - 145 mmol/L Potassium 2.8 (L) 3.5 - 5.1 mmol/L    Chloride 99 97 - 108 mmol/L    CO2 26 21 - 32 mmol/L    Anion gap 9 5 - 15 mmol/L    Glucose 142 (H) 65 - 100 mg/dL    BUN 11 6 - 20 MG/DL    Creatinine 1.15 (H) 0.55 - 1.02 MG/DL    BUN/Creatinine ratio 10 (L) 12 - 20      GFR est AA >60 >60 ml/min/1.73m2    GFR est non-AA 51 (L) >60 ml/min/1.73m2    Calcium 8.6 8.5 - 10.1 MG/DL    Bilirubin, total 0.6 0.2 - 1.0 MG/DL    ALT (SGPT) 35 12 - 78 U/L    AST (SGOT) 21 15 - 37 U/L    Alk.  phosphatase 80 45 - 117 U/L    Protein, total 8.3 (H) 6.4 - 8.2 g/dL    Albumin 4.0 3.5 - 5.0 g/dL    Globulin 4.3 (H) 2.0 - 4.0 g/dL    A-G Ratio 0.9 (L) 1.1 - 2.2     NT-PRO BNP   Result Value Ref Range    NT pro-BNP 3,353 (H) 0 - 125 PG/ML   TROPONIN I   Result Value Ref Range    Troponin-I, Qt. 0.05 (H) <0.05 ng/mL   URINALYSIS W/ RFLX MICROSCOPIC   Result Value Ref Range    Color YELLOW/STRAW      Appearance CLOUDY (A) CLEAR      Specific gravity 1.006 1.003 - 1.030      pH (UA) 5.5 5.0 - 8.0      Protein 30 (A) NEG mg/dL    Glucose NEGATIVE  NEG mg/dL    Ketone NEGATIVE  NEG mg/dL    Bilirubin NEGATIVE  NEG      Blood SMALL (A) NEG      Urobilinogen 0.2 0.2 - 1.0 EU/dL    Nitrites NEGATIVE  NEG      Leukocyte Esterase LARGE (A) NEG      WBC  0 - 4 /hpf    RBC 5-10 0 - 5 /hpf    Epithelial cells MANY (A) FEW /lpf    Bacteria NEGATIVE  NEG /hpf    Hyaline cast 0-2 0 - 5 /lpf   MAGNESIUM   Result Value Ref Range    Magnesium 1.8 1.6 - 2.4 mg/dL   TROPONIN I   Result Value Ref Range    Troponin-I, Qt. <0.05 <0.05 ng/mL   TROPONIN I   Result Value Ref Range    Troponin-I, Qt. <0.05 <9.99 ng/mL   METABOLIC PANEL, BASIC   Result Value Ref Range    Sodium 135 (L) 136 - 145 mmol/L    Potassium 3.1 (L) 3.5 - 5.1 mmol/L    Chloride 102 97 - 108 mmol/L    CO2 23 21 - 32 mmol/L    Anion gap 10 5 - 15 mmol/L    Glucose 234 (H) 65 - 100 mg/dL    BUN 20 6 - 20 MG/DL    Creatinine 1.42 (H) 0.55 - 1.02 MG/DL    BUN/Creatinine ratio 14 12 - 20      GFR est AA 48 (L) >60 ml/min/1.73m2    GFR est non-AA 40 (L) >60 ml/min/1.73m2    Calcium 9.4 8.5 - 10.1 MG/DL   CBC WITH AUTOMATED DIFF   Result Value Ref Range    WBC 20.6 (H) 3.6 - 11.0 K/uL    RBC 4.62 3.80 - 5.20 M/uL    HGB 14.3 11.5 - 16.0 g/dL    HCT 42.2 35.0 - 47.0 %    MCV 91.3 80.0 - 99.0 FL    MCH 31.0 26.0 - 34.0 PG    MCHC 33.9 30.0 - 36.5 g/dL    RDW 13.3 11.5 - 14.5 %    PLATELET 699 150 - 886 K/uL    MPV 11.4 8.9 - 12.9 FL    NRBC 0.0 0  WBC    ABSOLUTE NRBC 0.00 0.00 - 0.01 K/uL    NEUTROPHILS 92 (H) 32 - 75 %    LYMPHOCYTES 5 (L) 12 - 49 %    MONOCYTES 2 (L) 5 - 13 %    EOSINOPHILS 0 0 - 7 %    BASOPHILS 0 0 - 1 %    IMMATURE GRANULOCYTES 1 (H) 0.0 - 0.5 %    ABS. NEUTROPHILS 19.0 (H) 1.8 - 8.0 K/UL    ABS. LYMPHOCYTES 1.0 0.8 - 3.5 K/UL    ABS. MONOCYTES 0.4 0.0 - 1.0 K/UL    ABS. EOSINOPHILS 0.0 0.0 - 0.4 K/UL    ABS. BASOPHILS 0.0 0.0 - 0.1 K/UL    ABS. IMM. GRANS. 0.2 (H) 0.00 - 0.04 K/UL    DF AUTOMATED      RBC COMMENTS NORMOCYTIC, NORMOCHROMIC     TSH 3RD GENERATION   Result Value Ref Range    TSH 0.35 (L) 0.36 - 3.74 uIU/mL   CBC WITH AUTOMATED DIFF   Result Value Ref Range    WBC 25.0 (H) 3.6 - 11.0 K/uL    RBC 4.28 3.80 - 5.20 M/uL    HGB 13.2 11.5 - 16.0 g/dL    HCT 40.2 35.0 - 47.0 %    MCV 93.9 80.0 - 99.0 FL    MCH 30.8 26.0 - 34.0 PG    MCHC 32.8 30.0 - 36.5 g/dL    RDW 13.7 11.5 - 14.5 %    PLATELET 712 303 - 246 K/uL    MPV 11.5 8.9 - 12.9 FL    NRBC 0.0 0  WBC    ABSOLUTE NRBC 0.00 0.00 - 0.01 K/uL    NEUTROPHILS 90 (H) 32 - 75 %    LYMPHOCYTES 5 (L) 12 - 49 %    MONOCYTES 3 (L) 5 - 13 %    EOSINOPHILS 0 0 - 7 %    BASOPHILS 0 0 - 1 %    IMMATURE GRANULOCYTES 1 (H) 0.0 - 0.5 %    ABS. NEUTROPHILS 22.6 (H) 1.8 - 8.0 K/UL    ABS. LYMPHOCYTES 1.3 0.8 - 3.5 K/UL    ABS. MONOCYTES 0.8 0.0 - 1.0 K/UL    ABS. EOSINOPHILS 0.0 0.0 - 0.4 K/UL    ABS. BASOPHILS 0.0 0.0 - 0.1 K/UL    ABS. IMM.  GRANS. 0.2 (H) 0.00 - 0.04 K/UL    DF AUTOMATED     METABOLIC PANEL, BASIC   Result Value Ref Range    Sodium 139 136 - 145 mmol/L    Potassium 3.8 3.5 - 5.1 mmol/L    Chloride 103 97 - 108 mmol/L    CO2 29 21 - 32 mmol/L    Anion gap 7 5 - 15 mmol/L    Glucose 162 (H) 65 - 100 mg/dL    BUN 28 (H) 6 - 20 MG/DL    Creatinine 1.43 (H) 0.55 - 1.02 MG/DL    BUN/Creatinine ratio 20 12 - 20      GFR est AA 48 (L) >60 ml/min/1.73m2    GFR est non-AA 40 (L) >60 ml/min/1.73m2    Calcium 9.6 8.5 - 10.1 MG/DL   MAGNESIUM   Result Value Ref Range    Magnesium 2.1 1.6 - 2.4 mg/dL   PHOSPHORUS   Result Value Ref Range    Phosphorus 3.1 2.6 - 4.7 MG/DL   CBC WITH AUTOMATED DIFF   Result Value Ref Range    WBC 18.6 (H) 3.6 - 11.0 K/uL    RBC 4.35 3.80 - 5.20 M/uL    HGB 13.5 11.5 - 16.0 g/dL    HCT 40.5 35.0 - 47.0 %    MCV 93.1 80.0 - 99.0 FL    MCH 31.0 26.0 - 34.0 PG    MCHC 33.3 30.0 - 36.5 g/dL    RDW 13.4 11.5 - 14.5 %    PLATELET 202 957 - 399 K/uL    MPV 11.1 8.9 - 12.9 FL    NRBC 0.0 0  WBC    ABSOLUTE NRBC 0.00 0.00 - 0.01 K/uL    NEUTROPHILS 81 (H) 32 - 75 %    LYMPHOCYTES 13 12 - 49 %    MONOCYTES 6 5 - 13 %    EOSINOPHILS 0 0 - 7 %    BASOPHILS 0 0 - 1 %    IMMATURE GRANULOCYTES 1 (H) 0.0 - 0.5 %    ABS. NEUTROPHILS 15.0 (H) 1.8 - 8.0 K/UL    ABS. LYMPHOCYTES 2.4 0.8 - 3.5 K/UL    ABS. MONOCYTES 1.0 0.0 - 1.0 K/UL    ABS. EOSINOPHILS 0.0 0.0 - 0.4 K/UL    ABS. BASOPHILS 0.0 0.0 - 0.1 K/UL    ABS. IMM.  GRANS. 0.1 (H) 0.00 - 0.04 K/UL    DF AUTOMATED     METABOLIC PANEL, BASIC   Result Value Ref Range    Sodium 139 136 - 145 mmol/L    Potassium 3.8 3.5 - 5.1 mmol/L    Chloride 104 97 - 108 mmol/L    CO2 27 21 - 32 mmol/L    Anion gap 8 5 - 15 mmol/L    Glucose 229 (H) 65 - 100 mg/dL    BUN 30 (H) 6 - 20 MG/DL    Creatinine 1.35 (H) 0.55 - 1.02 MG/DL    BUN/Creatinine ratio 22 (H) 12 - 20      GFR est AA 51 (L) >60 ml/min/1.73m2    GFR est non-AA 42 (L) >60 ml/min/1.73m2    Calcium 9.0 8.5 - 10.1 MG/DL   MAGNESIUM   Result Value Ref Range    Magnesium 2.1 1.6 - 2.4 mg/dL   PHOSPHORUS   Result Value Ref Range    Phosphorus 3.5 2.6 - 4.7 MG/DL   HCG URINE, QL. - POC   Result Value Ref Range    Pregnancy test,urine (POC) NEGATIVE  NEG     GLUCOSE, POC   Result Value Ref Range    Glucose (POC) 182 (H) 65 - 100 mg/dL    Performed by [facility] (PCT)    GLUCOSE, POC   Result Value Ref Range    Glucose (POC) 212 (H) 65 - 100 mg/dL    [facility]    EKG, 12 LEAD, INITIAL   Result Value Ref Range    Ventricular Rate 99 BPM    Atrial Rate 99 BPM    P-R Interval 134 ms    QRS Duration 92 ms    Q-T Interval 372 ms    QTC Calculation (Bezet) 477 ms    Calculated P Axis 51 degrees    Calculated R Axis 4 degrees    Calculated T Axis -178 degrees    Diagnosis       Sinus rhythm with occasional premature ventricular complexes  Biatrial enlargement  ST & T wave abnormality, consider inferolateral ischemia  Prolonged QT    Confirmed by Som PJIM ([id number]) on 11/16/2018 9:12:29 AM     EKG, 12 LEAD, INITIAL   Result Value Ref Range    Ventricular Rate 159 BPM    Atrial Rate 159 BPM    P-R Interval 170 ms    QRS Duration 90 ms    Q-T Interval 308 ms    QTC Calculation (Bezet) 501 ms    Calculated P Axis 7 degrees    Calculated R Axis 8 degrees    Calculated T Axis -154 degrees    Diagnosis       Probable Supraventricular tachycardia  Cannot rule out Sinus tachycardia  Non-specific ST & T wave changes  Confirmed by Lafaye Samples ([id number]) on 11/17/2018 10:32:13 PM         Assessment/Plan:    ICD-10-CM ICD-9-CM    1. Benign essential HTN I10 401.1 candesartan (ATACAND) 32 mg tablet      budesonide-formoterol (SYMBICORT) 160-4.5 mcg/actuation HFAA   2. Anxiety and depression F41.9 300.00 ALPRAZolam (XANAX) 0.5 mg tablet    F32.9 311      Orders Placed This Encounter    candesartan (ATACAND) 32 mg tablet     Sig: Take 1 Tab by mouth daily. Dispense:  30 Tab     Refill:  0    dilTIAZem CD (CARDIZEM CD) 240 mg ER capsule     Sig: Take 1 Cap by mouth daily for 30 days.      Dispense:  30 Cap     Refill:  0  hydroCHLOROthiazide (HYDRODIURIL) 25 mg tablet     Sig: Take 1 Tab by mouth daily. Dispense:  30 Tab     Refill:  12    doxycycline (ADOXA) 100 mg tablet     Sig: Take 1 Tab by mouth two (2) times a day for 7 days. Dispense:  14 Tab     Refill:  0    nicotine (NICODERM CQ) 21 mg/24 hr     Si Patch by TransDERmal route every twenty-four (24) hours for 30 days. Dispense:  30 Patch     Refill:  0    budesonide-formoterol (SYMBICORT) 160-4.5 mcg/actuation HFAA     Sig: Take 2 Puffs by inhalation two (2) times a day. Dispense:  1 Inhaler     Refill:  4    ALPRAZolam (XANAX) 0.5 mg tablet     Sig: Take 1 Tab by mouth two (2) times a day. Max Daily Amount: 1 mg. Dispense:  60 Tab     Refill:  0    potassium chloride (KLOR-CON) 10 mEq tablet     Sig: Take 2 Tabs by mouth daily. Dispense:  39 Tab     Refill:  12     lose weight, increase physical activity, follow low fat diet, follow low salt diet, continue present plan,Take 81mg aspirin daily  Patient Instructions   [facility] Activation    Thank you for requesting access to [facility]. Please follow the instructions below to securely access and download your online medical record. [facility] allows you to send messages to your doctor, view your test results, renew your prescriptions, schedule appointments, and more. How Do I Sign Up? 1. In your internet browser, go to [email or web address]  2. Click on the First Time User? Click Here link in the Sign In box. You will be redirect to the New Member Sign Up page. 3. Enter your [facility] Access Code exactly as it appears below. You will not need to use this code after youve completed the sign-up process. If you do not sign up before the expiration date, you must request a new code. [facility] Access Code: Activation code not generated  Current [facility] Status: Patient Declined (This is the date your [facility] access code will )    4.  Enter the last four digits of your Social Security Number (xxxx) and Date of Birth (mm/dd/yyyy) as indicated and click Submit. You will be taken to the next sign-up page. 5. Create a Dem[employer] ID. This will be your [place] login ID and cannot be changed, so think of one that is secure and easy to remember. 6. Create a [place] password. You can change your password at any time. 7. Enter your Password Reset Question and Answer. This can be used at a later time if you forget your password. 8. Enter your e-mail address. You will receive e-mail notification when new information is available in [address] 9. Click Sign Up. You can now view and download portions of your medical record. 10. Click the Download Summary menu link to download a portable copy of your medical information. Additional Information    If you have questions, please visit the Frequently Asked Questions section of the [place] website at [email or web address] Remember, [place] is NOT to be used for urgent needs. For medical emergencies, dial 911. Follow-up Disposition:  Return in about 1 week (around 11/26/2018), or if symptoms worsen or fail to improve. I have reviewed with the patient details of the assessment and plan and all questions were answered. Relevent patient education was performed. The most recent lab findings were reviewed with the patient. An After Visit Summary was printed and given to the patient.

## 2018-11-19 NOTE — DISCHARGE INSTRUCTIONS
HOSPITALIST DISCHARGE INSTRUCTIONS    NAME: Dionne Fernandes   :  1970   MRN:  [id number]     Date/Time:  2018 8:58 AM    ADMIT DATE: 2018   DISCHARGE DATE: 2018         · It is important that you take the medication exactly as they are prescribed. · Keep your medication in the bottles provided by the pharmacist and keep a list of the medication names, dosages, and times to be taken in your wallet. · Do not take other medications without consulting your doctor. What to do at Home    Recommended diet:  Cardiac Diet    Recommended activity: Activity as tolerated  Avoid cocaine  Discuss with PCP checking blood test CBC, CMP in 2 days  Follow-up final blood culture report with PCP      If you have questions regarding the hospital related prescriptions or hospital related issues please call [facility] at [phone]. You can always direct your questions to your primary care doctor if you are unable to reach your hospital physician; your PCP works as an extension of your hospital doctor just like your hospital doctor is an extension of your PCP for your time at the hospital [facility])    If you experience any of the following symptoms then please call your primary care physician or return to the emergency room if you cannot get hold of your doctor:    Fever, chills, nausea, vomiting, or persistent diarrhea  Worsening weakness or new problems with your speech or balance  Dark stools or visible blood in your stools  New Leg swelling or shortness of breath as these could be signs of a clot    Additional Instructions:      Bring these papers with you to your follow up appointments. The papers will help your doctors be sure to continue the care plan from the hospital.              Information obtained by :  I understand that if any problems occur once I am at home I am to contact my physician.     I understand and acknowledge receipt of the instructions indicated above.                                                                                                                                           Physician's or R.N.'s Signature                                                                  Date/Time                                                                                                                                              Patient or Representative Signature

## 2018-11-19 NOTE — PATIENT INSTRUCTIONS
[email or web address] Activation    Thank you for requesting access to [facility]. Please follow the instructions below to securely access and download your online medical record. [facility] allows you to send messages to your doctor, view your test results, renew your prescriptions, schedule appointments, and more. How Do I Sign Up? 1. In your internet browser, go to [email or web address]  2. Click on the First Time User? Click Here link in the Sign In box. You will be redirect to the New Member Sign Up page. 3. Enter your [facility]S Access Code exactly as it appears below. You will not need to use this code after youve completed the sign-up process. If you do not sign up before the expiration date, you must request a new code. [facility]S Access Code: Activation code not generated  Current [facility] Status: Patient Declined (This is the date your [facility] access code will )    4. Enter the last four digits of your Social Security Number (xxxx) and Date of Birth (mm/dd/yyyy) as indicated and click Submit. You will be taken to the next sign-up page. 5. Create a [facility]S ID. This will be your [facility] login ID and cannot be changed, so think of one that is secure and easy to remember. 6. Create a [facility]S password. You can change your password at any time. 7. Enter your Password Reset Question and Answer. This can be used at a later time if you forget your password. 8. Enter your e-mail address. You will receive e-mail notification when new information is available in [address] 9. Click Sign Up. You can now view and download portions of your medical record. 10. Click the Download Summary menu link to download a portable copy of your medical information. Additional Information    If you have questions, please visit the Frequently Asked Questions section of the [facility]S website at [email or web address] Remember, [facility] is NOT to be used for urgent needs. For medical emergencies, dial 911.

## 2018-11-19 NOTE — PROGRESS NOTES
CM acknowledged discharge order. Anna ROSARIO informed this CM patient is leaving AMA. Patient has elevated BP and attending would like her to stay for further assessment. Anamaria, Charge RN [facility] called Risk Management to inquire about signed scripts and providing to patient. CM will sign off.      [address] [facility]  Ext [phone]

## 2018-11-19 NOTE — DISCHARGE SUMMARY
Hospitalist Discharge Summary     Patient ID:  Benoit Schaefer  [id number]  57 y.o.  1970    PCP on record: Shilo Topete MD    Admit date: 11/16/2018  Discharge date and time: 11/19/2018      DISCHARGE DIAGNOSIS:  Supraventricular tachycardia  Required adenosine twice  troponins x2, < 0.05, replace potassium  Cardiology Consult is appreciated  Echo-EF 70 % to 75 %. No obvious wall motion abnormalities   Hemodynamically stable  Will need 30 day event monitor and f/u with Dr. Kayleigh Pineda as OP    Shortness of breath cough and phlegm most likely due to COPD exacerbation  CT of chest showed no infiltrate  Solu-Medrol, DuoNeb and azithromycin  Mucinex  Nicotine patch  Counseled regarding smoking cessation     Leucocytosis may be reactive, 2/2 steroids, will rule out infectious cause  Afebrile, CTA chest shows no infiltrate  UA is abnormal but not a clean catch, urine cx negative  Check cbc in am   Blood cx is negative so far     Hypokalemia  Replaced    Hypertension-unbcontrolled   Losartan, chlorthalidone   Cardizem increased 11/19, BP elevated at time of dc pt informed of risks of HTN including CHF, MI, stroke and  renal failure, pt verbalized understanding and refused to stay   Pt signed against medical advice    Generalized anxiety disorder  home Xanax    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              _______________________________________________________________________  Patient seen and examined by me on discharge day. Pertinent Findings:  Gen:    Not in distress  Chest: Clear lungs  CVS:   Regular rhythm. No edema  Abd:  Soft, not distended, not tender  Neuro:  Alert,awake, oriented x 3 , grossly intact  _______________________________________________________________________  DISCHARGE MEDICATIONS:   Current Discharge Medication List      START taking these medications    Details   nicotine (NICODERM CQ) 21 mg/24 hr 1 Patch by TransDERmal route every twenty-four (24) hours for 30 days. Qty: 30 Patch, Refills: 0      dilTIAZem CD (CARDIZEM CD) 240 mg ER capsule Take 1 Cap by mouth daily for 30 days. Qty: 30 Cap, Refills: 0      doxycycline (ADOXA) 100 mg tablet Take 1 Tab by mouth two (2) times a day for 7 days. Qty: 14 Tab, Refills: 0      prednisoLONE 5 mg tab Take 2 Tabs by mouth daily. For 2 days, then 1 tab daily for 2 days, then stop  Qty: 10 Tab, Refills: 0      lactobacillus combination no.4 (PROBIOTIC) 3 billion cell cap Take 1 Cap by mouth daily for 7 days. Qty: 7 Cap, Refills: 0         CONTINUE these medications which have NOT CHANGED    Details   candesartan (ATACAND) 32 mg tablet Take 1 Tab by mouth daily. Qty: 30 Tab, Refills: 0    Associated Diagnoses: Benign essential HTN      chlorthalidone (HYGROTEN) 25 mg tablet Take 1 Tab by mouth daily. Qty: 30 Tab, Refills: 0    Associated Diagnoses: Benign essential HTN      ALPRAZolam (XANAX) 0.5 mg tablet Take 1 Tab by mouth two (2) times a day. Max Daily Amount: 1 mg. Qty: 60 Tab, Refills: 0    Associated Diagnoses: Anxiety and depression      albuterol (PROVENTIL HFA, VENTOLIN HFA, PROAIR HFA) 90 mcg/actuation inhaler Take 1 Puff by inhalation every six (6) hours as needed for Wheezing or Shortness of Breath (cough).   Qty: 1 Inhaler, Refills: 3    Associated Diagnoses: Chronic obstructive pulmonary disease, unspecified COPD type (HCC)      budesonide-formoterol (SYMBICORT) 160-4.5 mcg/actuation HFAA Take 2 Puffs by inhalation two (2) times a day. Qty: 1 Inhaler, Refills: 4    Associated Diagnoses: Benign essential HTN      albuterol-ipratropium (DUO-NEB) 2.5 mg-0.5 mg/3 ml nebu 3 mL by Nebulization route every six (6) hours as needed.   Qty: 50 mL, Refills: 0         STOP taking these medications       amLODIPine (NORVASC) 5 mg tablet Comments:   Reason for Stopping:                 ______________________________________________________________________        Follow up with:   PCP : Matthew Hernandez MD  Follow-up Information     Follow up With Specialties Details Why Contact Info    Fredy Gonzalez MD Cardiology Schedule an appointment as soon as possible for a visit in 4 weeks  [address]  [address]  [facility]  [phone]      Matthew Hernandez MD Family Practice In 2 days hopsital follow-up, check CBC, BMP in 2 days [address] [facility] [address] [phone]      Betsey Mcmahan MD Nephrology In 1 week [address]  [facility] [address] [phone]                Total time in minutes spent coordinating this discharge (includes going over instructions, follow-up, prescriptions, and preparing report for sign off to her PCP) :  50 minutes    Signed:  Tevin Arguello MD

## 2018-11-21 LAB
BACTERIA SPEC CULT: NORMAL
SERVICE CMNT-IMP: NORMAL

## 2018-12-17 ENCOUNTER — OFFICE VISIT (OUTPATIENT)
Dept: INTERNAL MEDICINE CLINIC | Age: 48
End: 2018-12-17

## 2018-12-17 VITALS
OXYGEN SATURATION: 98 % | HEIGHT: 67 IN | HEART RATE: 76 BPM | RESPIRATION RATE: 16 BRPM | BODY MASS INDEX: 30.61 KG/M2 | WEIGHT: 195 LBS | DIASTOLIC BLOOD PRESSURE: 98 MMHG | TEMPERATURE: 98.1 F | SYSTOLIC BLOOD PRESSURE: 160 MMHG

## 2018-12-17 DIAGNOSIS — I10 BENIGN ESSENTIAL HTN: ICD-10-CM

## 2018-12-17 DIAGNOSIS — I10 ESSENTIAL HYPERTENSION: ICD-10-CM

## 2018-12-17 DIAGNOSIS — F41.9 ANXIETY AND DEPRESSION: Primary | ICD-10-CM

## 2018-12-17 DIAGNOSIS — F32.A ANXIETY AND DEPRESSION: Primary | ICD-10-CM

## 2018-12-17 DIAGNOSIS — J44.9 CHRONIC OBSTRUCTIVE PULMONARY DISEASE, UNSPECIFIED COPD TYPE (HCC): ICD-10-CM

## 2018-12-17 DIAGNOSIS — I47.1 SVT (SUPRAVENTRICULAR TACHYCARDIA) (HCC): ICD-10-CM

## 2018-12-17 RX ORDER — ALPRAZOLAM 0.5 MG/1
0.5 TABLET ORAL 2 TIMES DAILY
Qty: 60 TAB | Refills: 0 | Status: SHIPPED | OUTPATIENT
Start: 2018-12-17 | End: 2019-01-18 | Stop reason: SDUPTHER

## 2018-12-17 NOTE — PROGRESS NOTES
Vashti Cummings is a 57 y.o. female and presents with Hypertension and Depression  . Subjective:    She has been concerned about her weight    Hypertension Review:  The patient has essential hypertension  Diet and Lifestyle: generally follows a  low sodium diet, exercises sporadically  Home BP Monitoring: is not measured at home. Pertinent ROS:not  taking medications as instructed, no medication side effects noted, no TIA's, no chest pain on exertion, no dyspnea on exertion, no swelling of ankles. COPD REVIEW:  The patient is being seen for follow up of COPD,the patient has been stable  Oxygen: She currently is not on home oxygen therapy. Symptoms: chronic dyspnea: severity = not present: course of sx: stable. Patient uses 2 pillows at night. Patient does continue to smoke cigarettes. She was treated for a SVT while in the hospital.and states she has not had any frequent occurrence reported         Anxiety review:  Patient complains of palpitations, chest pain, paresthesias, insomnia, racing thoughts, feelings of losing control, difficulty concentrating has improved  Treatment includes Xanax and no other therapies. She denies recurrent thoughts of death and suicidal thoughts without plan. She experiences the following side effects from the treatment: none. Review of Systems  Constitutional: negative for fevers, chills, anorexia and weight loss  Eyes:   negative for visual disturbance and irritation  ENT:   negative for tinnitus,sore throat,nasal congestion,ear pains. hoarseness  Respiratory:  negative for cough, hemoptysis, dyspnea,wheezing  CV:   negative for chest pain, palpitations, lower extremity edema  GI:   negative for nausea, vomiting, diarrhea, abdominal pain,melena  Endo:               negative for polyuria,polydipsia,polyphagia,heat intolerance  Genitourinary: negative for frequency, dysuria and hematuria  Integument:  negative for rash and pruritus  Hematologic: negative for easy bruising and gum/nose bleeding  Musculoskel: negative for myalgias, arthralgias, back pain, muscle weakness, joint pain  Neurological:  negative for headaches, dizziness, vertigo, memory problems and gait   Behavl/Psych: feelings of anxiety, depression, mood changes reported. Past Medical History:   Diagnosis Date    Anemia 2012    Essential hypertension     HX OTHER MEDICAL     2009 baby put up for adoption, tested + for cocaine with that pregnancy    Hypertension     SVT [facility]supraventricular tachycardia) ([facility] [address] 2018     Past Surgical History:   Procedure Laterality Date    HX CHOLECYSTECTOMY      HX GYN           Social History     Socioeconomic History    Marital status: LEGALLY      Spouse name: Not on file    Number of children: Not on file    Years of education: Not on file    Highest education level: Not on file   Tobacco Use    Smoking status: Current Every Day Smoker     Packs/day: 0.50    Smokeless tobacco: Never Used   Substance and Sexual Activity    Alcohol use: Yes    Drug use: No    Sexual activity: Yes     Birth control/protection: None     Family History   Problem Relation Age of Onset    Hypertension Father      Current Outpatient Medications   Medication Sig Dispense Refill    ALPRAZolam (XANAX) 0.5 mg tablet Take 1 Tab by mouth two (2) times a day. Max Daily Amount: 1 mg. 60 Tab 0    candesartan (ATACAND) 32 mg tablet Take 1 Tab by mouth daily. 30 Tab 0    dilTIAZem CD (CARDIZEM CD) 240 mg ER capsule Take 1 Cap by mouth daily for 30 days. 30 Cap 0    hydroCHLOROthiazide (HYDRODIURIL) 25 mg tablet Take 1 Tab by mouth daily. 30 Tab 12    nicotine (NICODERM CQ) 21 mg/24 hr 1 Patch by TransDERmal route every twenty-four (24) hours for 30 days. 30 Patch 0    budesonide-formoterol (SYMBICORT) 160-4.5 mcg/actuation HFAA Take 2 Puffs by inhalation two (2) times a day.  1 Inhaler 4    potassium chloride (KLOR-CON) 10 mEq tablet Take 2 Tabs by mouth daily. 39 Tab 12     Allergies   Allergen Reactions    Hydralazine Unknown (comments)     jitteriness    Hydromorphone Itching     morphine    Morphine Itching    Sulfa (Sulfonamide Antibiotics) Nausea and Vomiting       Objective:  Visit Vitals  BP (!) 160/98   Pulse 76   Temp 98.1 °F (36.7 °C) (Oral)   Resp 16   Ht 5' 7\" (1.702 m)   Wt 195 lb (88.5 kg)   SpO2 98%   BMI 30.54 kg/m²     Physical Exam:   General appearance - alert, well appearing, and in no distress  Mental status - alert, oriented to person, place, and time  EYE-ROCKY, EOMI, corneas normal, no foreign bodies  ENT-ENT exam normal, no neck nodes or sinus tenderness  Nose - normal and patent, no erythema, discharge or polyps  Mouth - mucous membranes moist, pharynx normal without lesions  Neck - supple, no significant adenopathy   Chest - clear to auscultation, no wheezes, rales or rhonchi, symmetric air entry   Heart - normal rate, regular rhythm, normal S1, S2, no murmurs, rubs, clicks or gallops   Abdomen - soft, nontender, nondistended, no masses or organomegaly  Lymph- no adenopathy palpable  Ext-peripheral pulses normal, no pedal edema, no clubbing or cyanosis  Skin-Warm and dry.  no hyperpigmentation, vitiligo, or suspicious lesions  Neuro -alert, oriented, normal speech, no focal findings or movement disorder noted  Neck-normal C-spine, no tenderness, full ROM without pain  Feet-no nail deformities or callus formation with good pulses noted      Results for orders placed or performed during the hospital encounter of 11/16/18   CULTURE, URINE   Result Value Ref Range    Special Requests: NO SPECIAL REQUESTS      Malone Count <10,000  COLONIES/mL        Culture result: NO SIGNIFICANT GROWTH     CULTURE, BLOOD, PAIRED   Result Value Ref Range    Special Requests: NO SPECIAL REQUESTS      Culture result: NO GROWTH 5 DAYS     CBC WITH AUTOMATED DIFF   Result Value Ref Range    WBC 23.6 (H) 3.6 - 11.0 K/uL    RBC 4.50 3.80 - 5.20 M/uL    HGB 14.1 11.5 - 16.0 g/dL    HCT 41.1 35.0 - 47.0 %    MCV 91.3 80.0 - 99.0 FL    MCH 31.3 26.0 - 34.0 PG    MCHC 34.3 30.0 - 36.5 g/dL    RDW 13.6 11.5 - 14.5 %    PLATELET 570 676 - 135 K/uL    MPV 11.4 8.9 - 12.9 FL    NRBC 0.0 0  WBC    ABSOLUTE NRBC 0.00 0.00 - 0.01 K/uL    NEUTROPHILS 83 (H) 32 - 75 %    LYMPHOCYTES 10 (L) 12 - 49 %    MONOCYTES 6 5 - 13 %    EOSINOPHILS 0 0 - 7 %    BASOPHILS 0 0 - 1 %    IMMATURE GRANULOCYTES 1 (H) 0.0 - 0.5 %    ABS. NEUTROPHILS 19.6 (H) 1.8 - 8.0 K/UL    ABS. LYMPHOCYTES 2.4 0.8 - 3.5 K/UL    ABS. MONOCYTES 1.3 (H) 0.0 - 1.0 K/UL    ABS. EOSINOPHILS 0.1 0.0 - 0.4 K/UL    ABS. BASOPHILS 0.1 0.0 - 0.1 K/UL    ABS. IMM. GRANS. 0.2 (H) 0.00 - 0.04 K/UL    DF AUTOMATED     METABOLIC PANEL, COMPREHENSIVE   Result Value Ref Range    Sodium 134 (L) 136 - 145 mmol/L    Potassium 2.8 (L) 3.5 - 5.1 mmol/L    Chloride 99 97 - 108 mmol/L    CO2 26 21 - 32 mmol/L    Anion gap 9 5 - 15 mmol/L    Glucose 142 (H) 65 - 100 mg/dL    BUN 11 6 - 20 MG/DL    Creatinine 1.15 (H) 0.55 - 1.02 MG/DL    BUN/Creatinine ratio 10 (L) 12 - 20      GFR est AA >60 >60 ml/min/1.73m2    GFR est non-AA 51 (L) >60 ml/min/1.73m2    Calcium 8.6 8.5 - 10.1 MG/DL    Bilirubin, total 0.6 0.2 - 1.0 MG/DL    ALT (SGPT) 35 12 - 78 U/L    AST (SGOT) 21 15 - 37 U/L    Alk.  phosphatase 80 45 - 117 U/L    Protein, total 8.3 (H) 6.4 - 8.2 g/dL    Albumin 4.0 3.5 - 5.0 g/dL    Globulin 4.3 (H) 2.0 - 4.0 g/dL    A-G Ratio 0.9 (L) 1.1 - 2.2     NT-PRO BNP   Result Value Ref Range    NT pro-BNP 3,353 (H) 0 - 125 PG/ML   TROPONIN I   Result Value Ref Range    Troponin-I, Qt. 0.05 (H) <0.05 ng/mL   URINALYSIS W/ RFLX MICROSCOPIC   Result Value Ref Range    Color YELLOW/STRAW      Appearance CLOUDY (A) CLEAR      Specific gravity 1.006 1.003 - 1.030      pH (UA) 5.5 5.0 - 8.0      Protein 30 (A) NEG mg/dL    Glucose NEGATIVE  NEG mg/dL    Ketone NEGATIVE  NEG mg/dL    Bilirubin NEGATIVE  NEG      Blood SMALL (A) NEG      Urobilinogen 0.2 0.2 - 1.0 EU/dL    Nitrites NEGATIVE  NEG      Leukocyte Esterase LARGE (A) NEG      WBC  0 - 4 /hpf    RBC 5-10 0 - 5 /hpf    Epithelial cells MANY (A) FEW /lpf    Bacteria NEGATIVE  NEG /hpf    Hyaline cast 0-2 0 - 5 /lpf   MAGNESIUM   Result Value Ref Range    Magnesium 1.8 1.6 - 2.4 mg/dL   TROPONIN I   Result Value Ref Range    Troponin-I, Qt. <0.05 <0.05 ng/mL   TROPONIN I   Result Value Ref Range    Troponin-I, Qt. <0.05 <9.14 ng/mL   METABOLIC PANEL, BASIC   Result Value Ref Range    Sodium 135 (L) 136 - 145 mmol/L    Potassium 3.1 (L) 3.5 - 5.1 mmol/L    Chloride 102 97 - 108 mmol/L    CO2 23 21 - 32 mmol/L    Anion gap 10 5 - 15 mmol/L    Glucose 234 (H) 65 - 100 mg/dL    BUN 20 6 - 20 MG/DL    Creatinine 1.42 (H) 0.55 - 1.02 MG/DL    BUN/Creatinine ratio 14 12 - 20      GFR est AA 48 (L) >60 ml/min/1.73m2    GFR est non-AA 40 (L) >60 ml/min/1.73m2    Calcium 9.4 8.5 - 10.1 MG/DL   CBC WITH AUTOMATED DIFF   Result Value Ref Range    WBC 20.6 (H) 3.6 - 11.0 K/uL    RBC 4.62 3.80 - 5.20 M/uL    HGB 14.3 11.5 - 16.0 g/dL    HCT 42.2 35.0 - 47.0 %    MCV 91.3 80.0 - 99.0 FL    MCH 31.0 26.0 - 34.0 PG    MCHC 33.9 30.0 - 36.5 g/dL    RDW 13.3 11.5 - 14.5 %    PLATELET 923 967 - 546 K/uL    MPV 11.4 8.9 - 12.9 FL    NRBC 0.0 0  WBC    ABSOLUTE NRBC 0.00 0.00 - 0.01 K/uL    NEUTROPHILS 92 (H) 32 - 75 %    LYMPHOCYTES 5 (L) 12 - 49 %    MONOCYTES 2 (L) 5 - 13 %    EOSINOPHILS 0 0 - 7 %    BASOPHILS 0 0 - 1 %    IMMATURE GRANULOCYTES 1 (H) 0.0 - 0.5 %    ABS. NEUTROPHILS 19.0 (H) 1.8 - 8.0 K/UL    ABS. LYMPHOCYTES 1.0 0.8 - 3.5 K/UL    ABS. MONOCYTES 0.4 0.0 - 1.0 K/UL    ABS. EOSINOPHILS 0.0 0.0 - 0.4 K/UL    ABS. BASOPHILS 0.0 0.0 - 0.1 K/UL    ABS. IMM.  GRANS. 0.2 (H) 0.00 - 0.04 K/UL    DF AUTOMATED      RBC COMMENTS NORMOCYTIC, NORMOCHROMIC     TSH 3RD GENERATION   Result Value Ref Range    TSH 0.35 (L) 0.36 - 3.74 uIU/mL   CBC WITH AUTOMATED DIFF   Result Value Ref Range WBC 25.0 (H) 3.6 - 11.0 K/uL    RBC 4.28 3.80 - 5.20 M/uL    HGB 13.2 11.5 - 16.0 g/dL    HCT 40.2 35.0 - 47.0 %    MCV 93.9 80.0 - 99.0 FL    MCH 30.8 26.0 - 34.0 PG    MCHC 32.8 30.0 - 36.5 g/dL    RDW 13.7 11.5 - 14.5 %    PLATELET 675 871 - 742 K/uL    MPV 11.5 8.9 - 12.9 FL    NRBC 0.0 0  WBC    ABSOLUTE NRBC 0.00 0.00 - 0.01 K/uL    NEUTROPHILS 90 (H) 32 - 75 %    LYMPHOCYTES 5 (L) 12 - 49 %    MONOCYTES 3 (L) 5 - 13 %    EOSINOPHILS 0 0 - 7 %    BASOPHILS 0 0 - 1 %    IMMATURE GRANULOCYTES 1 (H) 0.0 - 0.5 %    ABS. NEUTROPHILS 22.6 (H) 1.8 - 8.0 K/UL    ABS. LYMPHOCYTES 1.3 0.8 - 3.5 K/UL    ABS. MONOCYTES 0.8 0.0 - 1.0 K/UL    ABS. EOSINOPHILS 0.0 0.0 - 0.4 K/UL    ABS. BASOPHILS 0.0 0.0 - 0.1 K/UL    ABS. IMM.  GRANS. 0.2 (H) 0.00 - 0.04 K/UL    DF AUTOMATED     METABOLIC PANEL, BASIC   Result Value Ref Range    Sodium 139 136 - 145 mmol/L    Potassium 3.8 3.5 - 5.1 mmol/L    Chloride 103 97 - 108 mmol/L    CO2 29 21 - 32 mmol/L    Anion gap 7 5 - 15 mmol/L    Glucose 162 (H) 65 - 100 mg/dL    BUN 28 (H) 6 - 20 MG/DL    Creatinine 1.43 (H) 0.55 - 1.02 MG/DL    BUN/Creatinine ratio 20 12 - 20      GFR est AA 48 (L) >60 ml/min/1.73m2    GFR est non-AA 40 (L) >60 ml/min/1.73m2    Calcium 9.6 8.5 - 10.1 MG/DL   MAGNESIUM   Result Value Ref Range    Magnesium 2.1 1.6 - 2.4 mg/dL   PHOSPHORUS   Result Value Ref Range    Phosphorus 3.1 2.6 - 4.7 MG/DL   CBC WITH AUTOMATED DIFF   Result Value Ref Range    WBC 18.6 (H) 3.6 - 11.0 K/uL    RBC 4.35 3.80 - 5.20 M/uL    HGB 13.5 11.5 - 16.0 g/dL    HCT 40.5 35.0 - 47.0 %    MCV 93.1 80.0 - 99.0 FL    MCH 31.0 26.0 - 34.0 PG    MCHC 33.3 30.0 - 36.5 g/dL    RDW 13.4 11.5 - 14.5 %    PLATELET 904 383 - 029 K/uL    MPV 11.1 8.9 - 12.9 FL    NRBC 0.0 0  WBC    ABSOLUTE NRBC 0.00 0.00 - 0.01 K/uL    NEUTROPHILS 81 (H) 32 - 75 %    LYMPHOCYTES 13 12 - 49 %    MONOCYTES 6 5 - 13 %    EOSINOPHILS 0 0 - 7 %    BASOPHILS 0 0 - 1 %    IMMATURE GRANULOCYTES 1 (H) 0.0 - 0.5 %    ABS. NEUTROPHILS 15.0 (H) 1.8 - 8.0 K/UL    ABS. LYMPHOCYTES 2.4 0.8 - 3.5 K/UL    ABS. MONOCYTES 1.0 0.0 - 1.0 K/UL    ABS. EOSINOPHILS 0.0 0.0 - 0.4 K/UL    ABS. BASOPHILS 0.0 0.0 - 0.1 K/UL    ABS. IMM.  GRANS. 0.1 (H) 0.00 - 0.04 K/UL    DF AUTOMATED     METABOLIC PANEL, BASIC   Result Value Ref Range    Sodium 139 136 - 145 mmol/L    Potassium 3.8 3.5 - 5.1 mmol/L    Chloride 104 97 - 108 mmol/L    CO2 27 21 - 32 mmol/L    Anion gap 8 5 - 15 mmol/L    Glucose 229 (H) 65 - 100 mg/dL    BUN 30 (H) 6 - 20 MG/DL    Creatinine 1.35 (H) 0.55 - 1.02 MG/DL    BUN/Creatinine ratio 22 (H) 12 - 20      GFR est AA 51 (L) >60 ml/min/1.73m2    GFR est non-AA 42 (L) >60 ml/min/1.73m2    Calcium 9.0 8.5 - 10.1 MG/DL   MAGNESIUM   Result Value Ref Range    Magnesium 2.1 1.6 - 2.4 mg/dL   PHOSPHORUS   Result Value Ref Range    Phosphorus 3.5 2.6 - 4.7 MG/DL   HCG URINE, QL. - POC   Result Value Ref Range    Pregnancy test,urine (POC) NEGATIVE  NEG     GLUCOSE, POC   Result Value Ref Range    Glucose (POC) 182 (H) 65 - 100 mg/dL    Performed by Pedro Vizcarra (PCT)    GLUCOSE, POC   Result Value Ref Range    Glucose (POC) 212 (H) 65 - 100 mg/dL    Performed by Dana    EKG, 12 LEAD, INITIAL   Result Value Ref Range    Ventricular Rate 99 BPM    Atrial Rate 99 BPM    P-R Interval 134 ms    QRS Duration 92 ms    Q-T Interval 372 ms    QTC Calculation (Bezet) 477 ms    Calculated P Axis 51 degrees    Calculated R Axis 4 degrees    Calculated T Axis -178 degrees    Diagnosis       Sinus rhythm with occasional premature ventricular complexes  Biatrial enlargement  ST & T wave abnormality, consider inferolateral ischemia  Prolonged QT    Confirmed by Som, P.V. ([id number]) on 11/16/2018 9:12:29 AM     EKG, 12 LEAD, INITIAL   Result Value Ref Range    Ventricular Rate 159 BPM    Atrial Rate 159 BPM    P-R Interval 170 ms    QRS Duration 90 ms    Q-T Interval 308 ms    QTC Calculation (Bezet) 501 ms Calculated P Axis 7 degrees    Calculated R Axis 8 degrees    Calculated T Axis -154 degrees    Diagnosis       Probable Supraventricular tachycardia  Cannot rule out Sinus tachycardia  Non-specific ST & T wave changes  Confirmed by Sonia Ann ([id number]) on 11/17/2018 10:32:13 PM         Assessment/Plan:    ICD-10-CM ICD-9-CM    1. Anxiety and depression F41.9 300.00 REFERRAL TO PSYCHIATRY    F32.9 311 ALPRAZolam (XANAX) 0.5 mg tablet   2. Essential hypertension I10 401.9    3. [facility] pulmonary disease, unspecified [facility] J44.9 496    4. SVT (supraventricular tachycardia) ([facility]) I47.1 427.89      Orders Placed This Encounter    REFERRAL TO PSYCHIATRY     Referral Priority:   Routine     Referral Type:   Behavioral Health     Referral Reason:   Specialty Services Required     Number of Visits Requested:   1    ALPRAZolam (XANAX) 0.5 mg tablet     Sig: Take 1 Tab by mouth two (2) times a day. Max Daily Amount: 1 mg. Dispense:  60 Tab     Refill:  0     lose weight, increase physical activity, follow low fat diet, follow low salt diet, continue present plan,Take 81mg aspirin daily  Patient Instructions   [employer] Activation    Thank you for requesting access to [employer]. Please follow the instructions below to securely access and download your online medical record. [employer] allows you to send messages to your doctor, view your test results, renew your prescriptions, schedule appointments, and more. How Do I Sign Up? 1. In your internet browser, go to [email or web address]  2. Click on the First Time User? Click Here link in the Sign In box. You will be redirect to the New Member Sign Up page. 3. Enter your [employer] Access Code exactly as it appears below. You will not need to use this code after youve completed the sign-up process. If you do not sign up before the expiration date, you must request a new code. [employer] Access Code:  Activation code not generated  Current [employer] Status: Patient Declined (This is the date your [employer] access code will )    4. Enter the last four digits of your Social Security Number (xxxx) and Date of Birth (mm/dd/yyyy) as indicated and click Submit. You will be taken to the next sign-up page. 5. Create a [employer] ID. This will be your [employer] login ID and cannot be changed, so think of one that is secure and easy to remember. 6. Create a [employer] password. You can change your password at any time. 7. Enter your Password Reset Question and Answer. This can be used at a later time if you forget your password. 8. Enter your e-mail address. You will receive e-mail notification when new information is available in [address] 9. Click Sign Up. You can now view and download portions of your medical record. 10. Click the Download Summary menu link to download a portable copy of your medical information. Additional Information    If you have questions, please visit the Frequently Asked Questions section of the [employer] website at [email or web address] Remember, [employer] is NOT to be used for urgent needs. For medical emergencies, dial 911. Follow-up Disposition:  Return in about 3 months (around 3/17/2019), or if symptoms worsen or fail to improve. I have reviewed with the patient details of the assessment and plan and all questions were answered. Relevent patient education was performed. The most recent lab findings were reviewed with the patient. An After Visit Summary was printed and given to the patient.

## 2018-12-17 NOTE — PROGRESS NOTES
1. Have you been to the ER, urgent care clinic since your last visit? Hospitalized since your last visit?no    2. Have you seen or consulted any other health care providers outside of the [address] [place] [address] since your last visit? Include any pap smears or colon screening.  No    PHQ over the last two weeks 3/27/2018   PHQ Not Done -   Little interest or pleasure in doing things Several days   Feeling down, depressed, irritable, or hopeless Several days   Total Score PHQ 2 2   Trouble falling or staying asleep, or sleeping too much -   Feeling tired or having little energy -   Poor appetite, weight loss, or overeating -   Feeling bad about yourself - or that you are a failure or have let yourself or your family down -   Moving or speaking so slowly that other people could have noticed; or the opposite being so fidgety that others notice -   Thoughts of being better off dead, or hurting yourself in some way -   How difficult have these problems made it for you to do your work, take care of your home and get along with others -       Chief Complaint   Patient presents with    Hypertension    Depression

## 2018-12-17 NOTE — PATIENT INSTRUCTIONS
[employer] Activation    Thank you for requesting access to [facility]. Please follow the instructions below to securely access and download your online medical record. Delpor allows you to send messages to your doctor, view your test results, renew your prescriptions, schedule appointments, and more. How Do I Sign Up? 1. In your internet browser, go to [email or web address]  2. Click on the First Time User? Click Here link in the Sign In box. You will be redirect to the New Member Sign Up page. 3. Enter your Delpor Access Code exactly as it appears below. You will not need to use this code after youve completed the sign-up process. If you do not sign up before the expiration date, you must request a new code. Delpor Access Code: Activation code not generated  Current Delpor Status: Patient Declined (This is the date your Delpor access code will )    4. Enter the last four digits of your Social Security Number (xxxx) and Date of Birth (mm/dd/yyyy) as indicated and click Submit. You will be taken to the next sign-up page. 5. Create a Delpor ID. This will be your Delpor login ID and cannot be changed, so think of one that is secure and easy to remember. 6. Create a Delpor password. You can change your password at any time. 7. Enter your Password Reset Question and Answer. This can be used at a later time if you forget your password. 8. Enter your e-mail address. You will receive e-mail notification when new information is available in [address] 9. Click Sign Up. You can now view and download portions of your medical record. 10. Click the Download Summary menu link to download a portable copy of your medical information. Additional Information    If you have questions, please visit the Frequently Asked Questions section of the Delpor website at [email or web address] Remember, Delpor is NOT to be used for urgent needs. For medical emergencies, dial 911.

## 2018-12-18 RX ORDER — BUDESONIDE AND FORMOTEROL FUMARATE DIHYDRATE 160; 4.5 UG/1; UG/1
2 AEROSOL RESPIRATORY (INHALATION) 2 TIMES DAILY
Qty: 1 INHALER | Refills: 4 | Status: SHIPPED | OUTPATIENT
Start: 2018-12-18 | End: 2019-04-16 | Stop reason: SDUPTHER

## 2018-12-18 RX ORDER — DILTIAZEM HYDROCHLORIDE 240 MG/1
240 CAPSULE, COATED, EXTENDED RELEASE ORAL DAILY
Qty: 30 CAP | Refills: 0 | Status: SHIPPED | OUTPATIENT
Start: 2018-12-18 | End: 2019-01-24 | Stop reason: SDUPTHER

## 2018-12-18 RX ORDER — HYDROCHLOROTHIAZIDE 25 MG/1
25 TABLET ORAL DAILY
Qty: 30 TAB | Refills: 12 | Status: SHIPPED | OUTPATIENT
Start: 2018-12-18 | End: 2019-06-21 | Stop reason: SDUPTHER

## 2018-12-18 RX ORDER — CANDESARTAN 32 MG/1
32 TABLET ORAL DAILY
Qty: 30 TAB | Refills: 0 | Status: SHIPPED | OUTPATIENT
Start: 2018-12-18 | End: 2019-06-24 | Stop reason: SDUPTHER

## 2019-01-18 ENCOUNTER — OFFICE VISIT (OUTPATIENT)
Dept: INTERNAL MEDICINE CLINIC | Age: 49
End: 2019-01-18

## 2019-01-18 VITALS
DIASTOLIC BLOOD PRESSURE: 70 MMHG | SYSTOLIC BLOOD PRESSURE: 120 MMHG | OXYGEN SATURATION: 98 % | HEIGHT: 67 IN | WEIGHT: 188 LBS | HEART RATE: 74 BPM | BODY MASS INDEX: 29.51 KG/M2 | RESPIRATION RATE: 19 BRPM | TEMPERATURE: 97.6 F

## 2019-01-18 DIAGNOSIS — F41.9 ANXIETY AND DEPRESSION: ICD-10-CM

## 2019-01-18 DIAGNOSIS — F32.A ANXIETY AND DEPRESSION: ICD-10-CM

## 2019-01-18 RX ORDER — ALPRAZOLAM 0.5 MG/1
0.5 TABLET ORAL 2 TIMES DAILY
Qty: 60 TAB | Refills: 0 | Status: SHIPPED | OUTPATIENT
Start: 2019-01-18 | End: 2019-03-04 | Stop reason: SDUPTHER

## 2019-01-18 NOTE — PATIENT INSTRUCTIONS
[email or web address] Activation    Thank you for requesting access to ITeam. Please follow the instructions below to securely access and download your online medical record. ITeam allows you to send messages to your doctor, view your test results, renew your prescriptions, schedule appointments, and more. How Do I Sign Up? 1. In your internet browser, go to [email or web address]  2. Click on the First Time User? Click Here link in the Sign In box. You will be redirect to the New Member Sign Up page. 3. Enter your ITeam Access Code exactly as it appears below. You will not need to use this code after youve completed the sign-up process. If you do not sign up before the expiration date, you must request a new code. ITeam Access Code: Activation code not generated  Current ITeam Status: Patient Declined (This is the date your ITeam access code will )    4. Enter the last four digits of your Social Security Number (xxxx) and Date of Birth (mm/dd/yyyy) as indicated and click Submit. You will be taken to the next sign-up page. 5. Create a ITeam ID. This will be your ITeam login ID and cannot be changed, so think of one that is secure and easy to remember. 6. Create a ITeam password. You can change your password at any time. 7. Enter your Password Reset Question and Answer. This can be used at a later time if you forget your password. 8. Enter your e-mail address. You will receive e-mail notification when new information is available in [address] 9. Click Sign Up. You can now view and download portions of your medical record. 10. Click the Download Summary menu link to download a portable copy of your medical information. Additional Information    If you have questions, please visit the Frequently Asked Questions section of the ITeam website at [email or web address] Remember, ITeam is NOT to be used for urgent needs. For medical emergencies, dial 911.

## 2019-01-18 NOTE — PROGRESS NOTES
1. Have you been to the ER, urgent care clinic since your last visit? Hospitalized since your last visit?no    2. Have you seen or consulted any other health care providers outside of the [address] [place] [address] since your last visit? Include any pap smears or colon screening.  No    PHQ over the last two weeks 1/18/2019   PHQ Not Done -   Little interest or pleasure in doing things Not at all   Feeling down, depressed, irritable, or hopeless Not at all   Total Score PHQ 2 0   Trouble falling or staying asleep, or sleeping too much -   Feeling tired or having little energy -   Poor appetite, weight loss, or overeating -   Feeling bad about yourself - or that you are a failure or have let yourself or your family down -   Moving or speaking so slowly that other people could have noticed; or the opposite being so fidgety that others notice -   Thoughts of being better off dead, or hurting yourself in some way -   How difficult have these problems made it for you to do your work, take care of your home and get along with others -

## 2019-01-18 NOTE — PROGRESS NOTES
Alexandra Lewis is a 57 y.o. female and presents with Anxiety  . Subjective:        Hypertension Review:  The patient has essential hypertension  Diet and Lifestyle: generally follows a  low sodium diet, exercises sporadically  Home BP Monitoring: is not measured at home. Pertinent ROS:not  taking medications as instructed, no medication side effects noted, no TIA's, no chest pain on exertion, no dyspnea on exertion, no swelling of ankles. COPD REVIEW:  The patient is being seen for follow up of COPD,the patient has been stable  Oxygen: She currently is not on home oxygen therapy. Symptoms: chronic dyspnea: severity = not present: course of sx: stable. Patient uses 2 pillows at night. Patient does continue to smoke cigarettes. She was treated for a SVT while in the hospital.and states she has not had any frequent occurrence reported         Anxiety review:  Patient complains of palpitations, chest pain, paresthesias, insomnia, racing thoughts, feelings of losing control, difficulty concentrating has improved,she has a history of panic attacks with her most recent attack on [place]  Treatment includes Xanax and no other therapies. She denies recurrent thoughts of death and suicidal thoughts without plan. She experiences the following side effects from the treatment: none. Review of Systems  Constitutional: negative for fevers, chills, anorexia and weight loss  Eyes:   negative for visual disturbance and irritation  ENT:   negative for tinnitus,sore throat,nasal congestion,ear pains. hoarseness  Respiratory:  negative for cough, hemoptysis, dyspnea,wheezing  CV:   negative for chest pain, palpitations, lower extremity edema  GI:   negative for nausea, vomiting, diarrhea, abdominal pain,melena  Endo:               negative for polyuria,polydipsia,polyphagia,heat intolerance  Genitourinary: negative for frequency, dysuria and hematuria  Integument:  negative for rash and pruritus  Hematologic:  negative for easy bruising and gum/nose bleeding  Musculoskel: negative for myalgias, arthralgias, back pain, muscle weakness, joint pain  Neurological:  negative for headaches, dizziness, vertigo, memory problems and gait   Behavl/Psych: feelings of anxiety, depression, mood changes reported. Past Medical History:   Diagnosis Date    Anemia 2012    Essential hypertension     HX OTHER MEDICAL     2009 baby put up for adoption, tested + for cocaine with that pregnancy    Hypertension     SVT (supraventricular tachycardia) ([facility] [address] 2018     Past Surgical History:   Procedure Laterality Date    HX CHOLECYSTECTOMY      HX GYN           Social History     Socioeconomic History    Marital status: LEGALLY      Spouse name: Not on file    Number of children: Not on file    Years of education: Not on file    Highest education level: Not on file   Tobacco Use    Smoking status: Current Every Day Smoker     Packs/day: 0.50    Smokeless tobacco: Never Used   Substance and Sexual Activity    Alcohol use: Yes    Drug use: No    Sexual activity: Yes     Birth control/protection: None     Family History   Problem Relation Age of Onset    Hypertension Father      Current Outpatient Medications   Medication Sig Dispense Refill    ALPRAZolam (XANAX) 0.5 mg tablet Take 1 Tab by mouth two (2) times a day. Max Daily Amount: 1 mg. 60 Tab 0    candesartan (ATACAND) 32 mg tablet Take 1 Tab by mouth daily. 30 Tab 0    hydroCHLOROthiazide (HYDRODIURIL) 25 mg tablet Take 1 Tab by mouth daily. 30 Tab 12    budesonide-formoterol (SYMBICORT) 160-4.5 mcg/actuation HFAA Take 2 Puffs by inhalation two (2) times a day. 1 Inhaler 4    potassium chloride (KLOR-CON) 10 mEq tablet Take 2 Tabs by mouth daily.  39 Tab 12     Allergies   Allergen Reactions    Hydralazine Unknown (comments)     jitteriness    Hydromorphone Itching     morphine    Morphine Itching    Sulfa (Sulfonamide Antibiotics) Nausea and Vomiting       Objective:  Visit Vitals  /70 (BP 1 Location: Right arm, BP Patient Position: Sitting)   Pulse 74   Temp 97.6 °F (36.4 °C) (Oral)   Resp 19   Ht 5' 7\" (1.702 m)   Wt 188 lb (85.3 kg)   SpO2 98%   BMI 29.44 kg/m²     Physical Exam:   General appearance - alert, well appearing, and in no distress  Mental status - alert, oriented to person, place, and time  EYE-ROCKY, EOMI, corneas normal, no foreign bodies  ENT-ENT exam normal, no neck nodes or sinus tenderness  Nose - normal and patent, no erythema, discharge or polyps  Mouth - mucous membranes moist, pharynx normal without lesions  Neck - supple, no significant adenopathy   Chest - clear to auscultation, no wheezes, rales or rhonchi, symmetric air entry   Heart - normal rate, regular rhythm, normal S1, S2, no murmurs, rubs, clicks or gallops   Abdomen - soft, nontender, nondistended, no masses or organomegaly  Lymph- no adenopathy palpable  Ext-peripheral pulses normal, no pedal edema, no clubbing or cyanosis  Skin-Warm and dry.  no hyperpigmentation, vitiligo, or suspicious lesions  Neuro -alert, oriented, normal speech, no focal findings or movement disorder noted  Neck-normal C-spine, no tenderness, full ROM without pain  Feet-no nail deformities or callus formation with good pulses noted      Results for orders placed or performed during the hospital encounter of 11/16/18   CULTURE, URINE   Result Value Ref Range    Special Requests: NO SPECIAL REQUESTS      [place] Count <10,000  COLONIES/mL        Culture result: NO SIGNIFICANT GROWTH     CULTURE, BLOOD, PAIRED   Result Value Ref Range    Special Requests: NO SPECIAL REQUESTS      Culture result: NO GROWTH 5 DAYS     CBC WITH AUTOMATED DIFF   Result Value Ref Range    WBC 23.6 (H) 3.6 - 11.0 K/uL    RBC 4.50 3.80 - 5.20 M/uL    HGB 14.1 11.5 - 16.0 g/dL    HCT 41.1 35.0 - 47.0 %    MCV 91.3 80.0 - 99.0 FL    MCH 31.3 26.0 - 34.0 PG    MCHC 34.3 30.0 - 36.5 g/dL    RDW 13.6 11.5 - 14.5 %    PLATELET 835 132 - 344 K/uL    MPV 11.4 8.9 - 12.9 FL    NRBC 0.0 0  WBC    ABSOLUTE NRBC 0.00 0.00 - 0.01 K/uL    NEUTROPHILS 83 (H) 32 - 75 %    LYMPHOCYTES 10 (L) 12 - 49 %    MONOCYTES 6 5 - 13 %    EOSINOPHILS 0 0 - 7 %    BASOPHILS 0 0 - 1 %    IMMATURE GRANULOCYTES 1 (H) 0.0 - 0.5 %    ABS. NEUTROPHILS 19.6 (H) 1.8 - 8.0 K/UL    ABS. LYMPHOCYTES 2.4 0.8 - 3.5 K/UL    ABS. MONOCYTES 1.3 (H) 0.0 - 1.0 K/UL    ABS. EOSINOPHILS 0.1 0.0 - 0.4 K/UL    ABS. BASOPHILS 0.1 0.0 - 0.1 K/UL    ABS. IMM. GRANS. 0.2 (H) 0.00 - 0.04 K/UL    DF AUTOMATED     METABOLIC PANEL, COMPREHENSIVE   Result Value Ref Range    Sodium 134 (L) 136 - 145 mmol/L    Potassium 2.8 (L) 3.5 - 5.1 mmol/L    Chloride 99 97 - 108 mmol/L    CO2 26 21 - 32 mmol/L    Anion gap 9 5 - 15 mmol/L    Glucose 142 (H) 65 - 100 mg/dL    BUN 11 6 - 20 MG/DL    Creatinine 1.15 (H) 0.55 - 1.02 MG/DL    BUN/Creatinine ratio 10 (L) 12 - 20      GFR est AA >60 >60 ml/min/1.73m2    GFR est non-AA 51 (L) >60 ml/min/1.73m2    Calcium 8.6 8.5 - 10.1 MG/DL    Bilirubin, total 0.6 0.2 - 1.0 MG/DL    ALT (SGPT) 35 12 - 78 U/L    AST (SGOT) 21 15 - 37 U/L    Alk.  phosphatase 80 45 - 117 U/L    Protein, total 8.3 (H) 6.4 - 8.2 g/dL    Albumin 4.0 3.5 - 5.0 g/dL    Globulin 4.3 (H) 2.0 - 4.0 g/dL    A-G Ratio 0.9 (L) 1.1 - 2.2     NT-PRO BNP   Result Value Ref Range    NT pro-BNP 3,353 (H) 0 - 125 PG/ML   TROPONIN I   Result Value Ref Range    Troponin-I, Qt. 0.05 (H) <0.05 ng/mL   URINALYSIS W/ RFLX MICROSCOPIC   Result Value Ref Range    Color YELLOW/STRAW      Appearance CLOUDY (A) CLEAR      Specific gravity 1.006 1.003 - 1.030      pH (UA) 5.5 5.0 - 8.0      Protein 30 (A) NEG mg/dL    Glucose NEGATIVE  NEG mg/dL    Ketone NEGATIVE  NEG mg/dL    Bilirubin NEGATIVE  NEG      Blood SMALL (A) NEG      Urobilinogen 0.2 0.2 - 1.0 EU/dL    Nitrites NEGATIVE  NEG      Leukocyte Esterase LARGE (A) NEG      WBC  0 - 4 /hpf RBC 5-10 0 - 5 /hpf    Epithelial cells MANY (A) FEW /lpf    Bacteria NEGATIVE  NEG /hpf    Hyaline cast 0-2 0 - 5 /lpf   MAGNESIUM   Result Value Ref Range    Magnesium 1.8 1.6 - 2.4 mg/dL   TROPONIN I   Result Value Ref Range    Troponin-I, Qt. <0.05 <0.05 ng/mL   TROPONIN I   Result Value Ref Range    Troponin-I, Qt. <0.05 <0.55 ng/mL   METABOLIC PANEL, BASIC   Result Value Ref Range    Sodium 135 (L) 136 - 145 mmol/L    Potassium 3.1 (L) 3.5 - 5.1 mmol/L    Chloride 102 97 - 108 mmol/L    CO2 23 21 - 32 mmol/L    Anion gap 10 5 - 15 mmol/L    Glucose 234 (H) 65 - 100 mg/dL    BUN 20 6 - 20 MG/DL    Creatinine 1.42 (H) 0.55 - 1.02 MG/DL    BUN/Creatinine ratio 14 12 - 20      GFR est AA 48 (L) >60 ml/min/1.73m2    GFR est non-AA 40 (L) >60 ml/min/1.73m2    Calcium 9.4 8.5 - 10.1 MG/DL   CBC WITH AUTOMATED DIFF   Result Value Ref Range    WBC 20.6 (H) 3.6 - 11.0 K/uL    RBC 4.62 3.80 - 5.20 M/uL    HGB 14.3 11.5 - 16.0 g/dL    HCT 42.2 35.0 - 47.0 %    MCV 91.3 80.0 - 99.0 FL    MCH 31.0 26.0 - 34.0 PG    MCHC 33.9 30.0 - 36.5 g/dL    RDW 13.3 11.5 - 14.5 %    PLATELET 096 530 - 706 K/uL    MPV 11.4 8.9 - 12.9 FL    NRBC 0.0 0  WBC    ABSOLUTE NRBC 0.00 0.00 - 0.01 K/uL    NEUTROPHILS 92 (H) 32 - 75 %    LYMPHOCYTES 5 (L) 12 - 49 %    MONOCYTES 2 (L) 5 - 13 %    EOSINOPHILS 0 0 - 7 %    BASOPHILS 0 0 - 1 %    IMMATURE GRANULOCYTES 1 (H) 0.0 - 0.5 %    ABS. NEUTROPHILS 19.0 (H) 1.8 - 8.0 K/UL    ABS. LYMPHOCYTES 1.0 0.8 - 3.5 K/UL    ABS. MONOCYTES 0.4 0.0 - 1.0 K/UL    ABS. EOSINOPHILS 0.0 0.0 - 0.4 K/UL    ABS. BASOPHILS 0.0 0.0 - 0.1 K/UL    ABS. IMM.  GRANS. 0.2 (H) 0.00 - 0.04 K/UL    DF AUTOMATED      RBC COMMENTS NORMOCYTIC, NORMOCHROMIC     TSH 3RD GENERATION   Result Value Ref Range    TSH 0.35 (L) 0.36 - 3.74 uIU/mL   CBC WITH AUTOMATED DIFF   Result Value Ref Range    WBC 25.0 (H) 3.6 - 11.0 K/uL    RBC 4.28 3.80 - 5.20 M/uL    HGB 13.2 11.5 - 16.0 g/dL    HCT 40.2 35.0 - 47.0 %    MCV 93.9 80.0 - 99.0 FL    MCH 30.8 26.0 - 34.0 PG    MCHC 32.8 30.0 - 36.5 g/dL    RDW 13.7 11.5 - 14.5 %    PLATELET 779 135 - 133 K/uL    MPV 11.5 8.9 - 12.9 FL    NRBC 0.0 0  WBC    ABSOLUTE NRBC 0.00 0.00 - 0.01 K/uL    NEUTROPHILS 90 (H) 32 - 75 %    LYMPHOCYTES 5 (L) 12 - 49 %    MONOCYTES 3 (L) 5 - 13 %    EOSINOPHILS 0 0 - 7 %    BASOPHILS 0 0 - 1 %    IMMATURE GRANULOCYTES 1 (H) 0.0 - 0.5 %    ABS. NEUTROPHILS 22.6 (H) 1.8 - 8.0 K/UL    ABS. LYMPHOCYTES 1.3 0.8 - 3.5 K/UL    ABS. MONOCYTES 0.8 0.0 - 1.0 K/UL    ABS. EOSINOPHILS 0.0 0.0 - 0.4 K/UL    ABS. BASOPHILS 0.0 0.0 - 0.1 K/UL    ABS. IMM. GRANS. 0.2 (H) 0.00 - 0.04 K/UL    DF AUTOMATED     METABOLIC PANEL, BASIC   Result Value Ref Range    Sodium 139 136 - 145 mmol/L    Potassium 3.8 3.5 - 5.1 mmol/L    Chloride 103 97 - 108 mmol/L    CO2 29 21 - 32 mmol/L    Anion gap 7 5 - 15 mmol/L    Glucose 162 (H) 65 - 100 mg/dL    BUN 28 (H) 6 - 20 MG/DL    Creatinine 1.43 (H) 0.55 - 1.02 MG/DL    BUN/Creatinine ratio 20 12 - 20      GFR est AA 48 (L) >60 ml/min/1.73m2    GFR est non-AA 40 (L) >60 ml/min/1.73m2    Calcium 9.6 8.5 - 10.1 MG/DL   MAGNESIUM   Result Value Ref Range    Magnesium 2.1 1.6 - 2.4 mg/dL   PHOSPHORUS   Result Value Ref Range    Phosphorus 3.1 2.6 - 4.7 MG/DL   CBC WITH AUTOMATED DIFF   Result Value Ref Range    WBC 18.6 (H) 3.6 - 11.0 K/uL    RBC 4.35 3.80 - 5.20 M/uL    HGB 13.5 11.5 - 16.0 g/dL    HCT 40.5 35.0 - 47.0 %    MCV 93.1 80.0 - 99.0 FL    MCH 31.0 26.0 - 34.0 PG    MCHC 33.3 30.0 - 36.5 g/dL    RDW 13.4 11.5 - 14.5 %    PLATELET 202 431 - 760 K/uL    MPV 11.1 8.9 - 12.9 FL    NRBC 0.0 0  WBC    ABSOLUTE NRBC 0.00 0.00 - 0.01 K/uL    NEUTROPHILS 81 (H) 32 - 75 %    LYMPHOCYTES 13 12 - 49 %    MONOCYTES 6 5 - 13 %    EOSINOPHILS 0 0 - 7 %    BASOPHILS 0 0 - 1 %    IMMATURE GRANULOCYTES 1 (H) 0.0 - 0.5 %    ABS. NEUTROPHILS 15.0 (H) 1.8 - 8.0 K/UL    ABS. LYMPHOCYTES 2.4 0.8 - 3.5 K/UL    ABS.  MONOCYTES 1.0 0.0 - 1.0 K/UL ABS. EOSINOPHILS 0.0 0.0 - 0.4 K/UL    ABS. BASOPHILS 0.0 0.0 - 0.1 K/UL    ABS. IMM.  GRANS. 0.1 (H) 0.00 - 0.04 K/UL    DF AUTOMATED     METABOLIC PANEL, BASIC   Result Value Ref Range    Sodium 139 136 - 145 mmol/L    Potassium 3.8 3.5 - 5.1 mmol/L    Chloride 104 97 - 108 mmol/L    CO2 27 21 - 32 mmol/L    Anion gap 8 5 - 15 mmol/L    Glucose 229 (H) 65 - 100 mg/dL    BUN 30 (H) 6 - 20 MG/DL    Creatinine 1.35 (H) 0.55 - 1.02 MG/DL    BUN/Creatinine ratio 22 (H) 12 - 20      GFR est AA 51 (L) >60 ml/min/1.73m2    GFR est non-AA 42 (L) >60 ml/min/1.73m2    Calcium 9.0 8.5 - 10.1 MG/DL   MAGNESIUM   Result Value Ref Range    Magnesium 2.1 1.6 - 2.4 mg/dL   PHOSPHORUS   Result Value Ref Range    Phosphorus 3.5 2.6 - 4.7 MG/DL   HCG URINE, QL. - POC   Result Value Ref Range    Pregnancy test,urine (POC) NEGATIVE  NEG     GLUCOSE, POC   Result Value Ref Range    Glucose (POC) 182 (H) 65 - 100 mg/dL    Performed by Christi Munoz (PCT)    GLUCOSE, POC   Result Value Ref Range    Glucose (POC) 212 (H) 65 - 100 mg/dL    Performed by Dana    EKG, 12 LEAD, INITIAL   Result Value Ref Range    Ventricular Rate 99 BPM    Atrial Rate 99 BPM    P-R Interval 134 ms    QRS Duration 92 ms    Q-T Interval 372 ms    QTC Calculation (Bezet) 477 ms    Calculated P Axis 51 degrees    Calculated R Axis 4 degrees    Calculated T Axis -178 degrees    Diagnosis       Sinus rhythm with occasional premature ventricular complexes  Biatrial enlargement  ST & T wave abnormality, consider inferolateral ischemia  Prolonged QT    Confirmed by Som, P.V. ([id number]) on 11/16/2018 9:12:29 AM     EKG, 12 LEAD, INITIAL   Result Value Ref Range    Ventricular Rate 159 BPM    Atrial Rate 159 BPM    P-R Interval 170 ms    QRS Duration 90 ms    Q-T Interval 308 ms    QTC Calculation (Bezet) 501 ms    Calculated P Axis 7 degrees    Calculated R Axis 8 degrees    Calculated T Axis -154 degrees    Diagnosis       Probable Supraventricular tachycardia  Cannot rule out Sinus tachycardia  Non-specific ST & T wave changes  Confirmed by Ryne Ferrari ([id number]) on 2018 10:32:13 PM         Assessment/Plan:    ICD-10-CM ICD-9-CM    1. Anxiety and depression F41.9 300.00 ALPRAZolam (XANAX) 0.5 mg tablet    F32.9 311      Orders Placed This Encounter    ALPRAZolam (XANAX) 0.5 mg tablet     Sig: Take 1 Tab by mouth two (2) times a day. Max Daily Amount: 1 mg. Dispense:  60 Tab     Refill:  0     lose weight, increase physical activity, follow low fat diet, follow low salt diet, continue present plan,Take 81mg aspirin daily  Patient Instructions   [employer] Activation    Thank you for requesting access to [facility]. Please follow the instructions below to securely access and download your online medical record. [facility] allows you to send messages to your doctor, view your test results, renew your prescriptions, schedule appointments, and more. How Do I Sign Up? 1. In your internet browser, go to [email or web address]  2. Click on the First Time User? Click Here link in the Sign In box. You will be redirect to the New Member Sign Up page. 3. Enter your SergeMD Access Code exactly as it appears below. You will not need to use this code after youve completed the sign-up process. If you do not sign up before the expiration date, you must request a new code. SergeMD Access Code: Activation code not generated  Current [facility]MD Status: Patient Declined (This is the date your SergeMD access code will )    4. Enter the last four digits of your Social Security Number (xxxx) and Date of Birth (mm/dd/yyyy) as indicated and click Submit. You will be taken to the next sign-up page. 5. Create a [facility]MD ID. This will be your SergeMD login ID and cannot be changed, so think of one that is secure and easy to remember. 6. Create a SergeMD password. You can change your password at any time. 7. Enter your Password Reset Question and Answer.  This can be used at a later time if you forget your password. 8. Enter your e-mail address. You will receive e-mail notification when new information is available in [address] 9. Click Sign Up. You can now view and download portions of your medical record. 10. Click the Download Summary menu link to download a portable copy of your medical information. Additional Information    If you have questions, please visit the Frequently Asked Questions section of the [email or web address] website at [email or web address] Remember, [email or web address] is NOT to be used for urgent needs. For medical emergencies, dial 911. Follow-up Disposition:  Return in about 3 months (around 4/18/2019), or if symptoms worsen or fail to improve. I have reviewed with the patient details of the assessment and plan and all questions were answered. Relevent patient education was performed. The most recent lab findings were reviewed with the patient. An After Visit Summary was printed and given to the patient.     She is to follow up psy doctor for additional refills of xanax

## 2019-01-24 RX ORDER — DILTIAZEM HYDROCHLORIDE 240 MG/1
CAPSULE, COATED, EXTENDED RELEASE ORAL
Qty: 30 CAP | Refills: 0 | Status: SHIPPED | OUTPATIENT
Start: 2019-01-24 | End: 2019-06-21

## 2019-03-04 ENCOUNTER — OFFICE VISIT (OUTPATIENT)
Dept: INTERNAL MEDICINE CLINIC | Age: 49
End: 2019-03-04

## 2019-03-04 VITALS
SYSTOLIC BLOOD PRESSURE: 118 MMHG | TEMPERATURE: 98 F | HEART RATE: 146 BPM | OXYGEN SATURATION: 96 % | DIASTOLIC BLOOD PRESSURE: 90 MMHG | WEIGHT: 191 LBS | BODY MASS INDEX: 29.98 KG/M2 | HEIGHT: 67 IN | RESPIRATION RATE: 20 BRPM

## 2019-03-04 DIAGNOSIS — F41.9 ANXIETY AND DEPRESSION: ICD-10-CM

## 2019-03-04 DIAGNOSIS — F32.A ANXIETY AND DEPRESSION: ICD-10-CM

## 2019-03-04 RX ORDER — ALPRAZOLAM 0.5 MG/1
0.5 TABLET ORAL 2 TIMES DAILY
Qty: 60 TAB | Refills: 0 | Status: SHIPPED | OUTPATIENT
Start: 2019-03-04 | End: 2019-04-12 | Stop reason: SDUPTHER

## 2019-03-04 RX ORDER — CARVEDILOL 3.12 MG/1
3.12 TABLET ORAL 2 TIMES DAILY WITH MEALS
Qty: 60 TAB | Refills: 6 | Status: SHIPPED | OUTPATIENT
Start: 2019-03-04 | End: 2019-06-21

## 2019-03-04 NOTE — PROGRESS NOTES
Lion Keller is a 57 y.o. female and presents with Hypertension  . Subjective: Anxiety review:  Patient complains of palpitations, chest pain, paresthesias, insomnia, racing thoughts, feelings of losing control, difficulty concentrating has improved,she has a history of panic attacks Treatment includes Xanax and no other therapies. She denies recurrent thoughts of death and suicidal thoughts without plan. She experiences the following side effects from the treatment: none. She has been having increasing palpitations reported  She was treated for a SVT while in the hospital.  She has seen a therapist and states she has a psy referral soon. Hypertension Review:  The patient has essential hypertension  Diet and Lifestyle: generally follows a  low sodium diet, exercises sporadically  Home BP Monitoring: is not measured at home. Pertinent ROS:not  taking medications as instructed, no medication side effects noted, no TIA's, no chest pain on exertion, no dyspnea on exertion, no swelling of ankles. COPD REVIEW:  The patient is being seen for follow up of COPD,the patient has been stable  Oxygen: She currently is not on home oxygen therapy. Symptoms: chronic dyspnea: severity = not present: course of sx: stable. Patient uses 2 pillows at night. Patient does continue to smoke cigarettes. Review of Systems  Constitutional: negative for fevers, chills, anorexia and weight loss  Eyes:   negative for visual disturbance and irritation  ENT:   negative for tinnitus,sore throat,nasal congestion,ear pains. hoarseness  Respiratory:  negative for cough, hemoptysis, dyspnea,wheezing  CV:   negative for chest pain, palpitations, lower extremity edema  GI:   negative for nausea, vomiting, diarrhea, abdominal pain,melena  Endo:               negative for polyuria,polydipsia,polyphagia,heat intolerance  Genitourinary: negative for frequency, dysuria and hematuria  Integument:  negative for rash and pruritus  Hematologic:  negative for easy bruising and gum/nose bleeding  Musculoskel: negative for myalgias, arthralgias, back pain, muscle weakness, joint pain  Neurological:  negative for headaches, dizziness, vertigo, memory problems and gait   Behavl/Psych: feelings of anxiety, depression, mood changes reported. Past Medical History:   Diagnosis Date    Anemia 2012    Essential hypertension     HX OTHER MEDICAL     2009 baby put up for adoption, tested + for cocaine with that pregnancy    Hypertension     SVT (supraventricular tachycardia) ([facility] [address] 2018     Past Surgical History:   Procedure Laterality Date    HX CHOLECYSTECTOMY      HX GYN           Social History     Socioeconomic History    Marital status: LEGALLY      Spouse name: Not on file    Number of children: Not on file    Years of education: Not on file    Highest education level: Not on file   Tobacco Use    Smoking status: Current Every Day Smoker     Packs/day: 0.50    Smokeless tobacco: Never Used   Substance and Sexual Activity    Alcohol use: Yes    Drug use: No    Sexual activity: Yes     Birth control/protection: None     Family History   Problem Relation Age of Onset    Hypertension Father      Current Outpatient Medications   Medication Sig Dispense Refill    carvedilol (COREG) 3.125 mg tablet Take 1 Tab by mouth two (2) times daily (with meals). 60 Tab 6    ALPRAZolam (XANAX) 0.5 mg tablet Take 1 Tab by mouth two (2) times a day. Max Daily Amount: 1 mg. 60 Tab 0    candesartan (ATACAND) 32 mg tablet Take 1 Tab by mouth daily. 30 Tab 0    hydroCHLOROthiazide (HYDRODIURIL) 25 mg tablet Take 1 Tab by mouth daily. 30 Tab 12    dilTIAZem CD (CARDIZEM CD) 240 mg ER capsule TAKE ONE CAPSULE BY MOUTH EVERY DAY 30 Cap 0    budesonide-formoterol (SYMBICORT) 160-4.5 mcg/actuation HFAA Take 2 Puffs by inhalation two (2) times a day.  1 Inhaler 4    potassium chloride (KLOR-CON) 10 mEq tablet Take 2 Tabs by mouth daily. 39 Tab 12     Allergies   Allergen Reactions    Hydralazine Unknown (comments)     jitteriness    Hydromorphone Itching     morphine    Morphine Itching    Sulfa (Sulfonamide Antibiotics) Nausea and Vomiting       Objective:  Visit Vitals  /90   Pulse (!) 146   Temp 98 °F (36.7 °C)   Resp 20   Ht 5' 7\" (1.702 m)   Wt 191 lb (86.6 kg)   SpO2 96%   BMI 29.91 kg/m²     Physical Exam:   General appearance - alert, well appearing, and in no distress  Mental status - alert, oriented to person, place, and time  EYE-ROCKY, EOMI, corneas normal, no foreign bodies  ENT-ENT exam normal, no neck nodes or sinus tenderness  Nose - normal and patent, no erythema, discharge or polyps  Mouth - mucous membranes moist, pharynx normal without lesions  Neck - supple, no significant adenopathy   Chest - clear to auscultation, no wheezes, rales or rhonchi, symmetric air entry   Heart - normal rate, regular rhythm, normal S1, S2, no murmurs, rubs, clicks or gallops   Abdomen - soft, nontender, nondistended, no masses or organomegaly  Lymph- no adenopathy palpable  Ext-peripheral pulses normal, no pedal edema, no clubbing or cyanosis  Skin-Warm and dry.  no hyperpigmentation, vitiligo, or suspicious lesions  Neuro -alert, oriented, normal speech, no focal findings or movement disorder noted  Neck-normal C-spine, no tenderness, full ROM without pain  Feet-no nail deformities or callus formation with good pulses noted      Results for orders placed or performed during the hospital encounter of 11/16/18   CULTURE, URINE   Result Value Ref Range    Special Requests: NO SPECIAL REQUESTS      Joes Count <10,000  COLONIES/mL        Culture result: NO SIGNIFICANT GROWTH     CULTURE, BLOOD, PAIRED   Result Value Ref Range    Special Requests: NO SPECIAL REQUESTS      Culture result: NO GROWTH 5 DAYS     CBC WITH AUTOMATED DIFF   Result Value Ref Range    WBC 23.6 (H) 3.6 - 11.0 K/uL    RBC 4.50 3.80 - 5.20 M/uL    HGB 14.1 11.5 - 16.0 g/dL    HCT 41.1 35.0 - 47.0 %    MCV 91.3 80.0 - 99.0 FL    MCH 31.3 26.0 - 34.0 PG    MCHC 34.3 30.0 - 36.5 g/dL    RDW 13.6 11.5 - 14.5 %    PLATELET 124 293 - 731 K/uL    MPV 11.4 8.9 - 12.9 FL    NRBC 0.0 0  WBC    ABSOLUTE NRBC 0.00 0.00 - 0.01 K/uL    NEUTROPHILS 83 (H) 32 - 75 %    LYMPHOCYTES 10 (L) 12 - 49 %    MONOCYTES 6 5 - 13 %    EOSINOPHILS 0 0 - 7 %    BASOPHILS 0 0 - 1 %    IMMATURE GRANULOCYTES 1 (H) 0.0 - 0.5 %    ABS. NEUTROPHILS 19.6 (H) 1.8 - 8.0 K/UL    ABS. LYMPHOCYTES 2.4 0.8 - 3.5 K/UL    ABS. MONOCYTES 1.3 (H) 0.0 - 1.0 K/UL    ABS. EOSINOPHILS 0.1 0.0 - 0.4 K/UL    ABS. BASOPHILS 0.1 0.0 - 0.1 K/UL    ABS. IMM. GRANS. 0.2 (H) 0.00 - 0.04 K/UL    DF AUTOMATED     METABOLIC PANEL, COMPREHENSIVE   Result Value Ref Range    Sodium 134 (L) 136 - 145 mmol/L    Potassium 2.8 (L) 3.5 - 5.1 mmol/L    Chloride 99 97 - 108 mmol/L    CO2 26 21 - 32 mmol/L    Anion gap 9 5 - 15 mmol/L    Glucose 142 (H) 65 - 100 mg/dL    BUN 11 6 - 20 MG/DL    Creatinine 1.15 (H) 0.55 - 1.02 MG/DL    BUN/Creatinine ratio 10 (L) 12 - 20      GFR est AA >60 >60 ml/min/1.73m2    GFR est non-AA 51 (L) >60 ml/min/1.73m2    Calcium 8.6 8.5 - 10.1 MG/DL    Bilirubin, total 0.6 0.2 - 1.0 MG/DL    ALT (SGPT) 35 12 - 78 U/L    AST (SGOT) 21 15 - 37 U/L    Alk.  phosphatase 80 45 - 117 U/L    Protein, total 8.3 (H) 6.4 - 8.2 g/dL    Albumin 4.0 3.5 - 5.0 g/dL    Globulin 4.3 (H) 2.0 - 4.0 g/dL    A-G Ratio 0.9 (L) 1.1 - 2.2     NT-PRO BNP   Result Value Ref Range    NT pro-BNP 3,353 (H) 0 - 125 PG/ML   TROPONIN I   Result Value Ref Range    Troponin-I, Qt. 0.05 (H) <0.05 ng/mL   URINALYSIS W/ RFLX MICROSCOPIC   Result Value Ref Range    Color YELLOW/STRAW      Appearance CLOUDY (A) CLEAR      Specific gravity 1.006 1.003 - 1.030      pH (UA) 5.5 5.0 - 8.0      Protein 30 (A) NEG mg/dL    Glucose NEGATIVE  NEG mg/dL    Ketone NEGATIVE  NEG mg/dL    Bilirubin NEGATIVE  NEG      Blood SMALL (A) NEG Urobilinogen 0.2 0.2 - 1.0 EU/dL    Nitrites NEGATIVE  NEG      Leukocyte Esterase LARGE (A) NEG      WBC  0 - 4 /hpf    RBC 5-10 0 - 5 /hpf    Epithelial cells MANY (A) FEW /lpf    Bacteria NEGATIVE  NEG /hpf    Hyaline cast 0-2 0 - 5 /lpf   MAGNESIUM   Result Value Ref Range    Magnesium 1.8 1.6 - 2.4 mg/dL   TROPONIN I   Result Value Ref Range    Troponin-I, Qt. <0.05 <0.05 ng/mL   TROPONIN I   Result Value Ref Range    Troponin-I, Qt. <0.05 <7.17 ng/mL   METABOLIC PANEL, BASIC   Result Value Ref Range    Sodium 135 (L) 136 - 145 mmol/L    Potassium 3.1 (L) 3.5 - 5.1 mmol/L    Chloride 102 97 - 108 mmol/L    CO2 23 21 - 32 mmol/L    Anion gap 10 5 - 15 mmol/L    Glucose 234 (H) 65 - 100 mg/dL    BUN 20 6 - 20 MG/DL    Creatinine 1.42 (H) 0.55 - 1.02 MG/DL    BUN/Creatinine ratio 14 12 - 20      GFR est AA 48 (L) >60 ml/min/1.73m2    GFR est non-AA 40 (L) >60 ml/min/1.73m2    Calcium 9.4 8.5 - 10.1 MG/DL   CBC WITH AUTOMATED DIFF   Result Value Ref Range    WBC 20.6 (H) 3.6 - 11.0 K/uL    RBC 4.62 3.80 - 5.20 M/uL    HGB 14.3 11.5 - 16.0 g/dL    HCT 42.2 35.0 - 47.0 %    MCV 91.3 80.0 - 99.0 FL    MCH 31.0 26.0 - 34.0 PG    MCHC 33.9 30.0 - 36.5 g/dL    RDW 13.3 11.5 - 14.5 %    PLATELET 951 185 - 710 K/uL    MPV 11.4 8.9 - 12.9 FL    NRBC 0.0 0  WBC    ABSOLUTE NRBC 0.00 0.00 - 0.01 K/uL    NEUTROPHILS 92 (H) 32 - 75 %    LYMPHOCYTES 5 (L) 12 - 49 %    MONOCYTES 2 (L) 5 - 13 %    EOSINOPHILS 0 0 - 7 %    BASOPHILS 0 0 - 1 %    IMMATURE GRANULOCYTES 1 (H) 0.0 - 0.5 %    ABS. NEUTROPHILS 19.0 (H) 1.8 - 8.0 K/UL    ABS. LYMPHOCYTES 1.0 0.8 - 3.5 K/UL    ABS. MONOCYTES 0.4 0.0 - 1.0 K/UL    ABS. EOSINOPHILS 0.0 0.0 - 0.4 K/UL    ABS. BASOPHILS 0.0 0.0 - 0.1 K/UL    ABS. IMM.  GRANS. 0.2 (H) 0.00 - 0.04 K/UL    DF AUTOMATED      RBC COMMENTS NORMOCYTIC, NORMOCHROMIC     TSH 3RD GENERATION   Result Value Ref Range    TSH 0.35 (L) 0.36 - 3.74 uIU/mL   CBC WITH AUTOMATED DIFF   Result Value Ref Range    WBC 25.0 (H) 3.6 - 11.0 K/uL    RBC 4.28 3.80 - 5.20 M/uL    HGB 13.2 11.5 - 16.0 g/dL    HCT 40.2 35.0 - 47.0 %    MCV 93.9 80.0 - 99.0 FL    MCH 30.8 26.0 - 34.0 PG    MCHC 32.8 30.0 - 36.5 g/dL    RDW 13.7 11.5 - 14.5 %    PLATELET 839 856 - 286 K/uL    MPV 11.5 8.9 - 12.9 FL    NRBC 0.0 0  WBC    ABSOLUTE NRBC 0.00 0.00 - 0.01 K/uL    NEUTROPHILS 90 (H) 32 - 75 %    LYMPHOCYTES 5 (L) 12 - 49 %    MONOCYTES 3 (L) 5 - 13 %    EOSINOPHILS 0 0 - 7 %    BASOPHILS 0 0 - 1 %    IMMATURE GRANULOCYTES 1 (H) 0.0 - 0.5 %    ABS. NEUTROPHILS 22.6 (H) 1.8 - 8.0 K/UL    ABS. LYMPHOCYTES 1.3 0.8 - 3.5 K/UL    ABS. MONOCYTES 0.8 0.0 - 1.0 K/UL    ABS. EOSINOPHILS 0.0 0.0 - 0.4 K/UL    ABS. BASOPHILS 0.0 0.0 - 0.1 K/UL    ABS. IMM.  GRANS. 0.2 (H) 0.00 - 0.04 K/UL    DF AUTOMATED     METABOLIC PANEL, BASIC   Result Value Ref Range    Sodium 139 136 - 145 mmol/L    Potassium 3.8 3.5 - 5.1 mmol/L    Chloride 103 97 - 108 mmol/L    CO2 29 21 - 32 mmol/L    Anion gap 7 5 - 15 mmol/L    Glucose 162 (H) 65 - 100 mg/dL    BUN 28 (H) 6 - 20 MG/DL    Creatinine 1.43 (H) 0.55 - 1.02 MG/DL    BUN/Creatinine ratio 20 12 - 20      GFR est AA 48 (L) >60 ml/min/1.73m2    GFR est non-AA 40 (L) >60 ml/min/1.73m2    Calcium 9.6 8.5 - 10.1 MG/DL   MAGNESIUM   Result Value Ref Range    Magnesium 2.1 1.6 - 2.4 mg/dL   PHOSPHORUS   Result Value Ref Range    Phosphorus 3.1 2.6 - 4.7 MG/DL   CBC WITH AUTOMATED DIFF   Result Value Ref Range    WBC 18.6 (H) 3.6 - 11.0 K/uL    RBC 4.35 3.80 - 5.20 M/uL    HGB 13.5 11.5 - 16.0 g/dL    HCT 40.5 35.0 - 47.0 %    MCV 93.1 80.0 - 99.0 FL    MCH 31.0 26.0 - 34.0 PG    MCHC 33.3 30.0 - 36.5 g/dL    RDW 13.4 11.5 - 14.5 %    PLATELET 203 251 - 317 K/uL    MPV 11.1 8.9 - 12.9 FL    NRBC 0.0 0  WBC    ABSOLUTE NRBC 0.00 0.00 - 0.01 K/uL    NEUTROPHILS 81 (H) 32 - 75 %    LYMPHOCYTES 13 12 - 49 %    MONOCYTES 6 5 - 13 %    EOSINOPHILS 0 0 - 7 %    BASOPHILS 0 0 - 1 %    IMMATURE GRANULOCYTES 1 (H) 0.0 - 0.5 %    ABS. NEUTROPHILS 15.0 (H) 1.8 - 8.0 K/UL    ABS. LYMPHOCYTES 2.4 0.8 - 3.5 K/UL    ABS. MONOCYTES 1.0 0.0 - 1.0 K/UL    ABS. EOSINOPHILS 0.0 0.0 - 0.4 K/UL    ABS. BASOPHILS 0.0 0.0 - 0.1 K/UL    ABS. IMM.  GRANS. 0.1 (H) 0.00 - 0.04 K/UL    DF AUTOMATED     METABOLIC PANEL, BASIC   Result Value Ref Range    Sodium 139 136 - 145 mmol/L    Potassium 3.8 3.5 - 5.1 mmol/L    Chloride 104 97 - 108 mmol/L    CO2 27 21 - 32 mmol/L    Anion gap 8 5 - 15 mmol/L    Glucose 229 (H) 65 - 100 mg/dL    BUN 30 (H) 6 - 20 MG/DL    Creatinine 1.35 (H) 0.55 - 1.02 MG/DL    BUN/Creatinine ratio 22 (H) 12 - 20      GFR est AA 51 (L) >60 ml/min/1.73m2    GFR est non-AA 42 (L) >60 ml/min/1.73m2    Calcium 9.0 8.5 - 10.1 MG/DL   MAGNESIUM   Result Value Ref Range    Magnesium 2.1 1.6 - 2.4 mg/dL   PHOSPHORUS   Result Value Ref Range    Phosphorus 3.5 2.6 - 4.7 MG/DL   HCG URINE, QL. - POC   Result Value Ref Range    Pregnancy test,urine (POC) NEGATIVE  NEG     GLUCOSE, POC   Result Value Ref Range    Glucose (POC) 182 (H) 65 - 100 mg/dL    Performed by [facility]    GLUCOSE, POC   Result Value Ref Range    Glucose (POC) 212 (H) 65 - 100 mg/dL    Performed by [facility]    EKG, 12 LEAD, INITIAL   Result Value Ref Range    Ventricular Rate 99 BPM    Atrial Rate 99 BPM    P-R Interval 134 ms    QRS Duration 92 ms    Q-T Interval 372 ms    QTC Calculation (Bezet) 477 ms    Calculated P Axis 51 degrees    Calculated R Axis 4 degrees    Calculated T Axis -178 degrees    Diagnosis       Sinus rhythm with occasional premature ventricular complexes  Biatrial enlargement  ST & T wave abnormality, consider inferolateral ischemia  Prolonged QT    Confirmed by Som, P.V. ([id number]) on 11/16/2018 9:12:29 AM     EKG, 12 LEAD, INITIAL   Result Value Ref Range    Ventricular Rate 159 BPM    Atrial Rate 159 BPM    P-R Interval 170 ms    QRS Duration 90 ms    Q-T Interval 308 ms    QTC Calculation (Bezet) 501 ms    Calculated P Axis 7 degrees    Calculated R Axis 8 degrees    Calculated T Axis -154 degrees    Diagnosis       Probable Supraventricular tachycardia  Cannot rule out Sinus tachycardia  Non-specific ST & T wave changes  Confirmed by Fariha Parada ([id number]) on 2018 10:32:13 PM         Assessment/Plan:    ICD-10-CM ICD-9-CM    1. Anxiety and depression F41.9 300.00 ALPRAZolam (XANAX) 0.5 mg tablet    F32.9 311      Orders Placed This Encounter    carvedilol (COREG) 3.125 mg tablet     Sig: Take 1 Tab by mouth two (2) times daily (with meals). Dispense:  60 Tab     Refill:  6    ALPRAZolam (XANAX) 0.5 mg tablet     Sig: Take 1 Tab by mouth two (2) times a day. Max Daily Amount: 1 mg. Dispense:  60 Tab     Refill:  0     lose weight, increase physical activity, follow low fat diet, follow low salt diet, continue present plan,Take 81mg aspirin daily  Patient Instructions   [employer] Activation    Thank you for requesting access to [facility]. Please follow the instructions below to securely access and download your online medical record. [employer]ES[employer]YOU allows you to send messages to your doctor, view your test results, renew your prescriptions, schedule appointments, and more. How Do I Sign Up? 1. In your internet browser, go to [email or web address]  2. Click on the First Time User? Click Here link in the Sign In box. You will be redirect to the New Member Sign Up page. 3. Enter your [employer]ES[employer]YOU Access Code exactly as it appears below. You will not need to use this code after youve completed the sign-up process. If you do not sign up before the expiration date, you must request a new code. [employer]ES[facility]YOU Access Code: [email or web address]  Expires: 2019  2:11 PM (This is the date your ESC[employer]ES[employer]YOU access code will )    4. Enter the last four digits of your Social Security Number (xxxx) and Date of Birth (mm/dd/yyyy) as indicated and click Submit. You will be taken to the next sign-up page. 5. Create a ESCAPESwithYOU ID.  This will be your Ther[employer]-Wave login ID and cannot be changed, so think of one that is secure and easy to remember. 6. Create a Ther[employer]-Wave password. You can change your password at any time. 7. Enter your Password Reset Question and Answer. This can be used at a later time if you forget your password. 8. Enter your e-mail address. You will receive e-mail notification when new information is available in [address] 9. Click Sign Up. You can now view and download portions of your medical record. 10. Click the Download Summary menu link to download a portable copy of your medical information. Additional Information    If you have questions, please visit the Frequently Asked Questions section of the Therma-Wave website at [email or web address] Remember, [facility] is NOT to be used for urgent needs. For medical emergencies, dial 911. Follow-up Disposition:  Return in about 4 weeks (around 4/1/2019), or if symptoms worsen or fail to improve. I have reviewed with the patient details of the assessment and plan and all questions were answered. Relevent patient education was performed. The most recent lab findings were reviewed with the patient. An After Visit Summary was printed and given to the patient.     She is to follow up psy doctor for additional refills of xanax

## 2019-03-04 NOTE — PATIENT INSTRUCTIONS
[facility] Activation    Thank you for requesting access to [facility]. Please follow the instructions below to securely access and download your online medical record. [facility] allows you to send messages to your doctor, view your test results, renew your prescriptions, schedule appointments, and more. How Do I Sign Up? 1. In your internet browser, go to [email or web address]  2. Click on the First Time User? Click Here link in the Sign In box. You will be redirect to the New Member Sign Up page. 3. Enter your [facility] Access Code exactly as it appears below. You will not need to use this code after youve completed the sign-up process. If you do not sign up before the expiration date, you must request a new code. [facility] Access Code: [email or web address]  Expires: 2019  2:11 PM (This is the date your [facility] access code will )    4. Enter the last four digits of your Social Security Number (xxxx) and Date of Birth (mm/dd/yyyy) as indicated and click Submit. You will be taken to the next sign-up page. 5. Create a [facility] ID. This will be your [facility] login ID and cannot be changed, so think of one that is secure and easy to remember. 6. Create a [facility] password. You can change your password at any time. 7. Enter your Password Reset Question and Answer. This can be used at a later time if you forget your password. 8. Enter your e-mail address. You will receive e-mail notification when new information is available in [address] 9. Click Sign Up. You can now view and download portions of your medical record. 10. Click the Download Summary menu link to download a portable copy of your medical information. Additional Information    If you have questions, please visit the Frequently Asked Questions section of the [facility] website at [email or web address] Remember, [facility] is NOT to be used for urgent needs. For medical emergencies, dial 911.

## 2019-03-04 NOTE — PROGRESS NOTES
1. Have you been to the ER, urgent care clinic since your last visit? Hospitalized since your last visit?no    2. Have you seen or consulted any other health care providers outside of the [address] [place] [address] since your last visit? Include any pap smears or colon screening. No    3 most recent PHQ Screens 1/18/2019   PHQ Not Done -   Little interest or pleasure in doing things Not at all   Feeling down, depressed, irritable, or hopeless Not at all   Total Score PHQ 2 0   Trouble falling or staying asleep, or sleeping too much -   Feeling tired or having little energy -   Poor appetite, weight loss, or overeating -   Feeling bad about yourself - or that you are a failure or have let yourself or your family down -   Moving or speaking so slowly that other people could have noticed; or the opposite being so fidgety that others notice -   Thoughts of being better off dead, or hurting yourself in some way -   How difficult have these problems made it for you to do your work, take care of your home and get along with others -     Per Dr. Clarence Christiansen.,  verbal order given for needed amb poc labs.

## 2019-03-06 DIAGNOSIS — I10 BENIGN ESSENTIAL HTN: ICD-10-CM

## 2019-03-07 RX ORDER — CANDESARTAN 32 MG/1
TABLET ORAL
Qty: 30 TAB | Refills: 0 | Status: SHIPPED | OUTPATIENT
Start: 2019-03-07 | End: 2019-06-21

## 2019-04-12 ENCOUNTER — OFFICE VISIT (OUTPATIENT)
Dept: INTERNAL MEDICINE CLINIC | Age: 49
End: 2019-04-12

## 2019-04-12 VITALS
TEMPERATURE: 98.1 F | WEIGHT: 187 LBS | RESPIRATION RATE: 16 BRPM | OXYGEN SATURATION: 95 % | HEIGHT: 67 IN | DIASTOLIC BLOOD PRESSURE: 80 MMHG | SYSTOLIC BLOOD PRESSURE: 124 MMHG | BODY MASS INDEX: 29.35 KG/M2 | HEART RATE: 80 BPM

## 2019-04-12 DIAGNOSIS — F41.9 ANXIETY AND DEPRESSION: ICD-10-CM

## 2019-04-12 DIAGNOSIS — I10 ESSENTIAL HYPERTENSION: ICD-10-CM

## 2019-04-12 DIAGNOSIS — J44.9 CHRONIC OBSTRUCTIVE PULMONARY DISEASE, UNSPECIFIED COPD TYPE (HCC): ICD-10-CM

## 2019-04-12 DIAGNOSIS — I10 BENIGN ESSENTIAL HTN: Primary | ICD-10-CM

## 2019-04-12 DIAGNOSIS — J06.9 VIRAL UPPER RESPIRATORY TRACT INFECTION: ICD-10-CM

## 2019-04-12 DIAGNOSIS — F32.A ANXIETY AND DEPRESSION: ICD-10-CM

## 2019-04-12 RX ORDER — AZITHROMYCIN 250 MG/1
TABLET, FILM COATED ORAL
Qty: 6 TAB | Refills: 0 | Status: SHIPPED | OUTPATIENT
Start: 2019-04-12 | End: 2019-05-14 | Stop reason: ALTCHOICE

## 2019-04-12 RX ORDER — ALPRAZOLAM 0.5 MG/1
TABLET ORAL
Qty: 30 TAB | Refills: 0 | Status: SHIPPED | OUTPATIENT
Start: 2019-04-12 | End: 2019-05-14 | Stop reason: SDUPTHER

## 2019-04-12 NOTE — PROGRESS NOTES
1. Have you been to the ER, urgent care clinic since your last visit? Hospitalized since your last visit?no    2. Have you seen or consulted any other health care providers outside of the [address] [place] [address] since your last visit? Include any pap smears or colon screening.  No    3 most recent PHQ Screens 1/18/2019   PHQ Not Done -   Little interest or pleasure in doing things Not at all   Feeling down, depressed, irritable, or hopeless Not at all   Total Score PHQ 2 0   Trouble falling or staying asleep, or sleeping too much -   Feeling tired or having little energy -   Poor appetite, weight loss, or overeating -   Feeling bad about yourself - or that you are a failure or have let yourself or your family down -   Moving or speaking so slowly that other people could have noticed; or the opposite being so fidgety that others notice -   Thoughts of being better off dead, or hurting yourself in some way -   How difficult have these problems made it for you to do your work, take care of your home and get along with others -     Chief Complaint   Patient presents with    Depression    Anxiety    Allergies

## 2019-04-12 NOTE — PATIENT INSTRUCTIONS
Dianxin Activation    Thank you for requesting access to Dianxin. Please follow the instructions below to securely access and download your online medical record. Dianxin allows you to send messages to your doctor, view your test results, renew your prescriptions, schedule appointments, and more. How Do I Sign Up? 1. In your internet browser, go to [email or web address]  2. Click on the First Time User? Click Here link in the Sign In box. You will be redirect to the New Member Sign Up page. 3. Enter your Dianxin Access Code exactly as it appears below. You will not need to use this code after youve completed the sign-up process. If you do not sign up before the expiration date, you must request a new code. Dianxin Access Code: [email or web address]  Expires: 2019  3:11 PM (This is the date your Dianxin access code will )    4. Enter the last four digits of your Social Security Number (xxxx) and Date of Birth (mm/dd/yyyy) as indicated and click Submit. You will be taken to the next sign-up page. 5. Create a Dianxin ID. This will be your Dianxin login ID and cannot be changed, so think of one that is secure and easy to remember. 6. Create a Dianxin password. You can change your password at any time. 7. Enter your Password Reset Question and Answer. This can be used at a later time if you forget your password. 8. Enter your e-mail address. You will receive e-mail notification when new information is available in [address] 9. Click Sign Up. You can now view and download portions of your medical record. 10. Click the Download Summary menu link to download a portable copy of your medical information. Additional Information    If you have questions, please visit the Frequently Asked Questions section of the Dianxin website at [email or web address] Remember, Dianxin is NOT to be used for urgent needs. For medical emergencies, dial 911.

## 2019-04-12 NOTE — PROGRESS NOTES
Moira Jaeger is a 54 y.o. female and presents with Depression; Anxiety; and Allergies  . Subjective:    Upper respiratory infection Review:  Moira Jaeger is a 54 y.o. female who complains of nasal congestion,sore throat, productive cough, myalgias  for the past few days, gradually worsening since that time. She denies a history of shortness of breath. Evaluation to date: none. Treatment to date: OTC products. Relevant PMH: No pertinent additional PMH. Anxiety review:  Patient complaints of palpitations, chest pain, paresthesias, insomnia, racing thoughts, feelings of losing control, difficulty concentrating has improved,she has a history of panic attacks Treatment includes Xanax and no other therapies. She denies recurrent thoughts of death and suicidal thoughts without plan. She experiences the following side effects from the treatment: none. She has been having increasing palpitations reported  She was treated for a SVT while in the hospital.  She has seen a therapist and states she has a psy referral soon. She was discharged from therapy    Hypertension Review:  The patient has essential hypertension  Diet and Lifestyle: generally follows a  low sodium diet, exercises sporadically  Home BP Monitoring: is not measured at home. Pertinent ROS:not  taking medications as instructed, no medication side effects noted, no TIA's, no chest pain on exertion, no dyspnea on exertion, no swelling of ankles. COPD REVIEW:  The patient is being seen for follow up of COPD,the patient has been stable  Oxygen: She currently is not on home oxygen therapy. Symptoms: chronic dyspnea: severity = not present: course of sx: stable. Patient uses 2 pillows at night. Patient does continue to smoke cigarettes.       Review of Systems  Constitutional: negative for fevers, chills, anorexia and weight loss  Eyes:   negative for visual disturbance and irritation  ENT:   negative for tinnitus,sore throat,nasal congestion,ear pains. hoarseness  Respiratory:  negative for cough, hemoptysis, dyspnea,wheezing  CV:   negative for chest pain, palpitations, lower extremity edema  GI:   negative for nausea, vomiting, diarrhea, abdominal pain,melena  Endo:               negative for polyuria,polydipsia,polyphagia,heat intolerance  Genitourinary: negative for frequency, dysuria and hematuria  Integument:  negative for rash and pruritus  Hematologic:  negative for easy bruising and gum/nose bleeding  Musculoskel: negative for myalgias, arthralgias, back pain, muscle weakness, joint pain  Neurological:  negative for headaches, dizziness, vertigo, memory problems and gait   Behavl/Psych: feelings of anxiety, depression, mood changes reported. Past Medical History:   Diagnosis Date    Anemia 2012    Essential hypertension     HX OTHER MEDICAL     2009 baby put up for adoption, tested + for cocaine with that pregnancy    Hypertension     SVT (supraventricular tachycardia) ([facility] [address] 2018     Past Surgical History:   Procedure Laterality Date    HX CHOLECYSTECTOMY      HX GYN           Social History     Socioeconomic History    Marital status: LEGALLY      Spouse name: Not on file    Number of children: Not on file    Years of education: Not on file    Highest education level: Not on file   Tobacco Use    Smoking status: Current Every Day Smoker     Packs/day: 0.50    Smokeless tobacco: Never Used   Substance and Sexual Activity    Alcohol use: Yes    Drug use: No    Sexual activity: Yes     Birth control/protection: None     Family History   Problem Relation Age of Onset    Hypertension Father      Current Outpatient Medications   Medication Sig Dispense Refill    candesartan (ATACAND) 32 mg tablet TAKE ONE TABLET BY MOUTH EVERY DAY 30 Tab 0    ALPRAZolam (XANAX) 0.5 mg tablet Take 1 Tab by mouth two (2) times a day.  Max Daily Amount: 1 mg. 60 Tab 0    dilTIAZem CD (CARDIZEM CD) 240 mg ER capsule TAKE ONE CAPSULE BY MOUTH EVERY DAY 30 Cap 0    candesartan (ATACAND) 32 mg tablet Take 1 Tab by mouth daily. 30 Tab 0    hydroCHLOROthiazide (HYDRODIURIL) 25 mg tablet Take 1 Tab by mouth daily. 30 Tab 12    budesonide-formoterol (SYMBICORT) 160-4.5 mcg/actuation HFAA Take 2 Puffs by inhalation two (2) times a day. 1 Inhaler 4    carvedilol (COREG) 3.125 mg tablet Take 1 Tab by mouth two (2) times daily (with meals). (Patient not taking: Reported on 4/12/2019) 60 Tab 6    potassium chloride (KLOR-CON) 10 mEq tablet Take 2 Tabs by mouth daily. (Patient not taking: Reported on 4/12/2019) 39 Tab 12     Allergies   Allergen Reactions    Hydralazine Unknown (comments)     jitteriness    Hydromorphone Itching     morphine    Morphine Itching    Sulfa (Sulfonamide Antibiotics) Nausea and Vomiting       Objective:  Visit Vitals  /80   Pulse 80   Temp 98.1 °F (36.7 °C) (Oral)   Resp 16   Ht 5' 7\" (1.702 m)   Wt 187 lb (84.8 kg)   SpO2 95%   BMI 29.29 kg/m²     Physical Exam:   General appearance - alert, well appearing, and in no distress  Mental status - alert, oriented to person, place, and time  EYE-ROCKY, EOMI, corneas normal, no foreign bodies  ENT-ENT exam normal, no neck nodes or sinus tenderness  Nose - normal and patent, no erythema, discharge or polyps  Mouth - mucous membranes moist, pharynx normal without lesions  Neck - supple, no significant adenopathy   Chest - clear to auscultation, no wheezes, rales or rhonchi, symmetric air entry   Heart - normal rate, regular rhythm, normal S1, S2, no murmurs, rubs, clicks or gallops   Abdomen - soft, nontender, nondistended, no masses or organomegaly  Lymph- no adenopathy palpable  Ext-peripheral pulses normal, no pedal edema, no clubbing or cyanosis  Skin-Warm and dry.  no hyperpigmentation, vitiligo, or suspicious lesions  Neuro -alert, oriented, normal speech, no focal findings or movement disorder noted  Neck-normal C-spine, no tenderness, full ROM without pain  Feet-no nail deformities or callus formation with good pulses noted      Results for orders placed or performed during the hospital encounter of 11/16/18   CULTURE, URINE   Result Value Ref Range    Special Requests: NO SPECIAL REQUESTS      [place] Count <10,000  COLONIES/mL        Culture result: NO SIGNIFICANT GROWTH     CULTURE, BLOOD, PAIRED   Result Value Ref Range    Special Requests: NO SPECIAL REQUESTS      Culture result: NO GROWTH 5 DAYS     CBC WITH AUTOMATED DIFF   Result Value Ref Range    WBC 23.6 (H) 3.6 - 11.0 K/uL    RBC 4.50 3.80 - 5.20 M/uL    HGB 14.1 11.5 - 16.0 g/dL    HCT 41.1 35.0 - 47.0 %    MCV 91.3 80.0 - 99.0 FL    MCH 31.3 26.0 - 34.0 PG    MCHC 34.3 30.0 - 36.5 g/dL    RDW 13.6 11.5 - 14.5 %    PLATELET 578 308 - 996 K/uL    MPV 11.4 8.9 - 12.9 FL    NRBC 0.0 0  WBC    ABSOLUTE NRBC 0.00 0.00 - 0.01 K/uL    NEUTROPHILS 83 (H) 32 - 75 %    LYMPHOCYTES 10 (L) 12 - 49 %    MONOCYTES 6 5 - 13 %    EOSINOPHILS 0 0 - 7 %    BASOPHILS 0 0 - 1 %    IMMATURE GRANULOCYTES 1 (H) 0.0 - 0.5 %    ABS. NEUTROPHILS 19.6 (H) 1.8 - 8.0 K/UL    ABS. LYMPHOCYTES 2.4 0.8 - 3.5 K/UL    ABS. MONOCYTES 1.3 (H) 0.0 - 1.0 K/UL    ABS. EOSINOPHILS 0.1 0.0 - 0.4 K/UL    ABS. BASOPHILS 0.1 0.0 - 0.1 K/UL    ABS. IMM. GRANS. 0.2 (H) 0.00 - 0.04 K/UL    DF AUTOMATED     METABOLIC PANEL, COMPREHENSIVE   Result Value Ref Range    Sodium 134 (L) 136 - 145 mmol/L    Potassium 2.8 (L) 3.5 - 5.1 mmol/L    Chloride 99 97 - 108 mmol/L    CO2 26 21 - 32 mmol/L    Anion gap 9 5 - 15 mmol/L    Glucose 142 (H) 65 - 100 mg/dL    BUN 11 6 - 20 MG/DL    Creatinine 1.15 (H) 0.55 - 1.02 MG/DL    BUN/Creatinine ratio 10 (L) 12 - 20      GFR est AA >60 >60 ml/min/1.73m2    GFR est non-AA 51 (L) >60 ml/min/1.73m2    Calcium 8.6 8.5 - 10.1 MG/DL    Bilirubin, total 0.6 0.2 - 1.0 MG/DL    ALT (SGPT) 35 12 - 78 U/L    AST (SGOT) 21 15 - 37 U/L    Alk.  phosphatase 80 45 - 117 U/L    Protein, total 8.3 (H) 6.4 - 8.2 g/dL    Albumin 4.0 3.5 - 5.0 g/dL    Globulin 4.3 (H) 2.0 - 4.0 g/dL    A-G Ratio 0.9 (L) 1.1 - 2.2     NT-PRO BNP   Result Value Ref Range    NT pro-BNP 3,353 (H) 0 - 125 PG/ML   TROPONIN I   Result Value Ref Range    Troponin-I, Qt. 0.05 (H) <0.05 ng/mL   URINALYSIS W/ RFLX MICROSCOPIC   Result Value Ref Range    Color YELLOW/STRAW      Appearance CLOUDY (A) CLEAR      Specific gravity 1.006 1.003 - 1.030      pH (UA) 5.5 5.0 - 8.0      Protein 30 (A) NEG mg/dL    Glucose NEGATIVE  NEG mg/dL    Ketone NEGATIVE  NEG mg/dL    Bilirubin NEGATIVE  NEG      Blood SMALL (A) NEG      Urobilinogen 0.2 0.2 - 1.0 EU/dL    Nitrites NEGATIVE  NEG      Leukocyte Esterase LARGE (A) NEG      WBC  0 - 4 /hpf    RBC 5-10 0 - 5 /hpf    Epithelial cells MANY (A) FEW /lpf    Bacteria NEGATIVE  NEG /hpf    Hyaline cast 0-2 0 - 5 /lpf   MAGNESIUM   Result Value Ref Range    Magnesium 1.8 1.6 - 2.4 mg/dL   TROPONIN I   Result Value Ref Range    Troponin-I, Qt. <0.05 <0.05 ng/mL   TROPONIN I   Result Value Ref Range    Troponin-I, Qt. <0.05 <0.03 ng/mL   METABOLIC PANEL, BASIC   Result Value Ref Range    Sodium 135 (L) 136 - 145 mmol/L    Potassium 3.1 (L) 3.5 - 5.1 mmol/L    Chloride 102 97 - 108 mmol/L    CO2 23 21 - 32 mmol/L    Anion gap 10 5 - 15 mmol/L    Glucose 234 (H) 65 - 100 mg/dL    BUN 20 6 - 20 MG/DL    Creatinine 1.42 (H) 0.55 - 1.02 MG/DL    BUN/Creatinine ratio 14 12 - 20      GFR est AA 48 (L) >60 ml/min/1.73m2    GFR est non-AA 40 (L) >60 ml/min/1.73m2    Calcium 9.4 8.5 - 10.1 MG/DL   CBC WITH AUTOMATED DIFF   Result Value Ref Range    WBC 20.6 (H) 3.6 - 11.0 K/uL    RBC 4.62 3.80 - 5.20 M/uL    HGB 14.3 11.5 - 16.0 g/dL    HCT 42.2 35.0 - 47.0 %    MCV 91.3 80.0 - 99.0 FL    MCH 31.0 26.0 - 34.0 PG    MCHC 33.9 30.0 - 36.5 g/dL    RDW 13.3 11.5 - 14.5 %    PLATELET 919 470 - 484 K/uL    MPV 11.4 8.9 - 12.9 FL    NRBC 0.0 0  WBC    ABSOLUTE NRBC 0.00 0.00 - 0.01 K/uL    NEUTROPHILS 92 (H) 32 - 75 % LYMPHOCYTES 5 (L) 12 - 49 %    MONOCYTES 2 (L) 5 - 13 %    EOSINOPHILS 0 0 - 7 %    BASOPHILS 0 0 - 1 %    IMMATURE GRANULOCYTES 1 (H) 0.0 - 0.5 %    ABS. NEUTROPHILS 19.0 (H) 1.8 - 8.0 K/UL    ABS. LYMPHOCYTES 1.0 0.8 - 3.5 K/UL    ABS. MONOCYTES 0.4 0.0 - 1.0 K/UL    ABS. EOSINOPHILS 0.0 0.0 - 0.4 K/UL    ABS. BASOPHILS 0.0 0.0 - 0.1 K/UL    ABS. IMM. GRANS. 0.2 (H) 0.00 - 0.04 K/UL    DF AUTOMATED      RBC COMMENTS NORMOCYTIC, NORMOCHROMIC     TSH 3RD GENERATION   Result Value Ref Range    TSH 0.35 (L) 0.36 - 3.74 uIU/mL   CBC WITH AUTOMATED DIFF   Result Value Ref Range    WBC 25.0 (H) 3.6 - 11.0 K/uL    RBC 4.28 3.80 - 5.20 M/uL    HGB 13.2 11.5 - 16.0 g/dL    HCT 40.2 35.0 - 47.0 %    MCV 93.9 80.0 - 99.0 FL    MCH 30.8 26.0 - 34.0 PG    MCHC 32.8 30.0 - 36.5 g/dL    RDW 13.7 11.5 - 14.5 %    PLATELET 014 149 - 363 K/uL    MPV 11.5 8.9 - 12.9 FL    NRBC 0.0 0  WBC    ABSOLUTE NRBC 0.00 0.00 - 0.01 K/uL    NEUTROPHILS 90 (H) 32 - 75 %    LYMPHOCYTES 5 (L) 12 - 49 %    MONOCYTES 3 (L) 5 - 13 %    EOSINOPHILS 0 0 - 7 %    BASOPHILS 0 0 - 1 %    IMMATURE GRANULOCYTES 1 (H) 0.0 - 0.5 %    ABS. NEUTROPHILS 22.6 (H) 1.8 - 8.0 K/UL    ABS. LYMPHOCYTES 1.3 0.8 - 3.5 K/UL    ABS. MONOCYTES 0.8 0.0 - 1.0 K/UL    ABS. EOSINOPHILS 0.0 0.0 - 0.4 K/UL    ABS. BASOPHILS 0.0 0.0 - 0.1 K/UL    ABS. IMM.  GRANS. 0.2 (H) 0.00 - 0.04 K/UL    DF AUTOMATED     METABOLIC PANEL, BASIC   Result Value Ref Range    Sodium 139 136 - 145 mmol/L    Potassium 3.8 3.5 - 5.1 mmol/L    Chloride 103 97 - 108 mmol/L    CO2 29 21 - 32 mmol/L    Anion gap 7 5 - 15 mmol/L    Glucose 162 (H) 65 - 100 mg/dL    BUN 28 (H) 6 - 20 MG/DL    Creatinine 1.43 (H) 0.55 - 1.02 MG/DL    BUN/Creatinine ratio 20 12 - 20      GFR est AA 48 (L) >60 ml/min/1.73m2    GFR est non-AA 40 (L) >60 ml/min/1.73m2    Calcium 9.6 8.5 - 10.1 MG/DL   MAGNESIUM   Result Value Ref Range    Magnesium 2.1 1.6 - 2.4 mg/dL   PHOSPHORUS   Result Value Ref Range    Phosphorus 3.1 2.6 - 4.7 MG/DL   CBC WITH AUTOMATED DIFF   Result Value Ref Range    WBC 18.6 (H) 3.6 - 11.0 K/uL    RBC 4.35 3.80 - 5.20 M/uL    HGB 13.5 11.5 - 16.0 g/dL    HCT 40.5 35.0 - 47.0 %    MCV 93.1 80.0 - 99.0 FL    MCH 31.0 26.0 - 34.0 PG    MCHC 33.3 30.0 - 36.5 g/dL    RDW 13.4 11.5 - 14.5 %    PLATELET 600 976 - 953 K/uL    MPV 11.1 8.9 - 12.9 FL    NRBC 0.0 0  WBC    ABSOLUTE NRBC 0.00 0.00 - 0.01 K/uL    NEUTROPHILS 81 (H) 32 - 75 %    LYMPHOCYTES 13 12 - 49 %    MONOCYTES 6 5 - 13 %    EOSINOPHILS 0 0 - 7 %    BASOPHILS 0 0 - 1 %    IMMATURE GRANULOCYTES 1 (H) 0.0 - 0.5 %    ABS. NEUTROPHILS 15.0 (H) 1.8 - 8.0 K/UL    ABS. LYMPHOCYTES 2.4 0.8 - 3.5 K/UL    ABS. MONOCYTES 1.0 0.0 - 1.0 K/UL    ABS. EOSINOPHILS 0.0 0.0 - 0.4 K/UL    ABS. BASOPHILS 0.0 0.0 - 0.1 K/UL    ABS. IMM.  GRANS. 0.1 (H) 0.00 - 0.04 K/UL    DF AUTOMATED     METABOLIC PANEL, BASIC   Result Value Ref Range    Sodium 139 136 - 145 mmol/L    Potassium 3.8 3.5 - 5.1 mmol/L    Chloride 104 97 - 108 mmol/L    CO2 27 21 - 32 mmol/L    Anion gap 8 5 - 15 mmol/L    Glucose 229 (H) 65 - 100 mg/dL    BUN 30 (H) 6 - 20 MG/DL    Creatinine 1.35 (H) 0.55 - 1.02 MG/DL    BUN/Creatinine ratio 22 (H) 12 - 20      GFR est AA 51 (L) >60 ml/min/1.73m2    GFR est non-AA 42 (L) >60 ml/min/1.73m2    Calcium 9.0 8.5 - 10.1 MG/DL   MAGNESIUM   Result Value Ref Range    Magnesium 2.1 1.6 - 2.4 mg/dL   PHOSPHORUS   Result Value Ref Range    Phosphorus 3.5 2.6 - 4.7 MG/DL   HCG URINE, QL. - POC   Result Value Ref Range    Pregnancy test,urine (POC) NEGATIVE  NEG     GLUCOSE, POC   Result Value Ref Range    Glucose (POC) 182 (H) 65 - 100 mg/dL    Performed by [facility] (PCT)    GLUCOSE, POC   Result Value Ref Range    Glucose (POC) 212 (H) 65 - 100 mg/dL    [facility]    EKG, 12 LEAD, INITIAL   Result Value Ref Range    Ventricular Rate 99 BPM    Atrial Rate 99 BPM    P-R Interval 134 ms    QRS Duration 92 ms    Q-T Interval 372 ms QTC Calculation (Bezet) 477 ms    Calculated P Axis 51 degrees    Calculated R Axis 4 degrees    Calculated T Axis -178 degrees    Diagnosis       Sinus rhythm with occasional premature ventricular complexes  Biatrial enlargement  ST & T wave abnormality, consider inferolateral ischemia  Prolonged QT    Confirmed by MANDIE Wheeler ([id number]) on 11/16/2018 9:12:29 AM     EKG, 12 LEAD, INITIAL   Result Value Ref Range    Ventricular Rate 159 BPM    Atrial Rate 159 BPM    P-R Interval 170 ms    QRS Duration 90 ms    Q-T Interval 308 ms    QTC Calculation (Bezet) 501 ms    Calculated P Axis 7 degrees    Calculated R Axis 8 degrees    Calculated T Axis -154 degrees    Diagnosis       Probable Supraventricular tachycardia  Cannot rule out Sinus tachycardia  Non-specific ST & T wave changes  Confirmed by Caleb Alvarez ([id number]) on 11/17/2018 10:32:13 PM         Assessment/Plan:    ICD-10-CM ICD-9-CM    1. Anxiety and depression F41.9 300.00 ALPRAZolam (XANAX) 0.5 mg tablet    F32.9 311      No orders of the defined types were placed in this encounter. lose weight, increase physical activity, follow low fat diet, follow low salt diet, continue present plan,Take 81mg aspirin daily  Patient Instructions   [employer] Activation    Thank you for requesting access to [employer]. Please follow the instructions below to securely access and download your online medical record. [employer] allows you to send messages to your doctor, view your test results, renew your prescriptions, schedule appointments, and more. How Do I Sign Up? 1. In your internet browser, go to [email or web address]  2. Click on the First Time User? Click Here link in the Sign In box. You will be redirect to the New Member Sign Up page. 3. Enter your [employer] Access Code exactly as it appears below. You will not need to use this code after youve completed the sign-up process.  If you do not sign up before the expiration date, you must request a new code.    [employer] Access Code: [email or web address]  Expires: 2019  3:11 PM (This is the date your [employer] access code will )    4. Enter the last four digits of your Social Security Number (xxxx) and Date of Birth (mm/dd/yyyy) as indicated and click Submit. You will be taken to the next sign-up page. 5. Create a [employer] ID. This will be your [employer] login ID and cannot be changed, so think of one that is secure and easy to remember. 6. Create a [employer] password. You can change your password at any time. 7. Enter your Password Reset Question and Answer. This can be used at a later time if you forget your password. 8. Enter your e-mail address. You will receive e-mail notification when new information is available in [address] 9. Click Sign Up. You can now view and download portions of your medical record. 10. Click the Download Summary menu link to download a portable copy of your medical information. Additional Information    If you have questions, please visit the Frequently Asked Questions section of the [employer] website at [email or web address] Remember, [employer] is NOT to be used for urgent needs. For medical emergencies, dial 911. I have reviewed with the patient details of the assessment and plan and all questions were answered. Relevent patient education was performed. The most recent lab findings were reviewed with the patient. An After Visit Summary was printed and given to the patient.

## 2019-04-15 DIAGNOSIS — I10 BENIGN ESSENTIAL HTN: ICD-10-CM

## 2019-04-15 RX ORDER — DILTIAZEM HYDROCHLORIDE 240 MG/1
CAPSULE, COATED, EXTENDED RELEASE ORAL
Qty: 30 CAP | Refills: 0 | Status: SHIPPED | OUTPATIENT
Start: 2019-04-15 | End: 2019-05-14 | Stop reason: ALTCHOICE

## 2019-04-15 RX ORDER — CANDESARTAN 32 MG/1
TABLET ORAL
Qty: 30 TAB | Refills: 3 | Status: SHIPPED | OUTPATIENT
Start: 2019-04-15 | End: 2019-06-21

## 2019-04-16 DIAGNOSIS — I10 BENIGN ESSENTIAL HTN: ICD-10-CM

## 2019-04-16 RX ORDER — BUDESONIDE AND FORMOTEROL FUMARATE DIHYDRATE 160; 4.5 UG/1; UG/1
2 AEROSOL RESPIRATORY (INHALATION) 2 TIMES DAILY
Qty: 1 INHALER | Refills: 4 | Status: SHIPPED | OUTPATIENT
Start: 2019-04-16 | End: 2019-04-30 | Stop reason: SDUPTHER

## 2019-04-30 DIAGNOSIS — I10 BENIGN ESSENTIAL HTN: ICD-10-CM

## 2019-05-01 RX ORDER — BUDESONIDE AND FORMOTEROL FUMARATE DIHYDRATE 160; 4.5 UG/1; UG/1
2 AEROSOL RESPIRATORY (INHALATION) 2 TIMES DAILY
Qty: 1 INHALER | Refills: 4 | Status: SHIPPED | OUTPATIENT
Start: 2019-05-01 | End: 2019-06-21 | Stop reason: SDUPTHER

## 2019-05-02 ENCOUNTER — HOSPITAL ENCOUNTER (EMERGENCY)
Age: 49
Discharge: HOME | End: 2019-05-02
Attending: EMERGENCY MEDICINE
Payer: MEDICAID

## 2019-05-02 ENCOUNTER — APPOINTMENT (OUTPATIENT)
Dept: CT IMAGING | Age: 49
End: 2019-05-02
Payer: MEDICAID

## 2019-05-02 VITALS
DIASTOLIC BLOOD PRESSURE: 88 MMHG | HEART RATE: 95 BPM | RESPIRATION RATE: 16 BRPM | WEIGHT: 188.27 LBS | BODY MASS INDEX: 29.55 KG/M2 | HEIGHT: 67 IN | SYSTOLIC BLOOD PRESSURE: 122 MMHG | OXYGEN SATURATION: 91 % | TEMPERATURE: 97.9 F

## 2019-05-02 DIAGNOSIS — F17.200 TOBACCO DEPENDENCE: ICD-10-CM

## 2019-05-02 DIAGNOSIS — G50.1 ATYPICAL FACE PAIN: ICD-10-CM

## 2019-05-02 DIAGNOSIS — R22.0 RIGHT FACIAL SWELLING: Primary | ICD-10-CM

## 2019-05-02 DIAGNOSIS — K08.89 DENTALGIA: ICD-10-CM

## 2019-05-02 LAB
ANION GAP SERPL CALC-SCNC: 6 MMOL/L (ref 5–15)
BASOPHILS # BLD: 0.1 K/UL (ref 0–0.1)
BASOPHILS NFR BLD: 0 % (ref 0–1)
BUN SERPL-MCNC: 22 MG/DL (ref 6–20)
BUN/CREAT SERPL: 21 (ref 12–20)
CALCIUM SERPL-MCNC: 9 MG/DL (ref 8.5–10.1)
CHLORIDE SERPL-SCNC: 105 MMOL/L (ref 97–108)
CO2 SERPL-SCNC: 27 MMOL/L (ref 21–32)
CREAT SERPL-MCNC: 1.05 MG/DL (ref 0.55–1.02)
DIFFERENTIAL METHOD BLD: ABNORMAL
EOSINOPHIL # BLD: 0.1 K/UL (ref 0–0.4)
EOSINOPHIL NFR BLD: 1 % (ref 0–7)
ERYTHROCYTE [DISTWIDTH] IN BLOOD BY AUTOMATED COUNT: 12.7 % (ref 11.5–14.5)
GLUCOSE SERPL-MCNC: 100 MG/DL (ref 65–100)
HCT VFR BLD AUTO: 41.7 % (ref 35–47)
HGB BLD-MCNC: 14.2 G/DL (ref 11.5–16)
IMM GRANULOCYTES # BLD AUTO: 0.1 K/UL (ref 0–0.04)
IMM GRANULOCYTES NFR BLD AUTO: 0 % (ref 0–0.5)
LYMPHOCYTES # BLD: 2.3 K/UL (ref 0.8–3.5)
LYMPHOCYTES NFR BLD: 20 % (ref 12–49)
MCH RBC QN AUTO: 31.8 PG (ref 26–34)
MCHC RBC AUTO-ENTMCNC: 34.1 G/DL (ref 30–36.5)
MCV RBC AUTO: 93.5 FL (ref 80–99)
MONOCYTES # BLD: 0.8 K/UL (ref 0–1)
MONOCYTES NFR BLD: 7 % (ref 5–13)
NEUTS SEG # BLD: 8.2 K/UL (ref 1.8–8)
NEUTS SEG NFR BLD: 72 % (ref 32–75)
NRBC # BLD: 0 K/UL (ref 0–0.01)
NRBC BLD-RTO: 0 PER 100 WBC
PLATELET # BLD AUTO: 286 K/UL (ref 150–400)
PMV BLD AUTO: 10.8 FL (ref 8.9–12.9)
POTASSIUM SERPL-SCNC: 3.5 MMOL/L (ref 3.5–5.1)
RBC # BLD AUTO: 4.46 M/UL (ref 3.8–5.2)
SODIUM SERPL-SCNC: 138 MMOL/L (ref 136–145)
WBC # BLD AUTO: 11.5 K/UL (ref 3.6–11)

## 2019-05-02 PROCEDURE — 96365 THER/PROPH/DIAG IV INF INIT: CPT

## 2019-05-02 PROCEDURE — 74011250637 HC RX REV CODE- 250/637: Performed by: PHYSICIAN ASSISTANT

## 2019-05-02 PROCEDURE — 70487 CT MAXILLOFACIAL W/DYE: CPT

## 2019-05-02 PROCEDURE — 85025 COMPLETE CBC W/AUTO DIFF WBC: CPT

## 2019-05-02 PROCEDURE — 74011250636 HC RX REV CODE- 250/636: Performed by: PHYSICIAN ASSISTANT

## 2019-05-02 PROCEDURE — 36415 COLL VENOUS BLD VENIPUNCTURE: CPT

## 2019-05-02 PROCEDURE — 74011636320 HC RX REV CODE- 636/320: Performed by: EMERGENCY MEDICINE

## 2019-05-02 PROCEDURE — 80048 BASIC METABOLIC PNL TOTAL CA: CPT

## 2019-05-02 PROCEDURE — 96375 TX/PRO/DX INJ NEW DRUG ADDON: CPT

## 2019-05-02 PROCEDURE — 99283 EMERGENCY DEPT VISIT LOW MDM: CPT

## 2019-05-02 RX ORDER — PREDNISONE 20 MG/1
60 TABLET ORAL DAILY
Qty: 12 TAB | Refills: 0 | Status: SHIPPED | OUTPATIENT
Start: 2019-05-02 | End: 2019-05-06

## 2019-05-02 RX ORDER — CLINDAMYCIN HYDROCHLORIDE 300 MG/1
300 CAPSULE ORAL 3 TIMES DAILY
Qty: 30 CAP | Refills: 0 | Status: SHIPPED | OUTPATIENT
Start: 2019-05-02 | End: 2019-05-02 | Stop reason: ALTCHOICE

## 2019-05-02 RX ORDER — CEPHALEXIN 500 MG/1
500 CAPSULE ORAL 3 TIMES DAILY
Qty: 30 CAP | Refills: 0 | Status: SHIPPED | OUTPATIENT
Start: 2019-05-02 | End: 2019-05-12

## 2019-05-02 RX ORDER — SODIUM CHLORIDE 0.9 % (FLUSH) 0.9 %
5-40 SYRINGE (ML) INJECTION AS NEEDED
Status: DISCONTINUED | OUTPATIENT
Start: 2019-05-02 | End: 2019-05-02 | Stop reason: HOSPADM

## 2019-05-02 RX ORDER — SODIUM CHLORIDE 0.9 % (FLUSH) 0.9 %
5-40 SYRINGE (ML) INJECTION EVERY 8 HOURS
Status: DISCONTINUED | OUTPATIENT
Start: 2019-05-02 | End: 2019-05-02 | Stop reason: HOSPADM

## 2019-05-02 RX ORDER — OXYCODONE AND ACETAMINOPHEN 5; 325 MG/1; MG/1
1 TABLET ORAL
Qty: 12 TAB | Refills: 0 | Status: SHIPPED | OUTPATIENT
Start: 2019-05-02 | End: 2019-05-07

## 2019-05-02 RX ORDER — OXYCODONE AND ACETAMINOPHEN 5; 325 MG/1; MG/1
1 TABLET ORAL
Status: COMPLETED | OUTPATIENT
Start: 2019-05-02 | End: 2019-05-02

## 2019-05-02 RX ORDER — ONDANSETRON 2 MG/ML
4 INJECTION INTRAMUSCULAR; INTRAVENOUS
Status: COMPLETED | OUTPATIENT
Start: 2019-05-02 | End: 2019-05-02

## 2019-05-02 RX ORDER — CLINDAMYCIN PHOSPHATE 900 MG/50ML
900 INJECTION INTRAVENOUS
Status: COMPLETED | OUTPATIENT
Start: 2019-05-02 | End: 2019-05-02

## 2019-05-02 RX ORDER — DIPHENHYDRAMINE HYDROCHLORIDE 50 MG/ML
25 INJECTION, SOLUTION INTRAMUSCULAR; INTRAVENOUS
Status: DISCONTINUED | OUTPATIENT
Start: 2019-05-02 | End: 2019-05-02 | Stop reason: HOSPADM

## 2019-05-02 RX ORDER — SODIUM CHLORIDE 0.9 % (FLUSH) 0.9 %
10 SYRINGE (ML) INJECTION
Status: COMPLETED | OUTPATIENT
Start: 2019-05-02 | End: 2019-05-02

## 2019-05-02 RX ORDER — DEXAMETHASONE SODIUM PHOSPHATE 4 MG/ML
10 INJECTION, SOLUTION INTRA-ARTICULAR; INTRALESIONAL; INTRAMUSCULAR; INTRAVENOUS; SOFT TISSUE
Status: COMPLETED | OUTPATIENT
Start: 2019-05-02 | End: 2019-05-02

## 2019-05-02 RX ADMIN — OXYCODONE AND ACETAMINOPHEN 1 TABLET: 5; 325 TABLET ORAL at 13:49

## 2019-05-02 RX ADMIN — DEXAMETHASONE SODIUM PHOSPHATE 10 MG: 4 INJECTION, SOLUTION INTRAMUSCULAR; INTRAVENOUS at 13:49

## 2019-05-02 RX ADMIN — IOPAMIDOL 100 ML: 755 INJECTION, SOLUTION INTRAVENOUS at 15:52

## 2019-05-02 RX ADMIN — Medication 10 ML: at 15:52

## 2019-05-02 RX ADMIN — ONDANSETRON 4 MG: 2 INJECTION INTRAMUSCULAR; INTRAVENOUS at 13:50

## 2019-05-02 RX ADMIN — SODIUM CHLORIDE 1000 ML: 900 INJECTION, SOLUTION INTRAVENOUS at 13:49

## 2019-05-02 RX ADMIN — CLINDAMYCIN PHOSPHATE 900 MG: 900 INJECTION, SOLUTION INTRAVENOUS at 13:50

## 2019-05-02 NOTE — LETTER
[facility] EMERGENCY DEPT 
[address] [id number] 
[phone] Work/School Note Date: 5/2/2019 To Whom It May concern: 
 
Rabia Ahmadi was seen and treated today in the emergency room. She may return to work in 2 to 3 days, as symptoms improve. Sincerely, Jozef Hayward

## 2019-05-02 NOTE — ED PROVIDER NOTES
EMERGENCY DEPARTMENT HISTORY AND PHYSICAL EXAM      Date: 5/2/2019  Patient Name: Frantz Guerrero    History of Presenting Illness     Chief Complaint   Patient presents with    Facial Swelling     pt presents with c/o right face swelling x2-3 days. pt stated that she had a tooth break off and that she has been having the swelling and difficulty swallowing       History Provided By: Patient    HPI: Frantz Guerrero, 54 y.o. female presents ambulatory to the ED with c/o facial swelling x 2-3 days. Pt states her symptoms began after her R upper molar fractured to the base (it was severely decayed prior to breaking). She reports she has a dental appt scheduled for next week. She had planned to await her dental appt; however, once the facial swelling/pain worsened she could not wait any longer. She denied drainage or bleeding from the tooth. She denied difficulty breathing or chest pain but reported some difficulty swallowing. No fever/chills. +tobacco use   She denied N/V/D      Chief Complaint: facial swelling, dental fracture  Duration: 3 Days  Timing:  Acute  Location: R sided facial swelling  Quality: Aching and Pressure  Severity: Severe  Modifying Factors: poor dentition, tobacco use, unable to see dentist until next week  Associated Symptoms: difficulty swallowing        There are no other complaints, changes, or physical findings at this time.     PCP: Elkin Cast MD    Current Facility-Administered Medications   Medication Dose Route Frequency Provider Last Rate Last Dose    sodium chloride (NS) flush 5-40 mL  5-40 mL IntraVENous Q8H Joanne Aguayo [place]        sodium chloride (NS) flush 5-40 mL  5-40 mL IntraVENous PRN Racheal Formanal M, [place]        sodium chloride 0.9 % bolus infusion 1,000 mL  1,000 mL IntraVENous ONCE Glennie Leventhal [place], [place] 1,000 mL/hr at 05/02/19 1349 1,000 mL at 05/02/19 1349    clindamycin (CLEOCIN) 900mg D5W 50mL IVPB (premix)  900 mg IntraVENous NOW Glennie Leventhal [place], [place] 100 mL/hr at 19 1350 900 mg at 19 1350    sodium chloride (NS) flush 10 mL  10 mL IntraVENous RAD ONCE Albert Deal, DO        iopamidol (ISOVUE-370) 76 % injection 100 mL  100 mL IntraVENous RAD New Phelps, DO         Current Outpatient Medications   Medication Sig Dispense Refill    budesonide-formoterol (SYMBICORT) 160-4.5 mcg/actuation HFAA Take 2 Puffs by inhalation two (2) times a day. 1 Inhaler 4    candesartan (ATACAND) 32 mg tablet TAKE ONE TABLET BY MOUTH EVERY DAY 30 Tab 3    dilTIAZem CD (CARDIZEM CD) 240 mg ER capsule TAKE ONE CAPSULE BY MOUTH EVERY DAY 30 Cap 0    ALPRAZolam (XANAX) 0.5 mg tablet Si tab daily prn 30 Tab 0    azithromycin (ZITHROMAX) 250 mg tablet 2 tabs today and then 1 tab daily for 4 days 6 Tab 0    candesartan (ATACAND) 32 mg tablet TAKE ONE TABLET BY MOUTH EVERY DAY 30 Tab 0    carvedilol (COREG) 3.125 mg tablet Take 1 Tab by mouth two (2) times daily (with meals). (Patient not taking: Reported on 2019) 60 Tab 6    dilTIAZem CD (CARDIZEM CD) 240 mg ER capsule TAKE ONE CAPSULE BY MOUTH EVERY DAY 30 Cap 0    candesartan (ATACAND) 32 mg tablet Take 1 Tab by mouth daily. 30 Tab 0    hydroCHLOROthiazide (HYDRODIURIL) 25 mg tablet Take 1 Tab by mouth daily. 30 Tab 12    potassium chloride (KLOR-CON) 10 mEq tablet Take 2 Tabs by mouth daily.  (Patient not taking: Reported on 2019) 44 Tab 12       Past History     Past Medical History:  Past Medical History:   Diagnosis Date    Anemia 2012    Essential hypertension     HX OTHER MEDICAL     2009 baby put up for adoption, tested + for cocaine with that pregnancy    Hypertension     SVT (supraventricular tachycardia) ([facility] [address] 2018       Past Surgical History:  Past Surgical History:   Procedure Laterality Date    HX CHOLECYSTECTOMY      HX GYN             Family History:  Family History   Problem Relation Age of Onset    Hypertension Father        Social History:  Social History Tobacco Use    Smoking status: Current Every Day Smoker     Packs/day: 0.50    Smokeless tobacco: Never Used   Substance Use Topics    Alcohol use: Yes    Drug use: No       Allergies: Allergies   Allergen Reactions    Hydralazine Unknown (comments)     jitteriness    Hydromorphone Itching     morphine    Morphine Itching    Sulfa (Sulfonamide Antibiotics) Nausea and Vomiting         Review of Systems   Review of Systems   Constitutional: Negative for chills and fever. HENT: Positive for dental problem, facial swelling and trouble swallowing. Negative for congestion, mouth sores, rhinorrhea, sinus pressure, sinus pain and sore throat. Eyes: Negative for pain and discharge. Respiratory: Negative for cough and shortness of breath. Cardiovascular: Negative for chest pain and palpitations. Gastrointestinal: Negative for diarrhea, nausea and vomiting. Endocrine: Negative for polydipsia, polyphagia and polyuria. Genitourinary: Negative for dysuria and hematuria. Musculoskeletal: Negative for neck pain and neck stiffness. Skin: Negative for rash and wound. Allergic/Immunologic: Negative for food allergies and immunocompromised state. Neurological: Negative for dizziness, speech difficulty and headaches. Hematological: Negative for adenopathy. Does not bruise/bleed easily. Psychiatric/Behavioral: Negative for agitation and confusion. The patient is nervous/anxious. Physical Exam   Physical Exam   Constitutional: She is oriented to person, place, and time. She appears well-developed and well-nourished. No distress. WDWN female, alert, in NAD   HENT:   Head: Normocephalic and atraumatic. Nose: Nose normal.   Mouth/Throat: No oropharyngeal exudate. Pt with poor overall dental health, multiple dental caries, decayed/missing teeth. Tender to R upper molar region   which was notably decayed. No gingival erythema, edema, exudate, or bleeding noted.   R sided facial swelling noted.  No heat. Pt speaking in full sentences, maintaining own secretions. Eyes: Conjunctivae and EOM are normal. Right eye exhibits no discharge. Left eye exhibits no discharge. No scleral icterus. Neck: Normal range of motion. Neck supple. No JVD present. No tracheal deviation present. No thyromegaly present. Cardiovascular: Normal rate, regular rhythm and normal heart sounds. Pulmonary/Chest: Effort normal and breath sounds normal. No respiratory distress. She has no wheezes. Musculoskeletal: Normal range of motion. She exhibits no edema. Lymphadenopathy:     She has no cervical adenopathy. Neurological: She is alert and oriented to person, place, and time. She exhibits normal muscle tone. Coordination normal.   Skin: Skin is warm and dry. No rash noted. She is not diaphoretic. No erythema. Psychiatric: She has a normal mood and affect. Her behavior is normal. Judgment normal.   Nursing note and vitals reviewed. Diagnostic Study Results     Labs -   No results found for this or any previous visit (from the past 12 hour(s)). Radiologic Studies -   CT MAXILLOFACIAL W CONT   Final Result   IMPRESSION: Fluid collection likely abscess related to maxilla. tooth As   explained above. Medical Decision Making   I am the first provider for this patient. I reviewed the vital signs, available nursing notes, past medical history, past surgical history, family history and social history. Vital Signs-Reviewed the patient's vital signs. Patient Vitals for the past 12 hrs:   Temp Pulse Resp BP SpO2   05/02/19 1330 97.9 °F (36.6 °C) 95 16 122/88 91 %         Records Reviewed: Nursing Notes, Old Medical Records, Previous Radiology Studies and Previous Laboratory Studies    Provider Notes (Medical Decision Making):   Dental caries, dental abscess, cellulitis      ED Course:   Initial assessment performed.  The patients presenting problems have been discussed, and they are in agreement with the care plan formulated and outlined with them. I have encouraged them to ask questions as they arise throughout their visit. TOBACCO CESSATION COUNSELING  The patient was counseled on the dangers of tobacco use, and was advised to quit. Reviewed strategies to maximize success, including removing cigarettes and smoking materials from environment, stress management, substitution of other forms of reinforcement, support of family/friends and written materials. DISCHARGE NOTE:  The care plan has been outline with the patient and/or family, who verbally conveyed understanding and agreement. Available results have been reviewed. Patient and/or family understand the follow up plan as outlined and discharge instructions. Should their condition deterioration at any time after discharge the patient agrees to return, follow up sooner than outlined or seek medical assistance at the closest Emergency Room as soon as possible. Questions have been answered. Discharge instructions and educational information regarding the patient's diagnosis as well a list of reasons why the patient would want to seek immediate medical attention, should their condition change, were reviewed directly with the patient/family       PLAN:  1. Discharge Medication List as of 5/2/2019  3:08 PM      START taking these medications    Details   oxyCODONE-acetaminophen (PERCOCET) 5-325 mg per tablet Take 1 Tab by mouth every eight (8) hours as needed for Pain for up to 5 days. Max Daily Amount: 3 Tabs. Indications: Pain, Print, Disp-12 Tab, R-0      predniSONE (DELTASONE) 20 mg tablet Take 60 mg by mouth daily for 4 days. , Print, Disp-12 Tab, R-0      clindamycin (CLEOCIN) 300 mg capsule Take 1 Cap by mouth three (3) times daily for 10 days. , Print, Disp-30 Cap, R-0         CONTINUE these medications which have NOT CHANGED    Details   budesonide-formoterol (SYMBICORT) 160-4.5 mcg/actuation HFAA Take 2 Puffs by inhalation two (2) times a day., Normal, Disp-1 Inhaler, R-4      !! candesartan (ATACAND) 32 mg tablet TAKE ONE TABLET BY MOUTH EVERY DAY, Normal, Disp-30 Tab, R-3      !! dilTIAZem CD (CARDIZEM CD) 240 mg ER capsule TAKE ONE CAPSULE BY MOUTH EVERY DAY, Normal, Disp-30 Cap, R-0      ALPRAZolam (XANAX) 0.5 mg tablet Si tab daily prn, Print, Disp-30 Tab, R-0      azithromycin (ZITHROMAX) 250 mg tablet 2 tabs today and then 1 tab daily for 4 days, Print, Disp-6 Tab, R-0      !! candesartan (ATACAND) 32 mg tablet TAKE ONE TABLET BY MOUTH EVERY DAY, Normal, Disp-30 Tab, R-0      carvedilol (COREG) 3.125 mg tablet Take 1 Tab by mouth two (2) times daily (with meals). , Normal, Disp-60 Tab, R-6      !! dilTIAZem CD (CARDIZEM CD) 240 mg ER capsule TAKE ONE CAPSULE BY MOUTH EVERY DAY, Normal, Disp-30 Cap, R-0      !! candesartan (ATACAND) 32 mg tablet Take 1 Tab by mouth daily. , Normal, Disp-30 Tab, R-0      hydroCHLOROthiazide (HYDRODIURIL) 25 mg tablet Take 1 Tab by mouth daily. , Normal, Disp-30 Tab, R-12      potassium chloride (KLOR-CON) 10 mEq tablet Take 2 Tabs by mouth daily. , Print, Disp-39 Tab, R-12       !! - Potential duplicate medications found. Please discuss with provider. 2.   Follow-up Information     Follow up With Specialties Details Why Contact Info    Your dentist        [facility] EMERGENCY DEPT Emergency Medicine  If symptoms worsen [address] [place] [address] [place]  [phone]        Return to ED if worse     Diagnosis     Clinical Impression:   1. Right facial swelling    2. Atypical face pain    3. Dentalgia    4.  Tobacco dependence

## 2019-05-02 NOTE — ED NOTES
Patient is having swelling to the right side of her cheek due to a tooth beuing cracked and infected.

## 2019-05-14 ENCOUNTER — OFFICE VISIT (OUTPATIENT)
Dept: INTERNAL MEDICINE CLINIC | Age: 49
End: 2019-05-14

## 2019-05-14 VITALS
HEART RATE: 82 BPM | SYSTOLIC BLOOD PRESSURE: 106 MMHG | DIASTOLIC BLOOD PRESSURE: 84 MMHG | OXYGEN SATURATION: 96 % | TEMPERATURE: 97.7 F | WEIGHT: 197 LBS | BODY MASS INDEX: 30.92 KG/M2 | HEIGHT: 67 IN | RESPIRATION RATE: 16 BRPM

## 2019-05-14 DIAGNOSIS — I10 BENIGN ESSENTIAL HTN: Primary | ICD-10-CM

## 2019-05-14 DIAGNOSIS — R63.5 WEIGHT GAIN: ICD-10-CM

## 2019-05-14 DIAGNOSIS — F41.9 ANXIETY AND DEPRESSION: ICD-10-CM

## 2019-05-14 DIAGNOSIS — I47.1 SVT (SUPRAVENTRICULAR TACHYCARDIA) (HCC): ICD-10-CM

## 2019-05-14 DIAGNOSIS — F32.A ANXIETY AND DEPRESSION: ICD-10-CM

## 2019-05-14 DIAGNOSIS — J44.9 CHRONIC OBSTRUCTIVE PULMONARY DISEASE, UNSPECIFIED COPD TYPE (HCC): ICD-10-CM

## 2019-05-14 RX ORDER — DILTIAZEM HYDROCHLORIDE 120 MG/1
TABLET, FILM COATED ORAL
Qty: 30 TAB | Refills: 6 | Status: SHIPPED | OUTPATIENT
Start: 2019-05-14 | End: 2019-06-21

## 2019-05-14 RX ORDER — ALPRAZOLAM 0.5 MG/1
TABLET ORAL
Qty: 15 TAB | Refills: 0 | Status: SHIPPED | OUTPATIENT
Start: 2019-05-14 | End: 2019-06-21 | Stop reason: SDUPTHER

## 2019-05-14 NOTE — PROGRESS NOTES
Mireya Parsons is a 54 y.o. female and presents with No chief complaint on file. .  Subjective:  She has been concerned about her 10 pound weight gain    Anxiety review:  Patient complaints of palpitations, chest pain, paresthesias, insomnia, racing thoughts, feelings of losing control, difficulty concentrating has improved,she has a history of panic attacks Treatment includes Xanax and no other therapies. She denies recurrent thoughts of death and suicidal thoughts without plan. She experiences the following side effects from the treatment: none. She has been having increasing palpitations reported  She was treated for a SVT while in the hospital.  She has seen a therapist and states she has a psy referral soon. She was discharged from therapy    She states she has not had a cycle in 3 months      Hypertension Review:  The patient has essential hypertension  Diet and Lifestyle: generally follows a  low sodium diet, exercises sporadically  Home BP Monitoring: is not measured at home. Pertinent ROS:not  taking medications as instructed, no medication side effects noted, no TIA's, no chest pain on exertion, no dyspnea on exertion, no swelling of ankles. COPD REVIEW:  The patient is being seen for follow up of COPD,the patient has been stable  Oxygen: She currently is not on home oxygen therapy. Symptoms: chronic dyspnea: severity = not present: course of sx: stable. Patient uses 2 pillows at night. Patient does continue to smoke cigarettes. Review of Systems  Constitutional: negative for fevers, chills, anorexia and weight loss  Eyes:   negative for visual disturbance and irritation  ENT:   negative for tinnitus,sore throat,nasal congestion,ear pains. hoarseness  Respiratory:  negative for cough, hemoptysis, dyspnea,wheezing  CV:   negative for chest pain, palpitations, lower extremity edema  GI:   negative for nausea, vomiting, diarrhea, abdominal pain,melena  Endo:               negative for polyuria,polydipsia,polyphagia,heat intolerance  Genitourinary: negative for frequency, dysuria and hematuria  Integument:  negative for rash and pruritus  Hematologic:  negative for easy bruising and gum/nose bleeding  Musculoskel: negative for myalgias, arthralgias, back pain, muscle weakness, joint pain  Neurological:  negative for headaches, dizziness, vertigo, memory problems and gait   Behavl/Psych: feelings of anxiety, depression, mood changes reported. Past Medical History:   Diagnosis Date    Anemia 2012    Essential hypertension     HX OTHER MEDICAL     2009 baby put up for adoption, tested + for cocaine with that pregnancy    Hypertension     SVT (supraventricular tachycardia) ([facility] [address] 2018     Past Surgical History:   Procedure Laterality Date    HX CHOLECYSTECTOMY      HX GYN           Social History     Socioeconomic History    Marital status: LEGALLY      Spouse name: Not on file    Number of children: Not on file    Years of education: Not on file    Highest education level: Not on file   Tobacco Use    Smoking status: Current Every Day Smoker     Packs/day: 0.50    Smokeless tobacco: Never Used   Substance and Sexual Activity    Alcohol use: Yes    Drug use: No    Sexual activity: Yes     Birth control/protection: None     Family History   Problem Relation Age of Onset    Hypertension Father      Current Outpatient Medications   Medication Sig Dispense Refill    dilTIAZem (CARDIZEM) 120 mg tablet Si TAB DAILY 30 Tab 6    budesonide-formoterol (SYMBICORT) 160-4.5 mcg/actuation HFAA Take 2 Puffs by inhalation two (2) times a day.  1 Inhaler 4    candesartan (ATACAND) 32 mg tablet TAKE ONE TABLET BY MOUTH EVERY DAY 30 Tab 3    ALPRAZolam (XANAX) 0.5 mg tablet Si tab daily prn 30 Tab 0    candesartan (ATACAND) 32 mg tablet TAKE ONE TABLET BY MOUTH EVERY DAY 30 Tab 0    dilTIAZem CD (CARDIZEM CD) 240 mg ER capsule TAKE ONE CAPSULE BY MOUTH EVERY DAY 30 Cap 0    candesartan (ATACAND) 32 mg tablet Take 1 Tab by mouth daily. 30 Tab 0    hydroCHLOROthiazide (HYDRODIURIL) 25 mg tablet Take 1 Tab by mouth daily. 30 Tab 12    carvedilol (COREG) 3.125 mg tablet Take 1 Tab by mouth two (2) times daily (with meals). (Patient not taking: Reported on 4/12/2019) 60 Tab 6    potassium chloride (KLOR-CON) 10 mEq tablet Take 2 Tabs by mouth daily. (Patient not taking: Reported on 4/12/2019) 39 Tab 12     Allergies   Allergen Reactions    Hydralazine Unknown (comments)     jitteriness    Hydromorphone Itching     morphine    Morphine Itching    Sulfa (Sulfonamide Antibiotics) Nausea and Vomiting       Objective:  Visit Vitals  /84   Pulse 82   Temp 97.7 °F (36.5 °C) (Oral)   Resp 16   Ht 5' 7\" (1.702 m)   Wt 197 lb (89.4 kg)   SpO2 96%   BMI 30.85 kg/m²     Physical Exam:   General appearance - alert, well appearing, and in no distress  Mental status - alert, oriented to person, place, and time  EYE-ROCKY, EOMI, corneas normal, no foreign bodies  ENT-ENT exam normal, no neck nodes or sinus tenderness  Nose - normal and patent, no erythema, discharge or polyps  Mouth - mucous membranes moist, pharynx normal without lesions  Neck - supple, no significant adenopathy   Chest - clear to auscultation, no wheezes, rales or rhonchi, symmetric air entry   Heart - normal rate, regular rhythm, normal S1, S2, no murmurs, rubs, clicks or gallops   Abdomen - soft, nontender, nondistended, no masses or organomegaly  Lymph- no adenopathy palpable  Ext-peripheral pulses normal, no pedal edema, no clubbing or cyanosis  Skin-Warm and dry.  no hyperpigmentation, vitiligo, or suspicious lesions  Neuro -alert, oriented, normal speech, no focal findings or movement disorder noted  Neck-normal C-spine, no tenderness, full ROM without pain  Feet-no nail deformities or callus formation with good pulses noted      Results for orders placed or performed during the hospital encounter of 19   CBC WITH AUTOMATED DIFF   Result Value Ref Range    WBC 11.5 (H) 3.6 - 11.0 K/uL    RBC 4.46 3.80 - 5.20 M/uL    HGB 14.2 11.5 - 16.0 g/dL    HCT 41.7 35.0 - 47.0 %    MCV 93.5 80.0 - 99.0 FL    MCH 31.8 26.0 - 34.0 PG    MCHC 34.1 30.0 - 36.5 g/dL    RDW 12.7 11.5 - 14.5 %    PLATELET 578 294 - 061 K/uL    MPV 10.8 8.9 - 12.9 FL    NRBC 0.0 0  WBC    ABSOLUTE NRBC 0.00 0.00 - 0.01 K/uL    NEUTROPHILS 72 32 - 75 %    LYMPHOCYTES 20 12 - 49 %    MONOCYTES 7 5 - 13 %    EOSINOPHILS 1 0 - 7 %    BASOPHILS 0 0 - 1 %    IMMATURE GRANULOCYTES 0 0.0 - 0.5 %    ABS. NEUTROPHILS 8.2 (H) 1.8 - 8.0 K/UL    ABS. LYMPHOCYTES 2.3 0.8 - 3.5 K/UL    ABS. MONOCYTES 0.8 0.0 - 1.0 K/UL    ABS. EOSINOPHILS 0.1 0.0 - 0.4 K/UL    ABS. BASOPHILS 0.1 0.0 - 0.1 K/UL    ABS. IMM. GRANS. 0.1 (H) 0.00 - 0.04 K/UL    DF AUTOMATED     METABOLIC PANEL, BASIC   Result Value Ref Range    Sodium 138 136 - 145 mmol/L    Potassium 3.5 3.5 - 5.1 mmol/L    Chloride 105 97 - 108 mmol/L    CO2 27 21 - 32 mmol/L    Anion gap 6 5 - 15 mmol/L    Glucose 100 65 - 100 mg/dL    BUN 22 (H) 6 - 20 MG/DL    Creatinine 1.05 (H) 0.55 - 1.02 MG/DL    BUN/Creatinine ratio 21 (H) 12 - 20      GFR est AA >60 >60 ml/min/1.73m2    GFR est non-AA 56 (L) >60 ml/min/1.73m2    Calcium 9.0 8.5 - 10.1 MG/DL       Assessment/Plan:    ICD-10-CM ICD-9-CM    1. Benign essential HTN I58 875.0 METABOLIC PANEL, COMPREHENSIVE   2. Anxiety and depression F41.9 300.00     F32.9 311    3. Chronic obstructive pulmonary disease, unspecified COPD type ([facility] [address] J44.9 496    4. SVT (supraventricular tachycardia) (HCC) I47.1 427.89    5.  Weight gain R63.5 783.1 TSH 3RD GENERATION      T4, FREE      T3 TOTAL     Orders Placed This Encounter    METABOLIC PANEL, COMPREHENSIVE    TSH 3RD GENERATION    T4, FREE    T3 TOTAL    dilTIAZem (CARDIZEM) 120 mg tablet     Sig: Si TAB DAILY     Dispense:  30 Tab     Refill:  6     lose weight, increase physical activity, follow low fat diet, follow low salt diet, continue present plan,Take 81mg aspirin daily    NEEDS GYN REFERRAL  Patient Instructions   MyChart Activation    Thank you for requesting access to [facility]. Please follow the instructions below to securely access and download your online medical record. [facility] allows you to send messages to your doctor, view your test results, renew your prescriptions, schedule appointments, and more. How Do I Sign Up? 1. In your internet browser, go to [email or web address]  2. Click on the First Time User? Click Here link in the Sign In box. You will be redirect to the New Member Sign Up page. 3. Enter your [facility] Access Code exactly as it appears below. You will not need to use this code after youve completed the sign-up process. If you do not sign up before the expiration date, you must request a new code. [facility] Access Code: [email or web address]  Expires: 2019  1:30 PM (This is the date your [facility] access code will )    4. Enter the last four digits of your Social Security Number (xxxx) and Date of Birth (mm/dd/yyyy) as indicated and click Submit. You will be taken to the next sign-up page. 5. Create a [facility] ID. This will be your [facility] login ID and cannot be changed, so think of one that is secure and easy to remember. 6. Create a [facility] password. You can change your password at any time. 7. Enter your Password Reset Question and Answer. This can be used at a later time if you forget your password. 8. Enter your e-mail address. You will receive e-mail notification when new information is available in [address]. 9. Click Sign Up. You can now view and download portions of your medical record. 10. Click the Download Summary menu link to download a portable copy of your medical information. Additional Information    If you have questions, please visit the Frequently Asked Questions section of the [facility] website at [email or web address] Remember, MyChart is NOT to be used for urgent needs. For medical emergencies, dial 911. Follow-up and Dispositions    · Return in about 1 month (around 6/11/2019), or if symptoms worsen or fail to improve. I have reviewed with the patient details of the assessment and plan and all questions were answered. Relevent patient education was performed. The most recent lab findings were reviewed with the patient. An After Visit Summary was printed and given to the patient.

## 2019-05-14 NOTE — PROGRESS NOTES
1. Have you been to the ER, urgent care clinic since your last visit? Hospitalized since your last visit? Yes/[facility]/tooth    2. Have you seen or consulted any other health care providers outside of the [address] [place] [address] since your last visit? Include any pap smears or colon screening. No    3 most recent PHQ Screens 1/18/2019   PHQ Not Done -   Little interest or pleasure in doing things Not at all   Feeling down, depressed, irritable, or hopeless Not at all   Total Score PHQ 2 0   Trouble falling or staying asleep, or sleeping too much -   Feeling tired or having little energy -   Poor appetite, weight loss, or overeating -   Feeling bad about yourself - or that you are a failure or have let yourself or your family down -   Moving or speaking so slowly that other people could have noticed; or the opposite being so fidgety that others notice -   Thoughts of being better off dead, or hurting yourself in some way -   How difficult have these problems made it for you to do your work, take care of your home and get along with others -     Per Dr. Donnia Favre.,  verbal order given for needed amb poc labs.

## 2019-05-14 NOTE — PATIENT INSTRUCTIONS
Braintree Activation    Thank you for requesting access to Braintree. Please follow the instructions below to securely access and download your online medical record. Braintree allows you to send messages to your doctor, view your test results, renew your prescriptions, schedule appointments, and more. How Do I Sign Up? 1. In your internet browser, go to [email or web address]  2. Click on the First Time User? Click Here link in the Sign In box. You will be redirect to the New Member Sign Up page. 3. Enter your Braintree Access Code exactly as it appears below. You will not need to use this code after youve completed the sign-up process. If you do not sign up before the expiration date, you must request a new code. Braintree Access Code: [email or web address]  Expires: 2019  1:30 PM (This is the date your Braintree access code will )    4. Enter the last four digits of your Social Security Number (xxxx) and Date of Birth (mm/dd/yyyy) as indicated and click Submit. You will be taken to the next sign-up page. 5. Create a Braintree ID. This will be your Braintree login ID and cannot be changed, so think of one that is secure and easy to remember. 6. Create a Braintree password. You can change your password at any time. 7. Enter your Password Reset Question and Answer. This can be used at a later time if you forget your password. 8. Enter your e-mail address. You will receive e-mail notification when new information is available in [address]. 9. Click Sign Up. You can now view and download portions of your medical record. 10. Click the Download Summary menu link to download a portable copy of your medical information. Additional Information    If you have questions, please visit the Frequently Asked Questions section of the Braintree website at [email or web address] Remember, Braintree is NOT to be used for urgent needs. For medical emergencies, dial 911.

## 2019-06-21 ENCOUNTER — OFFICE VISIT (OUTPATIENT)
Dept: FAMILY MEDICINE CLINIC | Age: 49
End: 2019-06-21

## 2019-06-21 VITALS
OXYGEN SATURATION: 95 % | HEIGHT: 67 IN | TEMPERATURE: 97.5 F | BODY MASS INDEX: 29.98 KG/M2 | RESPIRATION RATE: 16 BRPM | WEIGHT: 191 LBS | SYSTOLIC BLOOD PRESSURE: 135 MMHG | DIASTOLIC BLOOD PRESSURE: 87 MMHG | HEART RATE: 69 BPM

## 2019-06-21 DIAGNOSIS — Z13.220 SCREENING, LIPID: ICD-10-CM

## 2019-06-21 DIAGNOSIS — F32.A ANXIETY AND DEPRESSION: ICD-10-CM

## 2019-06-21 DIAGNOSIS — Z51.81 MEDICATION MONITORING ENCOUNTER: ICD-10-CM

## 2019-06-21 DIAGNOSIS — R49.0 HOARSENESS: Primary | ICD-10-CM

## 2019-06-21 DIAGNOSIS — J44.9 CHRONIC OBSTRUCTIVE PULMONARY DISEASE, UNSPECIFIED COPD TYPE (HCC): Primary | ICD-10-CM

## 2019-06-21 DIAGNOSIS — Z13.1 SCREENING FOR DIABETES MELLITUS: ICD-10-CM

## 2019-06-21 DIAGNOSIS — F41.9 ANXIETY AND DEPRESSION: ICD-10-CM

## 2019-06-21 DIAGNOSIS — Z12.31 SCREENING MAMMOGRAM, ENCOUNTER FOR: ICD-10-CM

## 2019-06-21 DIAGNOSIS — Z72.0 TOBACCO ABUSE: ICD-10-CM

## 2019-06-21 DIAGNOSIS — I10 BENIGN ESSENTIAL HTN: ICD-10-CM

## 2019-06-21 RX ORDER — BUDESONIDE AND FORMOTEROL FUMARATE DIHYDRATE 160; 4.5 UG/1; UG/1
2 AEROSOL RESPIRATORY (INHALATION) 2 TIMES DAILY
Qty: 1 INHALER | Refills: 4 | Status: SHIPPED | OUTPATIENT
Start: 2019-06-21 | End: 2019-06-24 | Stop reason: SDUPTHER

## 2019-06-21 RX ORDER — HYDROCHLOROTHIAZIDE 25 MG/1
25 TABLET ORAL DAILY
Qty: 90 TAB | Refills: 3 | Status: SHIPPED | OUTPATIENT
Start: 2019-06-21 | End: 2019-06-24 | Stop reason: SDUPTHER

## 2019-06-21 RX ORDER — ALPRAZOLAM 0.5 MG/1
0.5 TABLET ORAL
Qty: 30 TAB | Refills: 2 | Status: SHIPPED | OUTPATIENT
Start: 2019-06-21 | End: 2019-09-06 | Stop reason: ALTCHOICE

## 2019-06-21 NOTE — PATIENT INSTRUCTIONS
Chronic Obstructive Pulmonary Disease (COPD): Care Instructions  Your Care Instructions    Chronic obstructive pulmonary disease (COPD) is a general term for a group of lung diseases, including emphysema and chronic bronchitis. People with COPD have decreased airflow in and out of the lungs, which makes it hard to breathe. The airways also can get clogged with thick mucus. Cigarette smoking is a major cause of COPD. Although there is no cure for COPD, you can slow its progress. Following your treatment plan and taking care of yourself can help you feel better and live longer. Follow-up care is a key part of your treatment and safety. Be sure to make and go to all appointments, and call your doctor if you are having problems. It's also a good idea to know your test results and keep a list of the medicines you take. How can you care for yourself at home?   Staying healthy    · Do not smoke. This is the most important step you can take to prevent more damage to your lungs. If you need help quitting, talk to your doctor about stop-smoking programs and medicines. These can increase your chances of quitting for good.     · Avoid colds and flu. Get a pneumococcal vaccine shot. If you have had one before, ask your doctor whether you need a second dose. Get the flu vaccine every fall. If you must be around people with colds or the flu, wash your hands often.     · Avoid secondhand smoke, air pollution, and high altitudes. Also avoid cold, dry air and hot, humid air. Stay at home with your windows closed when air pollution is bad.    Medicines and oxygen therapy    · Take your medicines exactly as prescribed. Call your doctor if you think you are having a problem with your medicine.     · You may be taking medicines such as:  ? Bronchodilators. These help open your airways and make breathing easier. Bronchodilators are either short-acting (work for 6 to 9 hours) or long-acting (work for 24 hours).  You inhale most bronchodilators, so they start to act quickly. Always carry your quick-relief inhaler with you in case you need it while you are away from home. ? Corticosteroids (prednisone, budesonide). These reduce airway inflammation. They come in pill or inhaled form. You must take these medicines every day for them to work well.     · A spacer may help you get more inhaled medicine to your lungs. Ask your doctor or pharmacist if a spacer is right for you. If it is, ask how to use it properly.     · Do not take any vitamins, over-the-counter medicine, or herbal products without talking to your doctor first.     · If your doctor prescribed antibiotics, take them as directed. Do not stop taking them just because you feel better. You need to take the full course of antibiotics.     · Oxygen therapy boosts the amount of oxygen in your blood and helps you breathe easier. Use the flow rate your doctor has recommended, and do not change it without talking to your doctor first.   Activity    · Get regular exercise. Walking is an easy way to get exercise. Start out slowly, and walk a little more each day.     · Pay attention to your breathing. You are exercising too hard if you cannot talk while you are exercising.     · Take short rest breaks when doing household chores and other activities.     · Learn breathing methods--such as breathing through pursed lips--to help you become less short of breath.     · If your doctor has not set you up with a pulmonary rehabilitation program, talk to him or her about whether rehab is right for you. Rehab includes exercise programs, education about your disease and how to manage it, help with diet and other changes, and emotional support. Diet    · Eat regular, healthy meals. Use bronchodilators about 1 hour before you eat to make it easier to eat. Eat several small meals instead of three large ones.  Drink beverages at the end of the meal. Avoid foods that are hard to chew.     · Eat foods that contain protein so that you do not lose muscle mass.     · Talk with your doctor if you gain too much weight or if you lose weight without trying.    Mental health    · Talk to your family, friends, or a therapist about your feelings. It is normal to feel frightened, angry, hopeless, helpless, and even guilty. Talking openly about bad feelings can help you cope. If these feelings last, talk to your doctor. When should you call for help? Call 911 anytime you think you may need emergency care. For example, call if:    · You have severe trouble breathing.    Call your doctor now or seek immediate medical care if:    · You have new or worse trouble breathing.     · You cough up blood.     · You have a fever.    Watch closely for changes in your health, and be sure to contact your doctor if:    · You cough more deeply or more often, especially if you notice more mucus or a change in the color of your mucus.     · You have new or worse swelling in your legs or belly.     · You are not getting better as expected. Where can you learn more? Go to http://ian-norbert.info/. [place] in the search box to learn more about \"Chronic Obstructive Pulmonary Disease (COPD): Care Instructions. \"  Current as of: September 5, 2018  Content Version: 11.9  © 1471-3561 [employer]. Care instructions adapted under license by [employer] (which disclaims liability or warranty for this information). If you have questions about a medical condition or this instruction, always ask your healthcare professional. [place] [address] any warranty or liability for your use of this information.

## 2019-06-21 NOTE — PROGRESS NOTES
HPI:   Betsey Neal is a 54 y.o. female who presents to [facility]. Anxiety  Patient complains of evaluation of anxiety disorder. She has the following anxiety symptoms: palpitations, sweating, racing thoughts. Onset of symptoms was approximately 10 years ago, controlled since that time. She denies current suicidal and homicidal ideation. Family history significant for depression. Possible organic causes contributing are: none. Risk factors: previous episode of depression Previous treatment includes Wellbutrin, Buspar, Lexapro and no other therapies. She complains of the following side effects from the treatment: none. She is using Xanax roughly once daily with benefit  She reports her symptoms to be well controlled on xanax at this time. She is a daily smoker. She is working towards cessation. She has a history of cocaine use. She has been sober for 6 months. She is involved in rehabilitation at least three times weekly. She recently started exercising. 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 She was taking Coreg for management. She has had a full resolution with the cessation of cocaine and has discontinued Coreg several months ago. Current Outpatient Medications   Medication Sig Dispense Refill    hydroCHLOROthiazide (HYDRODIURIL) 25 mg tablet Take 1 Tab by mouth daily. 90 Tab 3    budesonide-formoterol (SYMBICORT) 160-4.5 mcg/actuation HFAA Take 2 Puffs by inhalation two (2) times a day. 1 Inhaler 4    ALPRAZolam (XANAX) 0.5 mg tablet Take 1 Tab by mouth daily as needed for Anxiety. Si tab daily prn 30 Tab 2    candesartan (ATACAND) 32 mg tablet Take 1 Tab by mouth daily.  30 Tab 0      Allergies   Allergen Reactions    Hydralazine Unknown (comments)     jitteriness    Hydromorphone Itching     morphine    Morphine Itching    Sulfa (Sulfonamide Antibiotics) Nausea and Vomiting      Patient Active Problem List   Diagnosis Code    Anemia D64.9  Anxiety F41.9    COPD (chronic obstructive pulmonary disease) ([facility]) J44.9    Essential hypertension I10    SVT (supraventricular tachycardia) ([facility]) I47.1    Tobacco abuse Z72.0     Past Medical History:   Diagnosis Date    Anemia 2012    Cocaine abuse affecting [facility] ([facility]     Essential hypertension     Hypertension     [facility] 2018      Past Surgical History:   Procedure Laterality Date    HX CHOLECYSTECTOMY      HX GYN            Patient's last menstrual period was 05/15/2019 (approximate). Family History   Problem Relation Age of Onset    Hypertension Father     Alcohol abuse Father     Breast Cancer Sister     Cancer Brother         Throat versus Thyroid    No Known Problems Son     No Known Problems Son     No Known Problems Daughter       Social History     Socioeconomic History    Marital status: LEGALLY      Spouse name: Not on file    Number of children: Not on file    Years of education: Not on file    Highest education level: Not on file   Occupational History    Not on file   Social Needs    Financial resource strain: Not on file    Food insecurity:     Worry: Not on file     Inability: Not on file    Transportation needs:     Medical: Not on file     Non-medical: Not on file   Tobacco Use    Smoking status: Current Every Day Smoker     Packs/day: 0.50    Smokeless tobacco: Never Used   Substance and Sexual Activity    Alcohol use:  Yes    Drug use: Not Currently     Comment: Cocaine in past     Sexual activity: Yes     Birth control/protection: None   Lifestyle    Physical activity:     Days per week: Not on file     Minutes per session: Not on file    Stress: Not on file   Relationships    Social connections:     Talks on phone: Not on file     Gets together: Not on file     Attends Tenriism service: Not on file     Active member of club or organization: Not on file     Attends meetings of clubs or organizations: Not on file     Relationship status: Not on file    Intimate partner violence:     Fear of current or ex partner: Not on file     Emotionally abused: Not on file     Physically abused: Not on file     Forced sexual activity: Not on file   Other Topics Concern    Not on file   Social History Narrative    Not on file        ROS:   Review of Systems   Constitutional: Negative for fever, malaise/fatigue and weight loss. HENT: Negative for hearing loss. Eyes: Negative for blurred vision and pain. Respiratory: Negative for cough and shortness of breath. Cardiovascular: Negative for chest pain, palpitations and leg swelling. Gastrointestinal: Negative for abdominal pain, blood in stool, constipation, diarrhea and melena. Genitourinary: Negative for dysuria and hematuria. Musculoskeletal: Negative for joint pain. Skin: Negative for rash. Neurological: Positive for dizziness. Negative for headaches. Psychiatric/Behavioral: Negative for depression. The patient is not nervous/anxious and does not have insomnia. Physical Exam:     Visit Vitals  /87   Pulse 69   Temp 97.5 °F (36.4 °C) (Oral)   Resp 16   Ht 5' 7\" (1.702 m)   Wt 191 lb (86.6 kg)   LMP 05/15/2019 (Approximate)   SpO2 95%   BMI 29.91 kg/m²        Vitals and Nurse Documentation reviewed. Physical Exam   Constitutional: No distress. HENT:   Right Ear: No drainage. Tympanic membrane is not injected, not erythematous and not bulging. No middle ear effusion. Left Ear: No drainage. Tympanic membrane is not injected, not erythematous and not bulging. No middle ear effusion. Nose: No mucosal edema or rhinorrhea. Mouth/Throat: Normal dentition. No oropharyngeal exudate, posterior oropharyngeal erythema or tonsillar abscesses. Eyes: Pupils are equal, round, and reactive to light. Neck: No JVD present. Carotid bruit is not present. No tracheal deviation present. No thyroid mass and no thyromegaly present. Cardiovascular: S1 normal and S2 normal. Exam reveals no gallop and no friction rub. No murmur heard. Pulses:       Dorsalis pedis pulses are 2+ on the right side, and 2+ on the left side. Posterior tibial pulses are 2+ on the right side, and 2+ on the left side. Pulmonary/Chest: Breath sounds normal. She has no wheezes. Abdominal: Soft. Bowel sounds are normal. She exhibits no distension and no mass. There is no hepatosplenomegaly. There is no tenderness. Lymphadenopathy:     She has no cervical adenopathy. She has no axillary adenopathy. Neurological: She has normal motor skills, normal sensation and normal strength. No cranial nerve deficit. Gait normal.   Skin: Skin is warm and dry. Psychiatric: Mood, memory, affect and judgment normal.         Assessment/ Plan:   [employer] were discussed including hours of operation, on-call providers, and MyChart. Diagnoses and all orders for this visit:    Diagnoses and all orders for this visit:    1. Chronic obstructive pulmonary disease, unspecified COPD type (HCC)  -     budesonide-formoterol (SYMBICORT) 160-4.5 mcg/actuation HFAA; Take 2 Puffs by inhalation two (2) times a day. -     PULMONARY FUNCTION TEST; Future  She is not currently taking Symbicort daily. PFT pending. She continues to work on smoking cessation. 2. Benign essential HTN  -     hydroCHLOROthiazide (HYDRODIURIL) 25 mg tablet; Take 1 Tab by mouth daily.  -     METABOLIC PANEL, COMPREHENSIVE  Stable on current therapy although some vertigo. Consider carotid duplex if no improvement. 3. Anxiety and depression  -     ALPRAZolam (XANAX) 0.5 mg tablet; Take 1 Tab by mouth daily as needed for Anxiety. Si tab daily prn  Continue current therapy of one xanax daily which was refilled for three months.  reviewed and appropriate. We discussed a long term goal of moving towards preventative therapy.      4. Medication monitoring encounter  -     COMPLIANCE DRUG SCREEN/PRESCRIPTION MONITORING    5. Screening mammogram, encounter for  -     [facility] MAMMO BI SCREENING INCL CAD; Future    6.  Screening, lipid  -     LIPID PANEL    7. Screening for diabetes mellitus  -     HEMOGLOBIN A1C WITH EAG          Discussed expected course/resolution/complications of diagnosis in detail with patient.    Medication risks/benefits/costs/interactions/alternatives discussed with patient.    Pt was given an after visit summary which includes diagnoses, current medications & vitals.    Pt expressed understanding with the diagnosis and plan      Follow-up and Dispositions    · Return in about 3 months (around 9/21/2019) for Complete Physical.

## 2019-06-21 NOTE — PROGRESS NOTES
Chief Complaint   Patient presents with   [facility] Establish Care    Medication Refill     1. Have you been to the ER, urgent care clinic since your last visit? Hospitalized since your last visit? No    2. Have you seen or consulted any other health care providers outside of the [facility] [place] [facility]"" since your last visit? Include any pap smears or colon screening.  No      Called prescription for Xanax to [address] [place] [address] per [facility] request.

## 2019-06-24 DIAGNOSIS — J44.9 CHRONIC OBSTRUCTIVE PULMONARY DISEASE, UNSPECIFIED COPD TYPE (HCC): ICD-10-CM

## 2019-06-24 DIAGNOSIS — I10 BENIGN ESSENTIAL HTN: ICD-10-CM

## 2019-06-24 NOTE — TELEPHONE ENCOUNTER
Outbound call to patient. Patient states that if provider would like to wait she is ok with it, but she has only has another week on her hydrochlorothiazide medication.

## 2019-06-24 NOTE — TELEPHONE ENCOUNTER
Patient states that this medication was suppose to go to [facility] and it was sent to the wrong pharmacy  . Requested Prescriptions     Pending Prescriptions Disp Refills    budesonide-formoterol (SYMBICORT) 160-4.5 mcg/actuation HFAA 1 Inhaler 4     Sig: Take 2 Puffs by inhalation two (2) times a day.  hydroCHLOROthiazide (HYDRODIURIL) 25 mg tablet 90 Tab 3     Sig: Take 1 Tab by mouth daily.  candesartan (ATACAND) 32 mg tablet 30 Tab 0     Sig: Take 1 Tab by mouth daily.      Pharmacy Angelo verified  [address] [place] [address] ; June 21, 2019  [facility]# [phone]

## 2019-06-25 RX ORDER — HYDROCHLOROTHIAZIDE 25 MG/1
25 TABLET ORAL DAILY
Qty: 90 TAB | Refills: 3 | Status: SHIPPED | OUTPATIENT
Start: 2019-06-25 | End: 2019-09-06 | Stop reason: ALTCHOICE

## 2019-06-25 RX ORDER — BUDESONIDE AND FORMOTEROL FUMARATE DIHYDRATE 160; 4.5 UG/1; UG/1
2 AEROSOL RESPIRATORY (INHALATION) 2 TIMES DAILY
Qty: 1 INHALER | Refills: 4 | Status: SHIPPED | OUTPATIENT
Start: 2019-06-25 | End: 2020-02-28 | Stop reason: SDUPTHER

## 2019-06-25 RX ORDER — CANDESARTAN 32 MG/1
32 TABLET ORAL DAILY
Qty: 30 TAB | Refills: 0 | Status: SHIPPED | OUTPATIENT
Start: 2019-06-25 | End: 2019-07-25 | Stop reason: SDUPTHER

## 2019-06-27 LAB — DRUGS UR: NORMAL

## 2019-07-03 ENCOUNTER — HOSPITAL ENCOUNTER (EMERGENCY)
Age: 49
Discharge: HOME | End: 2019-07-03
Attending: EMERGENCY MEDICINE | Admitting: EMERGENCY MEDICINE
Payer: COMMERCIAL

## 2019-07-03 ENCOUNTER — APPOINTMENT (OUTPATIENT)
Dept: CT IMAGING | Age: 49
End: 2019-07-03
Attending: EMERGENCY MEDICINE
Payer: COMMERCIAL

## 2019-07-03 VITALS
RESPIRATION RATE: 18 BRPM | DIASTOLIC BLOOD PRESSURE: 72 MMHG | SYSTOLIC BLOOD PRESSURE: 117 MMHG | HEIGHT: 67 IN | BODY MASS INDEX: 29.52 KG/M2 | HEART RATE: 71 BPM | WEIGHT: 188.05 LBS | OXYGEN SATURATION: 99 % | TEMPERATURE: 98.4 F

## 2019-07-03 DIAGNOSIS — R10.9 ACUTE ABDOMINAL PAIN: Primary | ICD-10-CM

## 2019-07-03 DIAGNOSIS — E87.6 ACUTE HYPOKALEMIA: ICD-10-CM

## 2019-07-03 DIAGNOSIS — R11.0 NAUSEA WITHOUT VOMITING: ICD-10-CM

## 2019-07-03 DIAGNOSIS — Z72.0 TOBACCO ABUSE: ICD-10-CM

## 2019-07-03 DIAGNOSIS — N94.9 ADNEXAL CYST: ICD-10-CM

## 2019-07-03 LAB
ALBUMIN SERPL-MCNC: 4.2 G/DL (ref 3.5–5)
ALBUMIN/GLOB SERPL: 1.1 {RATIO} (ref 1.1–2.2)
ALP SERPL-CCNC: 70 U/L (ref 45–117)
ALT SERPL-CCNC: 39 U/L (ref 12–78)
ANION GAP SERPL CALC-SCNC: 7 MMOL/L (ref 5–15)
APPEARANCE UR: CLEAR
AST SERPL-CCNC: 28 U/L (ref 15–37)
BACTERIA URNS QL MICRO: NEGATIVE /HPF
BASOPHILS # BLD: 0.1 K/UL (ref 0–0.1)
BASOPHILS NFR BLD: 1 % (ref 0–1)
BILIRUB SERPL-MCNC: 0.4 MG/DL (ref 0.2–1)
BILIRUB UR QL: NEGATIVE
BUN SERPL-MCNC: 23 MG/DL (ref 6–20)
BUN/CREAT SERPL: 15 (ref 12–20)
CALCIUM SERPL-MCNC: 9.1 MG/DL (ref 8.5–10.1)
CHLORIDE SERPL-SCNC: 103 MMOL/L (ref 97–108)
CO2 SERPL-SCNC: 28 MMOL/L (ref 21–32)
COLOR UR: ABNORMAL
COMMENT, HOLDF: NORMAL
CREAT SERPL-MCNC: 1.53 MG/DL (ref 0.55–1.02)
DIFFERENTIAL METHOD BLD: NORMAL
EOSINOPHIL # BLD: 0.2 K/UL (ref 0–0.4)
EOSINOPHIL NFR BLD: 2 % (ref 0–7)
EPITH CASTS URNS QL MICRO: ABNORMAL /LPF
ERYTHROCYTE [DISTWIDTH] IN BLOOD BY AUTOMATED COUNT: 12.7 % (ref 11.5–14.5)
GLOBULIN SER CALC-MCNC: 4 G/DL (ref 2–4)
GLUCOSE SERPL-MCNC: 112 MG/DL (ref 65–100)
GLUCOSE UR STRIP.AUTO-MCNC: NEGATIVE MG/DL
HCG UR QL: NEGATIVE
HCT VFR BLD AUTO: 45.6 % (ref 35–47)
HGB BLD-MCNC: 16 G/DL (ref 11.5–16)
HGB UR QL STRIP: NEGATIVE
HYALINE CASTS URNS QL MICRO: ABNORMAL /LPF (ref 0–5)
IMM GRANULOCYTES # BLD AUTO: 0 K/UL (ref 0–0.04)
IMM GRANULOCYTES NFR BLD AUTO: 0 % (ref 0–0.5)
KETONES UR QL STRIP.AUTO: NEGATIVE MG/DL
LACTATE BLD-SCNC: 1.13 MMOL/L (ref 0.4–2)
LEUKOCYTE ESTERASE UR QL STRIP.AUTO: ABNORMAL
LIPASE SERPL-CCNC: 204 U/L (ref 73–393)
LYMPHOCYTES # BLD: 3 K/UL (ref 0.8–3.5)
LYMPHOCYTES NFR BLD: 31 % (ref 12–49)
MCH RBC QN AUTO: 32.5 PG (ref 26–34)
MCHC RBC AUTO-ENTMCNC: 35.1 G/DL (ref 30–36.5)
MCV RBC AUTO: 92.5 FL (ref 80–99)
MONOCYTES # BLD: 0.7 K/UL (ref 0–1)
MONOCYTES NFR BLD: 7 % (ref 5–13)
NEUTS SEG # BLD: 5.9 K/UL (ref 1.8–8)
NEUTS SEG NFR BLD: 59 % (ref 32–75)
NITRITE UR QL STRIP.AUTO: NEGATIVE
NRBC # BLD: 0 K/UL (ref 0–0.01)
NRBC BLD-RTO: 0 PER 100 WBC
PH UR STRIP: 5.5 [PH] (ref 5–8)
PLATELET # BLD AUTO: 332 K/UL (ref 150–400)
PMV BLD AUTO: 11.4 FL (ref 8.9–12.9)
POTASSIUM SERPL-SCNC: 3.4 MMOL/L (ref 3.5–5.1)
PROT SERPL-MCNC: 8.2 G/DL (ref 6.4–8.2)
PROT UR STRIP-MCNC: NEGATIVE MG/DL
RBC # BLD AUTO: 4.93 M/UL (ref 3.8–5.2)
RBC #/AREA URNS HPF: ABNORMAL /HPF (ref 0–5)
SAMPLES BEING HELD,HOLD: NORMAL
SODIUM SERPL-SCNC: 138 MMOL/L (ref 136–145)
SP GR UR REFRACTOMETRY: 1.01 (ref 1–1.03)
UA: UC IF INDICATED,UAUC: ABNORMAL
UROBILINOGEN UR QL STRIP.AUTO: 0.2 EU/DL (ref 0.2–1)
WBC # BLD AUTO: 9.8 K/UL (ref 3.6–11)
WBC URNS QL MICRO: ABNORMAL /HPF (ref 0–4)

## 2019-07-03 PROCEDURE — 96361 HYDRATE IV INFUSION ADD-ON: CPT

## 2019-07-03 PROCEDURE — 85025 COMPLETE CBC W/AUTO DIFF WBC: CPT

## 2019-07-03 PROCEDURE — 74011250636 HC RX REV CODE- 250/636: Performed by: EMERGENCY MEDICINE

## 2019-07-03 PROCEDURE — 83690 ASSAY OF LIPASE: CPT

## 2019-07-03 PROCEDURE — 87040 BLOOD CULTURE FOR BACTERIA: CPT

## 2019-07-03 PROCEDURE — 80053 COMPREHEN METABOLIC PANEL: CPT

## 2019-07-03 PROCEDURE — 81025 URINE PREGNANCY TEST: CPT

## 2019-07-03 PROCEDURE — 96375 TX/PRO/DX INJ NEW DRUG ADDON: CPT

## 2019-07-03 PROCEDURE — 83605 ASSAY OF LACTIC ACID: CPT

## 2019-07-03 PROCEDURE — 99285 EMERGENCY DEPT VISIT HI MDM: CPT

## 2019-07-03 PROCEDURE — 74011250637 HC RX REV CODE- 250/637: Performed by: EMERGENCY MEDICINE

## 2019-07-03 PROCEDURE — 36415 COLL VENOUS BLD VENIPUNCTURE: CPT

## 2019-07-03 PROCEDURE — 81001 URINALYSIS AUTO W/SCOPE: CPT

## 2019-07-03 PROCEDURE — 74176 CT ABD & PELVIS W/O CONTRAST: CPT

## 2019-07-03 PROCEDURE — 96374 THER/PROPH/DIAG INJ IV PUSH: CPT

## 2019-07-03 RX ORDER — DICYCLOMINE HYDROCHLORIDE 20 MG/1
20 TABLET ORAL EVERY 6 HOURS
Qty: 20 TAB | Refills: 0 | Status: SHIPPED | OUTPATIENT
Start: 2019-07-03 | End: 2019-07-08

## 2019-07-03 RX ORDER — DIAZEPAM 5 MG/1
5 TABLET ORAL
Status: COMPLETED | OUTPATIENT
Start: 2019-07-03 | End: 2019-07-03

## 2019-07-03 RX ORDER — KETOROLAC TROMETHAMINE 30 MG/ML
30 INJECTION, SOLUTION INTRAMUSCULAR; INTRAVENOUS
Status: COMPLETED | OUTPATIENT
Start: 2019-07-03 | End: 2019-07-03

## 2019-07-03 RX ORDER — ONDANSETRON 4 MG/1
4 TABLET, ORALLY DISINTEGRATING ORAL
Qty: 20 TAB | Refills: 0 | Status: SHIPPED | OUTPATIENT
Start: 2019-07-03 | End: 2019-10-29

## 2019-07-03 RX ORDER — OXYCODONE AND ACETAMINOPHEN 5; 325 MG/1; MG/1
1 TABLET ORAL
Status: COMPLETED | OUTPATIENT
Start: 2019-07-03 | End: 2019-07-03

## 2019-07-03 RX ORDER — NAPROXEN 500 MG/1
500 TABLET ORAL 2 TIMES DAILY WITH MEALS
Qty: 20 TAB | Refills: 0 | Status: SHIPPED | OUTPATIENT
Start: 2019-07-03 | End: 2019-08-20 | Stop reason: ALTCHOICE

## 2019-07-03 RX ORDER — ONDANSETRON 2 MG/ML
4 INJECTION INTRAMUSCULAR; INTRAVENOUS
Status: COMPLETED | OUTPATIENT
Start: 2019-07-03 | End: 2019-07-03

## 2019-07-03 RX ORDER — FENTANYL CITRATE 50 UG/ML
100 INJECTION, SOLUTION INTRAMUSCULAR; INTRAVENOUS
Status: COMPLETED | OUTPATIENT
Start: 2019-07-03 | End: 2019-07-03

## 2019-07-03 RX ADMIN — ONDANSETRON 4 MG: 2 INJECTION INTRAMUSCULAR; INTRAVENOUS at 16:57

## 2019-07-03 RX ADMIN — FENTANYL CITRATE 100 MCG: 50 INJECTION, SOLUTION INTRAMUSCULAR; INTRAVENOUS at 17:49

## 2019-07-03 RX ADMIN — SODIUM CHLORIDE 1000 ML: 900 INJECTION, SOLUTION INTRAVENOUS at 16:57

## 2019-07-03 RX ADMIN — DIAZEPAM 5 MG: 5 TABLET ORAL at 20:25

## 2019-07-03 RX ADMIN — OXYCODONE AND ACETAMINOPHEN 1 TABLET: 5; 325 TABLET ORAL at 20:25

## 2019-07-03 RX ADMIN — KETOROLAC TROMETHAMINE 30 MG: 30 INJECTION, SOLUTION INTRAMUSCULAR at 16:56

## 2019-07-03 NOTE — LETTER
[address] 
[facility] EMERGENCY DEPT 
[address] [id number] 
[phone] Work/School Note Date: 7/3/2019 To Whom It May concern: 
 
Ricardo Tamez was seen and treated today in the emergency room by the following provider(s): 
Attending Provider: Lester Wilde MD.   
 
Ricardo Tamez may return to work on 7/5/19 Sincerely, Mary Alegria

## 2019-07-04 NOTE — DISCHARGE INSTRUCTIONS
Thank you for allowing us to take care of you today! We hope we addressed all of your concerns and needs. We strive to provide excellent quality care in the Emergency Department. You will receive a survey after your visit to evaluate the care you were provided. Should you receive a survey from us, we invite you to share your experience and tell us what made it excellent. It was a pleasure serving you, we invite you to share your experience with us, in our pursuit for excellence, should you be selected to receive a survey. The exam and treatment you received in the Emergency Department were for an urgent problem and are not intended as complete care. It is important that you follow up with a doctor, nurse practitioner, or physician assistant for ongoing care. If your symptoms become worse or you do not improve as expected and you are unable to reach your usual health care provider, you should return to the Emergency Department. We are available 24 hours a day. Please take your discharge instructions with you when you go to your follow-up appointment. If you have any problem arranging a follow-up appointment, contact the Emergency Department immediately. If a prescription has been provided, please have it filled as soon as possible to prevent a delay in treatment. Read the entire medication instruction sheet provided to you by the pharmacy. If you have any questions or reservations about taking the medication due to side effects or interactions with other medications, please call your primary care physician or contact the ER to speak with the charge nurse. Make an appointment with your family doctor or the physician you were referred to for follow-up of this visit as instructed on your discharge paperwork, as this is mandatory follow-up. Return to the ER if you are unable to be seen or if you are unable to be seen in a timely manner.     If you have any problem arranging the follow-up visit, contact the Emergency Department immediately. I hope you feel better and thank you again for allow us to provide you with excellent care today at [facility]! Warmest regards,    Gisselle Mendoza MD  Emergency Medicine Physician  [facility]              Patient Education        Abdominal Pain: Care Instructions  Your Care Instructions    Abdominal pain has many possible causes. Some aren't serious and get better on their own in a few days. Others need more testing and treatment. If your pain continues or gets worse, you need to be rechecked and may need more tests to find out what is wrong. You may need surgery to correct the problem. Don't ignore new symptoms, such as fever, nausea and vomiting, urination problems, pain that gets worse, and dizziness. These may be signs of a more serious problem. Your doctor may have recommended a follow-up visit in the next 8 to 12 hours. If you are not getting better, you may need more tests or treatment. The doctor has checked you carefully, but problems can develop later. If you notice any problems or new symptoms, get medical treatment right away. Follow-up care is a key part of your treatment and safety. Be sure to make and go to all appointments, and call your doctor if you are having problems. It's also a good idea to know your test results and keep a list of the medicines you take. How can you care for yourself at home? · Rest until you feel better. · To prevent dehydration, drink plenty of fluids, enough so that your urine is light yellow or clear like water. Choose water and other caffeine-free clear liquids until you feel better. If you have kidney, heart, or liver disease and have to limit fluids, talk with your doctor before you increase the amount of fluids you drink. · If your stomach is upset, eat mild foods, such as rice, dry toast or crackers, bananas, and applesauce.  Try eating several small meals instead of two or three large ones. · Wait until 48 hours after all symptoms have gone away before you have spicy foods, alcohol, and drinks that contain caffeine. · Do not eat foods that are high in fat. · Avoid anti-inflammatory medicines such as aspirin, ibuprofen (Advil, Motrin), and naproxen (Aleve). These can cause stomach upset. Talk to your doctor if you take daily aspirin for another health problem. When should you call for help? Call 911 anytime you think you may need emergency care. For example, call if:    · You passed out (lost consciousness).     · You pass maroon or very bloody stools.     · You vomit blood or what looks like coffee grounds.     · You have new, severe belly pain.    Call your doctor now or seek immediate medical care if:    · Your pain gets worse, especially if it becomes focused in one area of your belly.     · You have a new or higher fever.     · Your stools are black and look like tar, or they have streaks of blood.     · You have unexpected vaginal bleeding.     · You have symptoms of a urinary tract infection. These may include:  ? Pain when you urinate. ? Urinating more often than usual.  ? Blood in your urine.     · You are dizzy or lightheaded, or you feel like you may faint.    Watch closely for changes in your health, and be sure to contact your doctor if:    · You are not getting better after 1 day (24 hours). Where can you learn more? Go to http://ian-norbert.info/. Enter F942 in the search box to learn more about \"Abdominal Pain: Care Instructions. \"  Current as of: September 23, 2018  Content Version: 11.9  © 6012-7647 [employer]. Care instructions adapted under license by [employer] (which disclaims liability or warranty for this information).  If you have questions about a medical condition or this instruction, always ask your healthcare professional. [place] any warranty or liability for your use of this information.

## 2019-07-04 NOTE — ED PROVIDER NOTES
EMERGENCY DEPARTMENT HISTORY AND PHYSICAL EXAM      Date: 7/3/2019  Patient Name: Abebe Irvin  Patient Age and Sex: 54 y.o. female    History of Presenting Illness     Chief Complaint   Patient presents with    Abdominal Pain     Ambulatory c/o R lower back pain radiating into RUQ and mid abd x Monday night +nausea       History Provided By: Patient    HPI: Abebe Irvin, 54 y.o. female with past medical history as documented below presents to the ED with c/o of acute onset of right flank and right upper abdominal pain with associated nausea. Pt reports sx's have been ongoing for the past two days. Pt reports taking OTC pain medications with minimal relief. She reports associated nausea but no emesis. Pt reports at its most severe, the pain was 8/10 pain but currently only 4/10 pain. Pt denies any other alleviating or exacerbating factors. Additionally, pt specifically denies any recent fever, chills, headache, CP, SOB, lightheadedness, dizziness, numbness, weakness, BLE swelling, heart palpitations, urinary sxs, diarrhea, constipation, melena, hematochezia, cough, or congestion. PCP: Arnel Valente, NP    There are no other complaints, changes or physical findings at this time.      Past History   Past Medical History:  Past Medical History:   Diagnosis Date    Anemia 2012    Cocaine abuse affecting pregnancy ([address]     Essential hypertension     Hypertension     SVT (supraventricular tachycardia) ([address] [address] 2018       Past Surgical History:  Past Surgical History:   Procedure Laterality Date    HX CHOLECYSTECTOMY      HX GYN             Family History:  Family History   Problem Relation Age of Onset    Hypertension Father     Alcohol abuse Father     Breast Cancer Sister     Cancer Brother         Throat versus Thyroid    No Known Problems Son     No Known Problems Son     No Known Problems Daughter        Social History:  Social History     Tobacco Use    Smoking status: Current Every Day Smoker     Packs/day: 0.50    Smokeless tobacco: Never Used   Substance Use Topics    Alcohol use: Yes    Drug use: Not Currently     Comment: Cocaine in past        Allergies: Allergies   Allergen Reactions    Hydralazine Unknown (comments)     jitteriness    Hydromorphone Itching     morphine    Morphine Itching    Sulfa (Sulfonamide Antibiotics) Nausea and Vomiting       Current Medications:  No current facility-administered medications on file prior to encounter. Current Outpatient Medications on File Prior to Encounter   Medication Sig Dispense Refill    budesonide-formoterol (SYMBICORT) 160-4.5 mcg/actuation HFAA Take 2 Puffs by inhalation two (2) times a day. 1 Inhaler 4    hydroCHLOROthiazide (HYDRODIURIL) 25 mg tablet Take 1 Tab by mouth daily. 90 Tab 3    candesartan (ATACAND) 32 mg tablet Take 1 Tab by mouth daily. 30 Tab 0    ALPRAZolam (XANAX) 0.5 mg tablet Take 1 Tab by mouth daily as needed for Anxiety. Si tab daily prn 30 Tab 2       Review of Systems   Review of Systems   Constitutional: Negative. Negative for chills and fever. HENT: Negative. Negative for congestion, facial swelling, rhinorrhea, sore throat, trouble swallowing and voice change. Eyes: Negative. Respiratory: Negative. Negative for apnea, cough, chest tightness, shortness of breath and wheezing. Cardiovascular: Negative. Negative for chest pain, palpitations and leg swelling. Gastrointestinal: Positive for abdominal pain and nausea. Negative for abdominal distention, blood in stool, constipation, diarrhea and vomiting. Endocrine: Negative. Negative for cold intolerance, heat intolerance and polyuria. Genitourinary: Negative. Negative for difficulty urinating, dysuria, flank pain, frequency, hematuria and urgency. Musculoskeletal: Negative. Negative for arthralgias, back pain, myalgias, neck pain and neck stiffness. Skin: Negative.   Negative for color change and rash.   Neurological: Negative. Negative for dizziness, syncope, facial asymmetry, speech difficulty, weakness, light-headedness, numbness and headaches. Hematological: Negative. Does not bruise/bleed easily. Psychiatric/Behavioral: Negative. Negative for confusion and self-injury. The patient is not nervous/anxious. Physical Exam   Physical Exam   Constitutional: She is oriented to person, place, and time. She appears well-developed and well-nourished. No distress. HENT:   Head: Normocephalic and atraumatic. Mouth/Throat: Oropharynx is clear and moist. No oropharyngeal exudate. Eyes: Pupils are equal, round, and reactive to light. Conjunctivae and EOM are normal.   Neck: Normal range of motion. Cardiovascular: Normal rate, regular rhythm and normal heart sounds. Exam reveals no gallop and no friction rub. No murmur heard. Pulmonary/Chest: Effort normal and breath sounds normal. No respiratory distress. She has no wheezes. She has no rales. She exhibits no tenderness. Abdominal: Soft. Bowel sounds are normal. She exhibits no distension and no mass. There is no tenderness. There is no rebound and no guarding. Musculoskeletal: Normal range of motion. She exhibits no edema, tenderness or deformity. Neurological: She is alert and oriented to person, place, and time. She displays normal reflexes. No cranial nerve deficit. She exhibits normal muscle tone. Coordination normal.   Skin: Skin is warm. No rash noted. She is not diaphoretic. Psychiatric: She has a normal mood and affect. Nursing note and vitals reviewed.       Diagnostic Study Results     Labs -  Recent Results (from the past 24 hour(s))   CBC WITH AUTOMATED DIFF    Collection Time: 07/03/19  4:32 PM   Result Value Ref Range    WBC 9.8 3.6 - 11.0 K/uL    RBC 4.93 3.80 - 5.20 M/uL    HGB 16.0 11.5 - 16.0 g/dL    HCT 45.6 35.0 - 47.0 %    MCV 92.5 80.0 - 99.0 FL    MCH 32.5 26.0 - 34.0 PG    MCHC 35.1 30.0 - 36.5 g/dL    RDW 12.7 11.5 - 14.5 %    PLATELET 263 435 - 876 K/uL    MPV 11.4 8.9 - 12.9 FL    NRBC 0.0 0  WBC    ABSOLUTE NRBC 0.00 0.00 - 0.01 K/uL    NEUTROPHILS 59 32 - 75 %    LYMPHOCYTES 31 12 - 49 %    MONOCYTES 7 5 - 13 %    EOSINOPHILS 2 0 - 7 %    BASOPHILS 1 0 - 1 %    IMMATURE GRANULOCYTES 0 0.0 - 0.5 %    ABS. NEUTROPHILS 5.9 1.8 - 8.0 K/UL    ABS. LYMPHOCYTES 3.0 0.8 - 3.5 K/UL    ABS. MONOCYTES 0.7 0.0 - 1.0 K/UL    ABS. EOSINOPHILS 0.2 0.0 - 0.4 K/UL    ABS. BASOPHILS 0.1 0.0 - 0.1 K/UL    ABS. IMM. GRANS. 0.0 0.00 - 0.04 K/UL    DF AUTOMATED     METABOLIC PANEL, COMPREHENSIVE    Collection Time: 07/03/19  4:32 PM   Result Value Ref Range    Sodium 138 136 - 145 mmol/L    Potassium 3.4 (L) 3.5 - 5.1 mmol/L    Chloride 103 97 - 108 mmol/L    CO2 28 21 - 32 mmol/L    Anion gap 7 5 - 15 mmol/L    Glucose 112 (H) 65 - 100 mg/dL    BUN 23 (H) 6 - 20 MG/DL    Creatinine 1.53 (H) 0.55 - 1.02 MG/DL    BUN/Creatinine ratio 15 12 - 20      GFR est AA 44 (L) >60 ml/min/1.73m2    GFR est non-AA 36 (L) >60 ml/min/1.73m2    Calcium 9.1 8.5 - 10.1 MG/DL    Bilirubin, total 0.4 0.2 - 1.0 MG/DL    ALT (SGPT) 39 12 - 78 U/L    AST (SGOT) 28 15 - 37 U/L    Alk. phosphatase 70 45 - 117 U/L    Protein, total 8.2 6.4 - 8.2 g/dL    Albumin 4.2 3.5 - 5.0 g/dL    Globulin 4.0 2.0 - 4.0 g/dL    A-G Ratio 1.1 1.1 - 2.2     LIPASE    Collection Time: 07/03/19  4:32 PM   Result Value Ref Range    Lipase 204 73 - 393 U/L   SAMPLES BEING HELD    Collection Time: 07/03/19  4:34 PM   Result Value Ref Range    SAMPLES BEING HELD 1BLUE     COMMENT        Add-on orders for these samples will be processed based on acceptable specimen integrity and analyte stability, which may vary by analyte.    URINALYSIS W/ REFLEX CULTURE    Collection Time: 07/03/19  4:39 PM   Result Value Ref Range    Color YELLOW/STRAW      Appearance CLEAR CLEAR      Specific gravity 1.009 1.003 - 1.030      pH (UA) 5.5 5.0 - 8.0      Protein NEGATIVE  NEG mg/dL    Glucose NEGATIVE  NEG mg/dL    Ketone NEGATIVE  NEG mg/dL    Bilirubin NEGATIVE  NEG      Blood NEGATIVE  NEG      Urobilinogen 0.2 0.2 - 1.0 EU/dL    Nitrites NEGATIVE  NEG      Leukocyte Esterase TRACE (A) NEG      WBC 0-4 0 - 4 /hpf    RBC 0-5 0 - 5 /hpf    Epithelial cells FEW FEW /lpf    Bacteria NEGATIVE  NEG /hpf    UA:UC IF INDICATED CULTURE NOT INDICATED BY UA RESULT CNI      Hyaline cast 0-2 0 - 5 /lpf   POC LACTIC ACID    Collection Time: 07/03/19  4:40 PM   Result Value Ref Range    Lactic Acid (POC) 1.13 0.40 - 2.00 mmol/L   HCG URINE, QL. - POC    Collection Time: 07/03/19  6:39 PM   Result Value Ref Range    Pregnancy test,urine (POC) NEGATIVE  NEG         Radiologic Studies -   CT ABD PELV WO CONT   Final Result   IMPRESSION:    A left adnexal cyst measures 5.7 cm. No other acute abnormality in the abdomen   or pelvis. No  calculus or hydronephrosis. Normal appendix. CT Results  (Last 48 hours)               07/03/19 1929  CT ABD PELV WO CONT Final result    Impression:  IMPRESSION:    A left adnexal cyst measures 5.7 cm. No other acute abnormality in the abdomen   or pelvis. No  calculus or hydronephrosis. Normal appendix. Narrative:  EXAM:  CT abdomen pelvis without contrast       INDICATION: Right lower quadrant and right flank pain. Vomiting. COMPARISON: CT chest 11/16/2018. TECHNIQUE: Helical CT of the abdomen  and pelvis  without contrast. Coronal and   sagittal reformats are performed. CT dose reduction was achieved through use of   a standardized protocol tailored for this examination and automatic exposure   control for dose modulation. FINDINGS:    Solid organ evaluation is limited without contrast.        The visualized lung bases demonstrate no mass or consolidation. The heart size   is normal. There is no pericardial or pleural effusion. There is no renal, ureteral, or bladder calculus. The kidneys are symmetric   without hydronephrosis. There is no perinephric fluid or fat stranding. The liver, spleen, pancreas, and adrenal glands are normal.  The gall bladder is   surgically absent without intra- or extra-hepatic biliary dilatation. There are no dilated bowel loops. The appendix is normal. There are a few   scattered colonic diverticula without focal adjacent inflammation. There are no enlarged lymph nodes. There is no free fluid or free air. The urinary bladder is unremarkable. A left adnexal cyst measures 5.7 cm. The bony structures are age-appropriate. CXR Results  (Last 48 hours)    None          Medical Decision Making   I am the first provider for this patient. I reviewed the vital signs, available nursing notes, past medical history, past surgical history, family history and social history. Vital Signs-Reviewed the patient's vital signs.   Patient Vitals for the past 24 hrs:   Temp Pulse Resp BP SpO2   07/03/19 2017 98.4 °F (36.9 °C) 71 18 117/72 99 %   07/03/19 2015 -- 65 19 -- 97 %   07/03/19 2014 -- 65 18 -- 96 %   07/03/19 1945 -- 64 21 133/82 95 %   07/03/19 1912 -- 73 22 -- 95 %   07/03/19 1911 -- 73 21 -- 95 %   07/03/19 1901 -- 73 20 -- 97 %   07/03/19 1900 -- 73 19 97/76 95 %   07/03/19 1830 -- 81 17 140/82 95 %   07/03/19 1809 -- 78 17 -- 95 %   07/03/19 1800 -- 83 18 120/79 98 %   07/03/19 1758 -- -- -- 127/80 --   07/03/19 1745 98.2 °F (36.8 °C) 92 24 (!) 136/99 95 %   07/03/19 1702 -- -- -- -- 100 %   07/03/19 1701 -- 86 16 -- 96 %   07/03/19 1655 -- 94 18 -- 94 %   07/03/19 1652 -- 93 24 -- --   07/03/19 1620 98.1 °F (36.7 °C) 92 24 (!) 131/111 95 %       Pulse Oximetry Analysis - 95% on RA    Cardiac Monitor:   Rate: 92 bpm  Rhythm: Normal Sinus Rhythm      Records Reviewed: Nursing Notes, Old Medical Records, Previous electrocardiograms, Previous Radiology Studies and Previous Laboratory Studies    Provider Notes (Medical Decision Making):   Pt presents with acute abdominal pain; vital signs stable with currently a non-peritoneal exam; DDx includes: Gastroenteritis, hepatitis, pancreatitis, obstruction, appendicitis, viral illness, IBD, diverticulitis, mesenteric ischemia, AAA or descending dissection, ACS, kidney stone. Will get labs, treat symptomatically and obtain serial abdominal exams to determine if additional imaging is indicated. Will reassess and monitor closely. ED Course:   Initial assessment performed. The patients presenting problems have been discussed, and they are in agreement with the care plan formulated and outlined with them. I have encouraged them to ask questions as they arise throughout their visit. TOBACCO COUNSELING:   Upon evaluation, pt expressed that they are a current tobacco user. For approximately 10 minutes, pt has been counseled on the dangers of smoking and was encouraged to quit as soon as possible in order to decrease further risks to their health. Pt has conveyed their understanding of the risks involved should they continue to use tobacco products. ALCOHOL/SUBSTANCE ABUSE COUNSELING:  Upon evaluation, pt endorsed recent alcohol/illicit drug use. For approximately 15 minutes, pt has been counseled on the dangers of alcohol and illicit drug use on their health, and they were encouraged to quit as soon as possible in order to decrease further risks to their health. Pt has conveyed their understanding of the risks involved should they continue to use these products. HYPERTENSION COUNSELING   Education was provided to the patient today regarding their hypertension. Patient is made aware of their elevated blood pressure and is instructed to follow up this week with their Primary Care for a recheck. Patient is counseled regarding consequences of chronic, uncontrolled hypertension including kidney disease, heart disease, stroke or even death. Patient states their understanding and agrees to follow up this week.  Additionally, during their visit, I discussed sodium restriction, maintaining ideal body weight and regular exercise program as physiologic means to achieve blood pressure control. The patient will strive towards this. I reviewed our electronic medical record system for any past medical records that were available that may contribute to the patient's current condition, the nursing notes and vital signs from today's visit. Robert Mckeon MD    Medications Administered During ED Course:  Medications   ketorolac (TORADOL) injection 30 mg (30 mg IntraVENous Given 7/3/19 1656)   ondansetron (ZOFRAN) injection 4 mg (4 mg IntraVENous Given 7/3/19 1657)   sodium chloride 0.9 % bolus infusion 1,000 mL (0 mL IntraVENous IV Completed 7/3/19 1827)   fentaNYL citrate (PF) injection 100 mcg (100 mcg IntraVENous Given 7/3/19 1749)   diazePAM (VALIUM) tablet 5 mg (5 mg Oral Given 7/3/19 2025)   oxyCODONE-acetaminophen (PERCOCET) 5-325 mg per tablet 1 Tab (1 Tab Oral Given 7/3/19 2025)     Progress Note  I have re-examined the patient. she feels much better and symptoms improved. Tolerating oral intake. Abdomen is soft and without guarding, rebound or other peritoneal signs. I have discussed with patient the importance of close f/u and to return to the ED if symptoms don't improve or worsen. Progress Note:  Patient has been reassessed and reports feeling better and symptoms have improved after ED treatment. Kalina Washington is able to tolerate PO and ambulate per baseline. Fazal Samano's final labs and imaging have been reviewed with her. She has been counseled regarding her diagnosis. She verbally conveys understanding and agreement of the signs, symptoms, diagnosis, treatment and prognosis and additionally agrees to follow up as recommended with Dr. Pedrito Moreno NP in 24 - 48 hours. She also agrees with the care-plan and conveys that all of her questions have been answered.   I have also put together some discharge instructions for her that include: 1) educational information regarding their diagnosis, 2) how to care for their diagnosis at home, as well a 3) list of reasons why they would want to return to the ED prior to their follow-up appointment, should their condition change. I have answered all questions to the patient's satisfaction. Strict return precautions given. She both understood and agreed with plan as discussed above. Vital signs stable for discharge. Disposition: DISCHARGE     The pt is ready for discharge. The pt's signs, symptoms, diagnosis, and discharge instructions have been discussed and pt has conveyed their understanding. The pt is to follow up as recommended or return to ER should their symptoms worsen. Plan has been discussed and pt is in agreement. PLAN:  1. Discharge Medication List as of 7/3/2019  8:17 PM      START taking these medications    Details   naproxen (NAPROSYN) 500 mg tablet Take 1 Tab by mouth two (2) times daily (with meals). , Print, Disp-20 Tab, R-0      dicyclomine (BENTYL) 20 mg tablet Take 1 Tab by mouth every six (6) hours for 20 doses. , Print, Disp-20 Tab, R-0      ondansetron (ZOFRAN ODT) 4 mg disintegrating tablet Take 1 Tab by mouth every eight (8) hours as needed for Nausea. , Print, Disp-20 Tab, R-0         CONTINUE these medications which have NOT CHANGED    Details   budesonide-formoterol (SYMBICORT) 160-4.5 mcg/actuation HFAA Take 2 Puffs by inhalation two (2) times a day., Normal, Disp-1 Inhaler, R-4      hydroCHLOROthiazide (HYDRODIURIL) 25 mg tablet Take 1 Tab by mouth daily. , Normal, Disp-90 Tab, R-3      candesartan (ATACAND) 32 mg tablet Take 1 Tab by mouth daily. , Normal, Disp-30 Tab, R-0      ALPRAZolam (XANAX) 0.5 mg tablet Take 1 Tab by mouth daily as needed for Anxiety. Si tab daily prn, Print, Disp-30 Tab, R-2           2.    Follow-up Information     Follow up With Specialties Details Why Contact Info    Arnel Valente NP Nurse Practitioner   [address]  [address] [address]  [phone]      [facility] EMERGENCY DEPT Emergency Medicine  As needed, If symptoms worsen [address] [place] [address]  [phone]          Return to ED if worse  Diagnosis     Clinical Impression:   1. Acute abdominal pain    2. Adnexal cyst    3. Nausea without vomiting    4. Acute hypokalemia    5. Tobacco abuse        Attestation:  I personally performed the services described in this documentation on this date 7/3/2019 for patient, Pasquale Ochoa. Harley Lopez MD    Please note that this dictation was completed with "[employer] the computer voice recognition software. Quite often unanticipated grammatical, syntax, homophones, and other interpretive errors are inadvertently transcribed by the computer software. Please disregard these errors. Please excuse any errors that have escaped final proofreading. This note will not be viewable in [address]

## 2019-07-07 LAB
BACTERIA SPEC CULT: ABNORMAL
SERVICE CMNT-IMP: ABNORMAL

## 2019-07-23 ENCOUNTER — HOSPITAL ENCOUNTER (OUTPATIENT)
Dept: MAMMOGRAPHY | Age: 49
Discharge: HOME | End: 2019-07-23
Attending: NURSE PRACTITIONER
Payer: COMMERCIAL

## 2019-07-23 ENCOUNTER — HOSPITAL ENCOUNTER (OUTPATIENT)
Dept: PULMONOLOGY | Age: 49
Discharge: HOME | End: 2019-07-23
Attending: NURSE PRACTITIONER
Payer: COMMERCIAL

## 2019-07-23 DIAGNOSIS — J44.9 CHRONIC OBSTRUCTIVE PULMONARY DISEASE, UNSPECIFIED COPD TYPE (HCC): ICD-10-CM

## 2019-07-23 DIAGNOSIS — Z12.31 SCREENING MAMMOGRAM, ENCOUNTER FOR: ICD-10-CM

## 2019-07-23 PROCEDURE — 94375 RESPIRATORY FLOW VOLUME LOOP: CPT

## 2019-07-23 PROCEDURE — 94726 PLETHYSMOGRAPHY LUNG VOLUMES: CPT

## 2019-07-23 PROCEDURE — 77067 SCR MAMMO BI INCL CAD: CPT

## 2019-07-23 PROCEDURE — 94729 DIFFUSING CAPACITY: CPT

## 2019-07-24 DIAGNOSIS — I10 BENIGN ESSENTIAL HTN: ICD-10-CM

## 2019-07-24 NOTE — TELEPHONE ENCOUNTER
----- Message from Gomez Siegel sent at 7/24/2019  8:41 AM EDT -----  Regarding: LINA Valente / Telephone  Pt wants to make pcp aware that no refills were put in for her \"candesartan 32mg\".  Pt best contact is [phone]

## 2019-07-25 RX ORDER — CANDESARTAN 32 MG/1
32 TABLET ORAL DAILY
Qty: 90 TAB | Refills: 1 | Status: SHIPPED | OUTPATIENT
Start: 2019-07-25 | End: 2019-09-06 | Stop reason: ALTCHOICE

## 2019-07-27 NOTE — PROCEDURES
[facility]  PULMONARY FUNCTION TEST    Name:  Kan Griffin  MR#:  [id number]  :  1970  ACCOUNT #:  [de-identified]  DATE OF SERVICE:  2019    REASON FOR THE TEST:  Dyspnea with exertion. Spirometry and lung volumes were performed and they reveal:  1. Mild airflow obstruction. 2.  No restrictive lung disease. 3.  Moderate reduction of DLCO. 4.  Normal flow-volume loop. Lisa Bobby MD      [id number]  D:  2019 14:36  T:  2019 15:46  JOB #:  [id number]  CC:   Karen French NP

## 2019-08-20 ENCOUNTER — OFFICE VISIT (OUTPATIENT)
Dept: FAMILY MEDICINE CLINIC | Age: 49
End: 2019-08-20

## 2019-08-20 VITALS
TEMPERATURE: 97.4 F | HEIGHT: 67 IN | DIASTOLIC BLOOD PRESSURE: 68 MMHG | SYSTOLIC BLOOD PRESSURE: 117 MMHG | BODY MASS INDEX: 29.91 KG/M2 | WEIGHT: 190.6 LBS | HEART RATE: 79 BPM | OXYGEN SATURATION: 97 % | RESPIRATION RATE: 16 BRPM

## 2019-08-20 DIAGNOSIS — G56.01 RIGHT CARPAL TUNNEL SYNDROME: Primary | ICD-10-CM

## 2019-08-20 RX ORDER — DICLOFENAC SODIUM 50 MG/1
50 TABLET, DELAYED RELEASE ORAL 2 TIMES DAILY
Qty: 10 TAB | Refills: 0 | Status: SHIPPED | OUTPATIENT
Start: 2019-08-20 | End: 2019-08-25

## 2019-08-20 NOTE — PATIENT INSTRUCTIONS
Carpal Tunnel Syndrome: Exercises  Introduction  Here are some examples of exercises for you to try. The exercises may be suggested for a condition or for rehabilitation. Start each exercise slowly. Ease off the exercises if you start to have pain. You will be told when to start these exercises and which ones will work best for you. Warm-up stretches  When you no longer have pain or numbness, you can do exercises to help prevent carpal tunnel syndrome from coming back. Do not do any stretch or movement that is uncomfortable or painful. 1. Rotate your wrist up, down, and from side to side. Repeat 4 times. 2. Stretch your fingers far apart. Relax them, and then stretch them again. Repeat 4 times. 3. Stretch your thumb by pulling it back gently, holding it, and then releasing it. Repeat 4 times. How to do the exercises  Prayer stretch    1. Start with your palms together in front of your chest just below your chin. 2. Slowly lower your hands toward your waistline, keeping your hands close to your stomach and your palms together until you feel a mild to moderate stretch under your forearms. 3. Hold for at least 15 to 30 seconds. Repeat 2 to 4 times. Wrist flexor stretch    1. Extend your arm in front of you with your palm up. 2. Bend your wrist, pointing your hand toward the floor. 3. With your other hand, gently bend your wrist farther until you feel a mild to moderate stretch in your forearm. 4. Hold for at least 15 to 30 seconds. Repeat 2 to 4 times. Wrist extensor stretch    1. Repeat steps 1 through 4 of the stretch above, but begin with your extended hand palm down. Follow-up care is a key part of your treatment and safety. Be sure to make and go to all appointments, and call your doctor if you are having problems. It's also a good idea to know your test results and keep a list of the medicines you take. Where can you learn more?   Go to http://ian-norbert.info/. Enter X051 in the search box to learn more about \"Carpal Tunnel Syndrome: Exercises. \"  Current as of: September 20, 2018  Content Version: 12.1  © 2384-4279 [employer]. Care instructions adapted under license by [employer] (which disclaims liability or warranty for this information). If you have questions about a medical condition or this instruction, always ask your healthcare professional. [place] [address] any warranty or liability for your use of this information.

## 2019-08-20 NOTE — PROGRESS NOTES
Chief Complaint   Patient presents with    Hand Problem     fingers of right hand have been feeling numb/tingling x 1 month      1. Have you been to the ER, urgent care clinic since your last visit? Hospitalized since your last visit? No    2. Have you seen or consulted any other health care providers outside of the [address] [place] [address] since your last visit? Include any pap smears or colon screening.  No

## 2019-08-20 NOTE — PROGRESS NOTES
Assessment/Plan:     Diagnoses and all orders for this visit:    1. Right carpal tunnel syndrome  -     diclofenac EC (VOLTAREN) 50 mg EC tablet; Take 1 Tab by mouth two (2) times a day for 5 days.  -     REFERRAL TO ORTHOPEDICS    NSAIDs as above. See Dr. Manuel Cohn if no improvement. Given home exercises. We have discussed carpal tunnel bracing as well. Follow-up and Dispositions    · Return if symptoms worsen or fail to improve. Discussed expected course/resolution/complications of diagnosis in detail with patient. Medication risks/benefits/costs/interactions/alternatives discussed with patient. Pt was given after visit summary which includes diagnoses, current medications & vitals. Pt expressed understanding with the diagnosis and plan          Subjective:      Anne Ptael is a 54 y.o. female who presents for had concerns including Hand Problem (fingers of right hand have been feeling numb/tingling x 1 month ). Reports a one month history of right hand numbness and tingling. Symptoms were gradual.  Symptoms are worsening over time. Reports symptoms are worse at night as well. Has not attempted bracing. Is not currently taking OTC treatment. This is her first evaluation. Patient Active Problem List   Diagnosis Code    Anemia D64.9    Anxiety F41.9    COPD (chronic obstructive pulmonary disease) ([facility]) J44.9    Essential hypertension I10    SVT (supraventricular tachycardia) ([facility]) I47.1    Tobacco abuse Z72.0       Current Outpatient Medications   Medication Sig Dispense Refill    diclofenac EC (VOLTAREN) 50 mg EC tablet Take 1 Tab by mouth two (2) times a day for 5 days. 10 Tab 0    candesartan (ATACAND) 32 mg tablet Take 1 Tab by mouth daily. 90 Tab 1    budesonide-formoterol (SYMBICORT) 160-4.5 mcg/actuation HFAA Take 2 Puffs by inhalation two (2) times a day. 1 Inhaler 4    hydroCHLOROthiazide (HYDRODIURIL) 25 mg tablet Take 1 Tab by mouth daily.  90 Tab 3    ALPRAZolam (XANAX) 0.5 mg tablet Take 1 Tab by mouth daily as needed for Anxiety. Si tab daily prn 30 Tab 2    ondansetron (ZOFRAN ODT) 4 mg disintegrating tablet Take 1 Tab by mouth every eight (8) hours as needed for Nausea. 20 Tab 0       Allergies   Allergen Reactions    Hydralazine Unknown (comments)     jitteriness    Hydromorphone Itching     morphine    Morphine Itching    Sulfa (Sulfonamide Antibiotics) Nausea and Vomiting       ROS:   Review of Systems   Constitutional: Negative for malaise/fatigue. Eyes: Negative for blurred vision. Respiratory: Negative for shortness of breath. Cardiovascular: Negative for chest pain. Neurological: Positive for sensory change. Objective:     Visit Vitals  /68 (BP 1 Location: Left arm, BP Patient Position: Sitting)   Pulse 79   Temp 97.4 °F (36.3 °C) (Oral)   Resp 16   Ht 5' 7\" (1.702 m)   Wt 190 lb 9.6 oz (86.5 kg)   SpO2 97%   BMI 29.85 kg/m²       Vitals and Nurse Documentation reviewed. Physical Exam   Constitutional: No distress. Cardiovascular: S1 normal and S2 normal. Exam reveals no gallop and no friction rub. No murmur heard. Pulmonary/Chest: Breath sounds normal. No respiratory distress. Musculoskeletal:        Right hand: She exhibits swelling. She exhibits normal range of motion. Positive Phalen on the right. Skin: Skin is warm and dry.    Psychiatric: Mood and affect normal.

## 2019-09-06 ENCOUNTER — OFFICE VISIT (OUTPATIENT)
Dept: FAMILY MEDICINE CLINIC | Age: 49
End: 2019-09-06

## 2019-09-06 VITALS
HEART RATE: 62 BPM | RESPIRATION RATE: 17 BRPM | HEIGHT: 67 IN | WEIGHT: 192 LBS | TEMPERATURE: 97.4 F | OXYGEN SATURATION: 95 % | BODY MASS INDEX: 30.13 KG/M2 | SYSTOLIC BLOOD PRESSURE: 107 MMHG | DIASTOLIC BLOOD PRESSURE: 58 MMHG

## 2019-09-06 DIAGNOSIS — G47.00 INSOMNIA, UNSPECIFIED TYPE: ICD-10-CM

## 2019-09-06 DIAGNOSIS — F14.11 COCAINE ABUSE IN REMISSION (HCC): ICD-10-CM

## 2019-09-06 DIAGNOSIS — I10 BENIGN ESSENTIAL HTN: Primary | ICD-10-CM

## 2019-09-06 RX ORDER — CANDESARTAN CILEXETIL AND HYDROCHLOROTHIAZIDE 16; 12.5 MG/1; MG/1
1 TABLET ORAL DAILY
Qty: 30 TAB | Refills: 3 | Status: SHIPPED | OUTPATIENT
Start: 2019-09-06 | End: 2019-10-29 | Stop reason: SINTOL

## 2019-09-06 RX ORDER — TRAZODONE HYDROCHLORIDE 50 MG/1
50 TABLET ORAL
Qty: 30 TAB | Refills: 3 | Status: SHIPPED | OUTPATIENT
Start: 2019-09-06 | End: 2019-10-29

## 2019-09-06 RX ORDER — HYDROCHLOROTHIAZIDE 25 MG/1
25 TABLET ORAL DAILY
Qty: 90 TAB | Refills: 3 | Status: CANCELLED | OUTPATIENT
Start: 2019-09-06

## 2019-09-06 RX ORDER — CANDESARTAN 32 MG/1
32 TABLET ORAL DAILY
Qty: 90 TAB | Refills: 1 | Status: CANCELLED | OUTPATIENT
Start: 2019-09-06

## 2019-09-06 NOTE — PATIENT INSTRUCTIONS

## 2019-09-06 NOTE — PROGRESS NOTES
Chief Complaint   Patient presents with    Complete Physical    Numbness     hands     1. Have you been to the ER, urgent care clinic since your last visit? Hospitalized since your last visit? No    2. Have you seen or consulted any other health care providers outside of the [address] [place] [address] since your last visit? Include any pap smears or colon screening.  No

## 2019-09-06 NOTE — PROGRESS NOTES
Assessment/Plan:     Diagnoses and all orders for this visit:    1. Benign essential HTN  -     candesartan-hydroCHLOROthiazide (ATACAND HCT) 16-12.5 mg per tablet; Take 1 Tab by mouth daily. Hypotensive. Decrease to above. Will also change to combo pill. Continue to encourage weight loss through dietary change. 2. Insomnia, unspecified type  -     traZODone (DESYREL) 50 mg tablet; Take 1 Tab by mouth nightly. Will avoid high risk agents as discussed. Discontinue Xanax and start Trazodone as above. Follow up if no improvement. Consider rule out of Sleep Apnea as well. 3. Cocaine abuse in remission [facility])  Continue with rehabilitation. Follow-up and Dispositions    · Return in about 3 months (around 12/6/2019) for Complete Physical.         Discussed expected course/resolution/complications of diagnosis in detail with patient. Medication risks/benefits/costs/interactions/alternatives discussed with patient. Pt was given after visit summary which includes diagnoses, current medications & vitals. Pt expressed understanding with the diagnosis and plan          Subjective:      Elroy Laura is a 54 y.o. female who presents for had concerns including hypertension follow up. Hypertension  Patient is here for follow-up of hypertension. She indicates that she is feeling well and denies any symptoms referable to her hypertension, including chest pain, palpitations, dyspnea, orthopnea, or peripheral edema. Patient has these side effects of medication: lightheadedness. She is not exercising and is not adherent to low salt diet. Blood pressure is well controlled at home. Use of agents associated with hypertension: none. History of renal disease: yes. Cardiovascular risk factors: smoking/ tobacco exposure, obesity, sedentary life style, hypertension, stress, post-menopausal, cocaine abuse. She is currently several months sober from cocaine use.   She is actively involved in rehabilitation on a daily basis. She is caring for her grandson occasionally who is here with her today. Insomnia  Fer Samson is a 54 y.o. female presents with difficulty sleeping. ONSET: problem is longstanding  SEEN PREVIOUSLY: this is a diagnosis of longstanding. DESCRIPTION OF SX:  difficulty initiating sleep.  frequent awakening  ASSOCIATED ISSUES: chronic generalized anxiety  DENIES: ETOH overuse  TREATMENT TO DATE: Xanax and would like refill today          Patient Active Problem List   Diagnosis Code    Anemia D64.9    Anxiety F41.9    COPD (chronic obstructive pulmonary disease) ([facility]) J44.9    Essential hypertension I10    SVT (supraventricular tachycardia) ([facility]) I47.1    Tobacco abuse Z72.0    Cocaine abuse in remission ([facility] [address] F14.11       Current Outpatient Medications   Medication Sig Dispense Refill    candesartan-hydroCHLOROthiazide (ATACAND HCT) 16-12.5 mg per tablet Take 1 Tab by mouth daily. 30 Tab 3    traZODone (DESYREL) 50 mg tablet Take 1 Tab by mouth nightly. 30 Tab 3    budesonide-formoterol (SYMBICORT) 160-4.5 mcg/actuation HFAA Take 2 Puffs by inhalation two (2) times a day. 1 Inhaler 4    ondansetron (ZOFRAN ODT) 4 mg disintegrating tablet Take 1 Tab by mouth every eight (8) hours as needed for Nausea. 20 Tab 0       Allergies   Allergen Reactions    Hydralazine Unknown (comments)     jitteriness    Hydromorphone Itching     morphine    Morphine Itching    Sulfa (Sulfonamide Antibiotics) Nausea and Vomiting       ROS:   Review of Systems   Constitutional: Negative for malaise/fatigue. Eyes: Negative for blurred vision. Respiratory: Negative for shortness of breath. Cardiovascular: Negative for chest pain. Psychiatric/Behavioral: The patient is nervous/anxious and has insomnia.         Objective:     Visit Vitals  /58   Pulse 62   Temp 97.4 °F (36.3 °C) (Oral)   Resp 17   Ht 5' 7\" (1.702 m)   Wt 192 lb (87.1 kg)   SpO2 95%   BMI 30.07 kg/m² Vitals and Nurse Documentation reviewed. Physical Exam   Constitutional: No distress. Cardiovascular: S1 normal and S2 normal. Exam reveals no gallop and no friction rub. No murmur heard. Pulmonary/Chest: Breath sounds normal. No respiratory distress. Skin: Skin is warm and dry.    Psychiatric: Mood and affect normal.

## 2019-09-11 ENCOUNTER — TELEPHONE (OUTPATIENT)
Dept: FAMILY MEDICINE CLINIC | Age: 49
End: 2019-09-11

## 2019-09-11 DIAGNOSIS — G47.00 INSOMNIA, UNSPECIFIED TYPE: Primary | ICD-10-CM

## 2019-09-11 NOTE — TELEPHONE ENCOUNTER
Pt is calling regaurding the following Rx: traZODone (DESYREL) 50 mg tablet. It is giving her side effects like sleepless night and irritable and anxious. Pt stated that when she was taking Xanax she was able to sleep through the night and was not anxious.        Best contact # [phone]    LOV 09/06/19

## 2019-09-11 NOTE — TELEPHONE ENCOUNTER
Returned call to patient. Patient informed nurse that she has bee ntaking trazodone for a little over 1 week, has not been able to sleep at night feels very anxious and irritable during the day. Reports never feeling like that before. Had some xanax left over from last month, took one tab last night and was able to sleep. Patient would like to know if she can take xanax instead.

## 2019-09-12 RX ORDER — HYDROXYZINE 25 MG/1
25 TABLET, FILM COATED ORAL
Qty: 10 TAB | Refills: 0 | Status: SHIPPED | OUTPATIENT
Start: 2019-09-12 | End: 2019-10-29

## 2019-09-12 NOTE — TELEPHONE ENCOUNTER
Returned call to patient, NENA verified. Informed patient of pcp's recommendations. Patient voiced understanding and stated that she will  medication today and will call back in a week with an update.

## 2019-09-12 NOTE — TELEPHONE ENCOUNTER
We have discussed trying some alternatives and the avoidance of daily benzodiazepines which are not recommended for insomnia treatment. I will send an alternative called Atarax that is not habit forming and will we attempt for one week and she can call with update.

## 2019-10-29 ENCOUNTER — OFFICE VISIT (OUTPATIENT)
Dept: FAMILY MEDICINE CLINIC | Age: 49
End: 2019-10-29

## 2019-10-29 VITALS
SYSTOLIC BLOOD PRESSURE: 93 MMHG | OXYGEN SATURATION: 96 % | BODY MASS INDEX: 30.1 KG/M2 | WEIGHT: 191.8 LBS | HEART RATE: 90 BPM | DIASTOLIC BLOOD PRESSURE: 57 MMHG | RESPIRATION RATE: 22 BRPM | HEIGHT: 67 IN | TEMPERATURE: 97.8 F

## 2019-10-29 DIAGNOSIS — J40 BRONCHITIS: Primary | ICD-10-CM

## 2019-10-29 DIAGNOSIS — I10 ESSENTIAL HYPERTENSION: ICD-10-CM

## 2019-10-29 DIAGNOSIS — F14.11 COCAINE ABUSE IN REMISSION (HCC): ICD-10-CM

## 2019-10-29 RX ORDER — PREDNISONE 20 MG/1
20 TABLET ORAL
Qty: 5 TAB | Refills: 0 | Status: SHIPPED | OUTPATIENT
Start: 2019-10-29 | End: 2019-11-03

## 2019-10-29 RX ORDER — HYDROCHLOROTHIAZIDE 25 MG/1
25 TABLET ORAL DAILY
Qty: 30 TAB | Refills: 1 | Status: SHIPPED | OUTPATIENT
Start: 2019-10-29 | End: 2020-02-28 | Stop reason: SDUPTHER

## 2019-10-29 NOTE — PROGRESS NOTES
Chief Complaint   Patient presents with    Cold Symptoms     cough , chest congestion     Chills     1. Have you been to the ER, urgent care clinic since your last visit? Hospitalized since your last visit? No    2. Have you seen or consulted any other health care providers outside of the [address] [place] [address] since your last visit? Include any pap smears or colon screening.  No

## 2019-10-29 NOTE — PATIENT INSTRUCTIONS
High Blood Pressure: Care Instructions  Overview    It's normal for blood pressure to go up and down throughout the day. But if it stays up, you have high blood pressure. Another name for high blood pressure is hypertension. Despite what a lot of people think, high blood pressure usually doesn't cause headaches or make you feel dizzy or lightheaded. It usually has no symptoms. But it does increase your risk of stroke, heart attack, and other problems. You and your doctor will talk about your risks of these problems based on your blood pressure. Your doctor will give you a goal for your blood pressure. Your goal will be based on your health and your age. Lifestyle changes, such as eating healthy and being active, are always important to help lower blood pressure. You might also take medicine to reach your blood pressure goal.  Follow-up care is a key part of your treatment and safety. Be sure to make and go to all appointments, and call your doctor if you are having problems. It's also a good idea to know your test results and keep a list of the medicines you take. How can you care for yourself at home? Medical treatment  · If you stop taking your medicine, your blood pressure will go back up. You may take one or more types of medicine to lower your blood pressure. Be safe with medicines. Take your medicine exactly as prescribed. Call your doctor if you think you are having a problem with your medicine. · Talk to your doctor before you start taking aspirin every day. Aspirin can help certain people lower their risk of a heart attack or stroke. But taking aspirin isn't right for everyone, because it can cause serious bleeding. · See your doctor regularly. You may need to see the doctor more often at first or until your blood pressure comes down. · If you are taking blood pressure medicine, talk to your doctor before you take decongestants or anti-inflammatory medicine, such as ibuprofen.  Some of these medicines can raise blood pressure. · Learn how to check your blood pressure at home. Lifestyle changes  · Stay at a healthy weight. This is especially important if you put on weight around the waist. Losing even 10 pounds can help you lower your blood pressure. · If your doctor recommends it, get more exercise. Walking is a good choice. Bit by bit, increase the amount you walk every day. Try for at least 30 minutes on most days of the week. You also may want to swim, bike, or do other activities. · Avoid or limit alcohol. Talk to your doctor about whether you can drink any alcohol. · Try to limit how much sodium you eat to less than 2,300 milligrams (mg) a day. Your doctor may ask you to try to eat less than 1,500 mg a day. · Eat plenty of fruits (such as bananas and oranges), vegetables, legumes, whole grains, and low-fat dairy products. · Lower the amount of saturated fat in your diet. Saturated fat is found in animal products such as milk, cheese, and meat. Limiting these foods may help you lose weight and also lower your risk for heart disease. · Do not smoke. Smoking increases your risk for heart attack and stroke. If you need help quitting, talk to your doctor about stop-smoking programs and medicines. These can increase your chances of quitting for good. When should you call for help? Call  911 anytime you think you may need emergency care. This may mean having symptoms that suggest that your blood pressure is causing a serious heart or blood vessel problem. Your blood pressure may be over 180/120.   For example, call  911 if:    · You have symptoms of a heart attack. These may include:  ? Chest pain or pressure, or a strange feeling in the chest.  ? Sweating. ? Shortness of breath. ? Nausea or vomiting. ? Pain, pressure, or a strange feeling in the back, neck, jaw, or upper belly or in one or both shoulders or arms. ? Lightheadedness or sudden weakness.   ? A fast or irregular heartbeat.     · You have symptoms of a stroke. These may include:  ? Sudden numbness, tingling, weakness, or loss of movement in your face, arm, or leg, especially on only one side of your body. ? Sudden vision changes. ? Sudden trouble speaking. ? Sudden confusion or trouble understanding simple statements. ? Sudden problems with walking or balance. ? A sudden, severe headache that is different from past headaches.     · You have severe back or belly pain.    Do not wait until your blood pressure comes down on its own. Get help right away.   Call your doctor now or seek immediate care if:    · Your blood pressure is much higher than normal (such as 180/120 or higher), but you don't have symptoms.     · You think high blood pressure is causing symptoms, such as:  ? Severe headache.  ? Blurry vision.    Watch closely for changes in your health, and be sure to contact your doctor if:    · Your blood pressure measures higher than your doctor recommends at least 2 times. That means the top number is higher or the bottom number is higher, or both.     · You think you may be having side effects from your blood pressure medicine. Where can you learn more? Go to http://ian-norbert.info/. Enter A157 in the search box to learn more about \"High Blood Pressure: Care Instructions. \"  Current as of: April 9, 2019  Content Version: 12.2  © 4960-0628 [employer]. Care instructions adapted under license by [employer] (which disclaims liability or warranty for this information). If you have questions about a medical condition or this instruction, always ask your healthcare professional. [place] [address] any warranty or liability for your use of this information.     [facility] [address] [place] Anne [address] [facility], [address] [place] [address]  [phone]

## 2019-10-29 NOTE — PROGRESS NOTES
Assessment/Plan:     Diagnoses and all orders for this visit:    1. Bronchitis  -     predniSONE (DELTASONE) 20 mg tablet; Take 20 mg by mouth daily (with breakfast) for 5 days. Treatment as above. Continue Albuterol and Symbicort. Continue to encourage smoking cessation. 2. Essential hypertension  -     hydroCHLOROthiazide (HYDRODIURIL) 25 mg tablet; Take 1 Tab by mouth daily. Low blood pressure. Discontinue candesartan. Continue HCTZ. 3. Cocaine abuse in remission [facility])  Referred to psychiatry for treatment and counseling. Follow-up and Dispositions    · Return in about 3 months (around 1/29/2020). Discussed expected course/resolution/complications of diagnosis in detail with patient. Medication risks/benefits/costs/interactions/alternatives discussed with patient. Pt was given after visit summary which includes diagnoses, current medications & vitals. Pt expressed understanding with the diagnosis and plan          Subjective:      Khushboo Darling is a 54 y.o. female who presents for had concerns including Cold Symptoms (cough , chest congestion ) and Chills. Upper Respiratory Infection  Patient complains of symptoms of a URI. Symptoms include sore throat and cough. Onset of symptoms was 2 days ago, unchanged since that time. She also c/o no  fever for the past 2 days . She is drinking plenty of fluids. . Evaluation to date: none. Treatment to date: OTC products, which have been somewhat effective. She continues to be drug free. She is currently involved in AA/NA several times weekly. She reports worsening anxiety and depression. She reports vertigo with standing. She is concerned about low blood pressure.         Patient Active Problem List   Diagnosis Code    Anemia D64.9    Anxiety F41.9    COPD (chronic obstructive pulmonary disease) ([facility] [address] J44.9    Essential hypertension I10    SVT (supraventricular tachycardia) ([facility]) I47.1    Tobacco abuse Z72.0    Cocaine abuse in remission ([facility]) F14.11       Current Outpatient Medications   Medication Sig Dispense Refill    DM/p-ephed/acetaminoph/doxylam (NYQUIL D PO) Take  by mouth.  hydroCHLOROthiazide (HYDRODIURIL) 25 mg tablet Take 1 Tab by mouth daily. 30 Tab 1    predniSONE (DELTASONE) 20 mg tablet Take 20 mg by mouth daily (with breakfast) for 5 days. 5 Tab 0    budesonide-formoterol (SYMBICORT) 160-4.5 mcg/actuation HFAA Take 2 Puffs by inhalation two (2) times a day. 1 Inhaler 4       Allergies   Allergen Reactions    Hydralazine Unknown (comments)     jitteriness    Hydromorphone Itching     morphine    Morphine Itching    Sulfa (Sulfonamide Antibiotics) Nausea and Vomiting       ROS:   Review of Systems   Constitutional: Negative for chills, fever and malaise/fatigue. HENT: Positive for sore throat. Negative for congestion, ear pain and sinus pain. Respiratory: Positive for cough. Negative for sputum production, shortness of breath and wheezing. Cardiovascular: Negative for chest pain. Neurological: Negative for seizures. Endo/Heme/Allergies: Negative for environmental allergies. Objective:     Visit Vitals  BP 93/57   Pulse 90   Temp 97.8 °F (36.6 °C) (Oral)   Resp 22   Ht 5' 7\" (1.702 m)   Wt 191 lb 12.8 oz (87 kg)   SpO2 96%   BMI 30.04 kg/m²       Vitals and Nurse Documentation reviewed. Physical Exam   Constitutional: No distress. HENT:   Right Ear: Tympanic membrane is not erythematous and not bulging. No middle ear effusion. Left Ear: Tympanic membrane is not erythematous and not bulging. No middle ear effusion. Nose: No rhinorrhea. Right sinus exhibits no maxillary sinus tenderness and no frontal sinus tenderness. Left sinus exhibits no maxillary sinus tenderness and no frontal sinus tenderness. Mouth/Throat: No oropharyngeal exudate or posterior oropharyngeal erythema.    Eyes: EOM and lids are normal.   Cardiovascular: S1 normal and S2 normal. Exam reveals no gallop and no friction rub. No murmur heard. Pulmonary/Chest: She has wheezes. Lymphadenopathy:     She has no cervical adenopathy. Skin: Skin is warm and dry.    Psychiatric: Mood and affect normal.

## 2020-02-28 ENCOUNTER — OFFICE VISIT (OUTPATIENT)
Dept: FAMILY MEDICINE CLINIC | Age: 50
End: 2020-02-28

## 2020-02-28 VITALS
OXYGEN SATURATION: 97 % | HEIGHT: 67 IN | SYSTOLIC BLOOD PRESSURE: 163 MMHG | DIASTOLIC BLOOD PRESSURE: 116 MMHG | HEART RATE: 78 BPM | WEIGHT: 189 LBS | RESPIRATION RATE: 18 BRPM | TEMPERATURE: 97.4 F | BODY MASS INDEX: 29.66 KG/M2

## 2020-02-28 DIAGNOSIS — J44.9 CHRONIC OBSTRUCTIVE PULMONARY DISEASE, UNSPECIFIED COPD TYPE (HCC): ICD-10-CM

## 2020-02-28 DIAGNOSIS — I10 ESSENTIAL HYPERTENSION: Primary | ICD-10-CM

## 2020-02-28 RX ORDER — BUDESONIDE AND FORMOTEROL FUMARATE DIHYDRATE 160; 4.5 UG/1; UG/1
2 AEROSOL RESPIRATORY (INHALATION) 2 TIMES DAILY
Qty: 1 INHALER | Refills: 4 | Status: SHIPPED | OUTPATIENT
Start: 2020-02-28 | End: 2020-10-19

## 2020-02-28 RX ORDER — HYDROCHLOROTHIAZIDE 25 MG/1
25 TABLET ORAL DAILY
Qty: 30 TAB | Refills: 3 | Status: SHIPPED | OUTPATIENT
Start: 2020-02-28 | End: 2020-07-16

## 2020-02-28 NOTE — PROGRESS NOTES
Chief Complaint   Patient presents with    Blood Pressure Check    Medication Refill     1. Have you been to the ER, urgent care clinic since your last visit? Hospitalized since your last visit? No    2. Have you seen or consulted any other health care providers outside of the [address] [place] [address] since your last visit? Include any pap smears or colon screening.  No

## 2020-02-28 NOTE — PROGRESS NOTES
Assessment/Plan:     Diagnoses and all orders for this visit:    1. Essential hypertension  -     hydroCHLOROthiazide (HYDRODIURIL) 25 mg tablet; Take 1 Tab by mouth daily. Uncontrolled. Restart treatment as above. Follow-up in 1 month or sooner as needed. 2. Chronic obstructive pulmonary disease, unspecified COPD type ([facility]  -     budesonide-formoteroL (SYMBICORT) 160-4.5 mcg/actuation HFAA; Take 2 Puffs by inhalation two (2) times a day. Stable. Refilled medications. Continue current therapy. Follow-up and Dispositions    · Return in about 4 weeks (around 3/27/2020) for Follow Up. Discussed expected course/resolution/complications of diagnosis in detail with patient. Medication risks/benefits/costs/interactions/alternatives discussed with patient. Pt was given after visit summary which includes diagnoses, current medications & vitals. Pt expressed understanding with the diagnosis and plan          Subjective:      True [place] is a 54 y.o. female who presents for had concerns including Blood Pressure Check and Medication Refill. Here for elevated blood pressure. She discontinued hydrochlorothiazide of her own volition several months ago. She reports morning headaches. She is otherwise well without chest pain, shortness of breath, blurred vision. She is interested in restarting her medications at this time. Daily tobacco user. Patient Active Problem List   Diagnosis Code    Anemia D64.9    Anxiety F41.9    COPD (chronic obstructive pulmonary disease) ([facility] J44.9    Essential hypertension I10    SVT (supraventricular tachycardia) ([facility]) I47.1    Tobacco abuse Z72.0    Cocaine abuse in remission ([facility]) F14.11       Current Outpatient Medications   Medication Sig Dispense Refill    hydroCHLOROthiazide (HYDRODIURIL) 25 mg tablet Take 1 Tab by mouth daily.  30 Tab 3    budesonide-formoteroL (SYMBICORT) 160-4.5 mcg/actuation HFAA Take 2 Puffs by inhalation two (2) times a day. 1 Inhaler 4    DM/p-ephed/acetaminoph/doxylam (NYQUIL D PO) Take  by mouth. Allergies   Allergen Reactions    Hydralazine Unknown (comments)     jitteriness    Hydromorphone Itching     morphine    Morphine Itching    Sulfa (Sulfonamide Antibiotics) Nausea and Vomiting       ROS:   Review of Systems   Constitutional: Negative for malaise/fatigue. Eyes: Negative for blurred vision. Respiratory: Negative for shortness of breath. Cardiovascular: Negative for chest pain. Objective:     Visit Vitals  BP (!) 163/116   Pulse 78   Temp 97.4 °F (36.3 °C) (Oral)   Resp 18   Ht 5' 7\" (1.702 m)   Wt 189 lb (85.7 kg)   SpO2 97%   BMI 29.60 kg/m²       Vitals and Nurse Documentation reviewed. Physical Exam  Constitutional:       General: She is not in acute distress. Cardiovascular:      Heart sounds: S1 normal and S2 normal. No murmur. No friction rub. No gallop. Pulmonary:      Effort: No respiratory distress. Breath sounds: Normal breath sounds. Skin:     General: Skin is warm and dry.    Psychiatric:         Mood and Affect: Mood and affect normal.

## 2020-02-28 NOTE — LETTER
NOTIFICATION RETURN TO WORK / SCHOOL 
 
2/28/2020 12:28 PM 
 
Ms. Law Guajardo [address] [place] [phone] To Whom It May Concern: 
 
Law Guajardo is currently under the care of [facility]OCHIL[facility] Cortez. She was seen in office today. 02/28/2020 If there are questions or concerns please have the patient contact our office. Sincerely, Flash Bautista NP

## 2020-04-29 ENCOUNTER — APPOINTMENT (OUTPATIENT)
Dept: CT IMAGING | Age: 50
End: 2020-04-29
Attending: PHYSICIAN ASSISTANT
Payer: MEDICAID

## 2020-04-29 ENCOUNTER — APPOINTMENT (OUTPATIENT)
Dept: ULTRASOUND IMAGING | Age: 50
End: 2020-04-29
Attending: PHYSICIAN ASSISTANT
Payer: MEDICAID

## 2020-04-29 ENCOUNTER — HOSPITAL ENCOUNTER (EMERGENCY)
Age: 50
Discharge: HOME | End: 2020-04-29
Attending: EMERGENCY MEDICINE
Payer: MEDICAID

## 2020-04-29 VITALS
OXYGEN SATURATION: 97 % | TEMPERATURE: 98.3 F | DIASTOLIC BLOOD PRESSURE: 104 MMHG | SYSTOLIC BLOOD PRESSURE: 179 MMHG | RESPIRATION RATE: 16 BRPM | HEIGHT: 67 IN | BODY MASS INDEX: 27.47 KG/M2 | HEART RATE: 76 BPM | WEIGHT: 175 LBS

## 2020-04-29 DIAGNOSIS — I10 ESSENTIAL HYPERTENSION: ICD-10-CM

## 2020-04-29 DIAGNOSIS — T14.8XXA INTRAMUSCULAR HEMATOMA: ICD-10-CM

## 2020-04-29 DIAGNOSIS — N83.202 LEFT OVARIAN CYST: Primary | ICD-10-CM

## 2020-04-29 LAB
ALBUMIN SERPL-MCNC: 3.4 G/DL (ref 3.5–5)
ALBUMIN/GLOB SERPL: 0.9 {RATIO} (ref 1.1–2.2)
ALP SERPL-CCNC: 68 U/L (ref 45–117)
ALT SERPL-CCNC: 32 U/L (ref 12–78)
ANION GAP SERPL CALC-SCNC: 6 MMOL/L (ref 5–15)
AST SERPL-CCNC: 20 U/L (ref 15–37)
BASOPHILS # BLD: 0.1 K/UL (ref 0–0.1)
BASOPHILS NFR BLD: 1 % (ref 0–1)
BILIRUB SERPL-MCNC: 0.4 MG/DL (ref 0.2–1)
BUN SERPL-MCNC: 19 MG/DL (ref 6–20)
BUN/CREAT SERPL: 15 (ref 12–20)
CALCIUM SERPL-MCNC: 8.6 MG/DL (ref 8.5–10.1)
CHLORIDE SERPL-SCNC: 107 MMOL/L (ref 97–108)
CO2 SERPL-SCNC: 26 MMOL/L (ref 21–32)
CREAT SERPL-MCNC: 1.25 MG/DL (ref 0.55–1.02)
DIFFERENTIAL METHOD BLD: NORMAL
EOSINOPHIL # BLD: 0.2 K/UL (ref 0–0.4)
EOSINOPHIL NFR BLD: 2 % (ref 0–7)
ERYTHROCYTE [DISTWIDTH] IN BLOOD BY AUTOMATED COUNT: 13.5 % (ref 11.5–14.5)
GLOBULIN SER CALC-MCNC: 3.6 G/DL (ref 2–4)
GLUCOSE SERPL-MCNC: 143 MG/DL (ref 65–100)
HCT VFR BLD AUTO: 41.6 % (ref 35–47)
HGB BLD-MCNC: 14.8 G/DL (ref 11.5–16)
IMM GRANULOCYTES # BLD AUTO: 0 K/UL (ref 0–0.04)
IMM GRANULOCYTES NFR BLD AUTO: 0 % (ref 0–0.5)
LIPASE SERPL-CCNC: 151 U/L (ref 73–393)
LYMPHOCYTES # BLD: 2.2 K/UL (ref 0.8–3.5)
LYMPHOCYTES NFR BLD: 23 % (ref 12–49)
MCH RBC QN AUTO: 33.1 PG (ref 26–34)
MCHC RBC AUTO-ENTMCNC: 35.6 G/DL (ref 30–36.5)
MCV RBC AUTO: 93.1 FL (ref 80–99)
MONOCYTES # BLD: 0.6 K/UL (ref 0–1)
MONOCYTES NFR BLD: 6 % (ref 5–13)
NEUTS SEG # BLD: 6.6 K/UL (ref 1.8–8)
NEUTS SEG NFR BLD: 68 % (ref 32–75)
NRBC # BLD: 0 K/UL (ref 0–0.01)
NRBC BLD-RTO: 0 PER 100 WBC
PLATELET # BLD AUTO: 258 K/UL (ref 150–400)
PMV BLD AUTO: 11 FL (ref 8.9–12.9)
POTASSIUM SERPL-SCNC: 3.6 MMOL/L (ref 3.5–5.1)
PROT SERPL-MCNC: 7 G/DL (ref 6.4–8.2)
RBC # BLD AUTO: 4.47 M/UL (ref 3.8–5.2)
RBC MORPH BLD: NORMAL
SODIUM SERPL-SCNC: 139 MMOL/L (ref 136–145)
WBC # BLD AUTO: 9.7 K/UL (ref 3.6–11)

## 2020-04-29 PROCEDURE — 80053 COMPREHEN METABOLIC PANEL: CPT

## 2020-04-29 PROCEDURE — 99282 EMERGENCY DEPT VISIT SF MDM: CPT

## 2020-04-29 PROCEDURE — 96374 THER/PROPH/DIAG INJ IV PUSH: CPT

## 2020-04-29 PROCEDURE — 76856 US EXAM PELVIC COMPLETE: CPT

## 2020-04-29 PROCEDURE — 76830 TRANSVAGINAL US NON-OB: CPT

## 2020-04-29 PROCEDURE — 83690 ASSAY OF LIPASE: CPT

## 2020-04-29 PROCEDURE — 96375 TX/PRO/DX INJ NEW DRUG ADDON: CPT

## 2020-04-29 PROCEDURE — 74177 CT ABD & PELVIS W/CONTRAST: CPT

## 2020-04-29 PROCEDURE — 85025 COMPLETE CBC W/AUTO DIFF WBC: CPT

## 2020-04-29 PROCEDURE — 74011250636 HC RX REV CODE- 250/636: Performed by: PHYSICIAN ASSISTANT

## 2020-04-29 PROCEDURE — 36415 COLL VENOUS BLD VENIPUNCTURE: CPT

## 2020-04-29 PROCEDURE — 74011636320 HC RX REV CODE- 636/320: Performed by: EMERGENCY MEDICINE

## 2020-04-29 RX ORDER — HYDROCODONE BITARTRATE AND ACETAMINOPHEN 5; 325 MG/1; MG/1
1 TABLET ORAL
Qty: 12 TAB | Refills: 0 | Status: SHIPPED | OUTPATIENT
Start: 2020-04-29 | End: 2020-05-02

## 2020-04-29 RX ORDER — KETOROLAC TROMETHAMINE 30 MG/ML
15 INJECTION, SOLUTION INTRAMUSCULAR; INTRAVENOUS
Status: COMPLETED | OUTPATIENT
Start: 2020-04-29 | End: 2020-04-29

## 2020-04-29 RX ORDER — FENTANYL CITRATE 50 UG/ML
50 INJECTION, SOLUTION INTRAMUSCULAR; INTRAVENOUS
Status: COMPLETED | OUTPATIENT
Start: 2020-04-29 | End: 2020-04-29

## 2020-04-29 RX ORDER — SODIUM CHLORIDE 0.9 % (FLUSH) 0.9 %
10 SYRINGE (ML) INJECTION
Status: COMPLETED | OUTPATIENT
Start: 2020-04-29 | End: 2020-04-29

## 2020-04-29 RX ORDER — CANDESARTAN 4 MG/1
TABLET ORAL DAILY
COMMUNITY
End: 2020-07-16

## 2020-04-29 RX ADMIN — FENTANYL CITRATE 50 MCG: 50 INJECTION INTRAMUSCULAR; INTRAVENOUS at 14:57

## 2020-04-29 RX ADMIN — IOPAMIDOL 100 ML: 755 INJECTION, SOLUTION INTRAVENOUS at 15:30

## 2020-04-29 RX ADMIN — Medication 10 ML: at 15:30

## 2020-04-29 RX ADMIN — FENTANYL CITRATE 50 MCG: 50 INJECTION INTRAMUSCULAR; INTRAVENOUS at 17:33

## 2020-04-29 RX ADMIN — KETOROLAC TROMETHAMINE 15 MG: 30 INJECTION, SOLUTION INTRAMUSCULAR at 14:55

## 2020-04-29 NOTE — ED PROVIDER NOTES
EMERGENCY DEPARTMENT HISTORY AND PHYSICAL EXAM      Date: 4/29/2020  Patient Name: Rosalind Bailon    History of Presenting Illness     Chief Complaint   Patient presents with    Abdominal Pain     RLQ       History Provided By: Patient    HPI: Rosalind Bailon, 52 y.o. female with PMHx significant for hypertension, presents via EMS to the ED with cc of abdominal pain onset yesterday. The pain is located in her right lower quadrant, intermittent but severe in intensity at times. It is worse with movement. She had a kidney stone 20 years ago but says that this pain feels different. She was recently told that she has an ovarian cyst but she is not sure which side it is on. Abdominal surgical history includes cholecystectomy. EMS gave her 100 mcg of fentanyl in route with some improvement of pain. She denies fever, nausea, vomiting, diarrhea, dysuria, hematuria, vaginal bleeding, vaginal discharge. She still occasionally has her menstrual cycle. There are no other complaints, changes, or physical findings at this time. PCP: Denzel Valente NP    No current facility-administered medications on file prior to encounter. Current Outpatient Medications on File Prior to Encounter   Medication Sig Dispense Refill    candesartan (ATACAND) 4 mg tablet Take  by mouth daily. 16-12.5mg tablet, once daily      hydroCHLOROthiazide (HYDRODIURIL) 25 mg tablet Take 1 Tab by mouth daily. 30 Tab 3    budesonide-formoteroL (SYMBICORT) 160-4.5 mcg/actuation HFAA Take 2 Puffs by inhalation two (2) times a day. 1 Inhaler 4    DM/p-ephed/acetaminoph/doxylam (NYQUIL D PO) Take  by mouth.          Past History     Past Medical History:  Past Medical History:   Diagnosis Date    Anemia 2/18/2012    Cocaine abuse affecting pregnancy ([facility] [address]     Essential hypertension     Hypertension     SVT (supraventricular tachycardia) ([facility] [address] 11/16/2018       Past Surgical History:  Past Surgical History:   Procedure Laterality Date    HX CHOLECYSTECTOMY      HX GYN             Family History:  Family History   Problem Relation Age of Onset    Hypertension Father     Alcohol abuse Father     Breast Cancer Sister     Cancer Brother         Throat versus Thyroid    No Known Problems Son     No Known Problems Son     No Known Problems Daughter        Social History:  Social History     Tobacco Use    Smoking status: Current Every Day Smoker     Packs/day: 1.00    Smokeless tobacco: Never Used   Substance Use Topics    Alcohol use: Not Currently     Alcohol/week: 1.0 - 2.0 standard drinks     Types: 1 - 2 Cans of beer per week     Comment: a week    Drug use: Yes     Frequency: 1.0 times per week     Types: Cocaine     Comment: Pt stated she used within the past week       Allergies: Allergies   Allergen Reactions    Hydralazine Unknown (comments)     jitteriness    Hydromorphone Itching     morphine    Morphine Itching    Sulfa (Sulfonamide Antibiotics) Nausea and Vomiting         Review of Systems   Review of Systems   Constitutional: Negative for chills and fever. HENT: Negative for ear pain and sore throat. Eyes: Negative for redness and visual disturbance. Respiratory: Negative for cough and shortness of breath. Cardiovascular: Negative for chest pain and palpitations. Gastrointestinal: Positive for abdominal pain. Negative for constipation, diarrhea, nausea and vomiting. Genitourinary: Negative for dysuria and hematuria. Musculoskeletal: Negative for back pain and gait problem. Skin: Negative for rash and wound. Neurological: Negative for dizziness and headaches. Psychiatric/Behavioral: Negative for behavioral problems and confusion. All other systems reviewed and are negative. Physical Exam   Physical Exam  Vitals signs and nursing note reviewed. Constitutional:       Appearance: She is well-developed. Comments: Patient is uncomfortable appearing.    HENT:      Head: Normocephalic and atraumatic. Eyes:      Conjunctiva/sclera: Conjunctivae normal.      Pupils: Pupils are equal, round, and reactive to light. Neck:      Musculoskeletal: Normal range of motion and neck supple. Cardiovascular:      Rate and Rhythm: Normal rate and regular rhythm. Heart sounds: Normal heart sounds. Pulmonary:      Effort: Pulmonary effort is normal.      Breath sounds: Normal breath sounds. Abdominal:      General: Bowel sounds are normal. There is no distension. Palpations: Abdomen is soft. Tenderness: There is abdominal tenderness in the right lower quadrant. There is no right CVA tenderness or left CVA tenderness. Musculoskeletal: Normal range of motion. Skin:     General: Skin is warm and dry. Findings: No rash. Neurological:      Mental Status: She is alert and oriented to person, place, and time. GCS: GCS eye subscore is 4. GCS verbal subscore is 5. GCS motor subscore is 6. Cranial Nerves: No cranial nerve deficit. Sensory: No sensory deficit. Psychiatric:         Behavior: Behavior normal.           Diagnostic Study Results     Labs -     Recent Results (from the past 12 hour(s))   METABOLIC PANEL, COMPREHENSIVE    Collection Time: 04/29/20  2:47 PM   Result Value Ref Range    Sodium 139 136 - 145 mmol/L    Potassium 3.6 3.5 - 5.1 mmol/L    Chloride 107 97 - 108 mmol/L    CO2 26 21 - 32 mmol/L    Anion gap 6 5 - 15 mmol/L    Glucose 143 (H) 65 - 100 mg/dL    BUN 19 6 - 20 MG/DL    Creatinine 1.25 (H) 0.55 - 1.02 MG/DL    BUN/Creatinine ratio 15 12 - 20      GFR est AA 55 (L) >60 ml/min/1.73m2    GFR est non-AA 46 (L) >60 ml/min/1.73m2    Calcium 8.6 8.5 - 10.1 MG/DL    Bilirubin, total 0.4 0.2 - 1.0 MG/DL    ALT (SGPT) 32 12 - 78 U/L    AST (SGOT) 20 15 - 37 U/L    Alk.  phosphatase 68 45 - 117 U/L    Protein, total 7.0 6.4 - 8.2 g/dL    Albumin 3.4 (L) 3.5 - 5.0 g/dL    Globulin 3.6 2.0 - 4.0 g/dL    A-G Ratio 0.9 (L) 1.1 - 2.2     CBC WITH AUTOMATED DIFF    Collection Time: 04/29/20  2:47 PM   Result Value Ref Range    WBC 9.7 3.6 - 11.0 K/uL    RBC 4.47 3.80 - 5.20 M/uL    HGB 14.8 11.5 - 16.0 g/dL    HCT 41.6 35.0 - 47.0 %    MCV 93.1 80.0 - 99.0 FL    MCH 33.1 26.0 - 34.0 PG    MCHC 35.6 30.0 - 36.5 g/dL    RDW 13.5 11.5 - 14.5 %    PLATELET 562 849 - 738 K/uL    MPV 11.0 8.9 - 12.9 FL    NRBC 0.0 0  WBC    ABSOLUTE NRBC 0.00 0.00 - 0.01 K/uL    NEUTROPHILS 68 32 - 75 %    LYMPHOCYTES 23 12 - 49 %    MONOCYTES 6 5 - 13 %    EOSINOPHILS 2 0 - 7 %    BASOPHILS 1 0 - 1 %    IMMATURE GRANULOCYTES 0 0.0 - 0.5 %    ABS. NEUTROPHILS 6.6 1.8 - 8.0 K/UL    ABS. LYMPHOCYTES 2.2 0.8 - 3.5 K/UL    ABS. MONOCYTES 0.6 0.0 - 1.0 K/UL    ABS. EOSINOPHILS 0.2 0.0 - 0.4 K/UL    ABS. BASOPHILS 0.1 0.0 - 0.1 K/UL    ABS. IMM. GRANS. 0.0 0.00 - 0.04 K/UL    DF AUTOMATED      RBC COMMENTS NORMOCYTIC, NORMOCHROMIC     LIPASE    Collection Time: 04/29/20  2:47 PM   Result Value Ref Range    Lipase 151 73 - 393 U/L       Radiologic Studies -   US PELV NON OBS   Final Result   IMPRESSION:    1. A left ovarian simple cyst or cystic lesion measures 6.1 cm in greatest   diameter. Consider nonemergent gynecologic consultation. At a minimum, follow-up   pelvic ultrasound is recommended in one year. 2. Normal appearance of the uterus. Nonvisualized right ovary. US TRANSVAGINAL   Final Result   IMPRESSION:    1. A left ovarian simple cyst or cystic lesion measures 6.1 cm in greatest   diameter. Consider nonemergent gynecologic consultation. At a minimum, follow-up   pelvic ultrasound is recommended in one year. 2. Normal appearance of the uterus. Nonvisualized right ovary. CT ABD PELV W CONT   Final Result   IMPRESSION:      1. Fusiform enlargement of the inferior right rectus abdominis muscle consistent   with intramuscular hematoma. Clinical correlation recommended. Spontaneous   hematomas or unusual in the absence of masses or blood dyscrasia. Follow-up of   this finding to complete resolution is recommended, or earlier contrast-enhanced   MRI is recommended if needed can exclude underlying neoplastic process. 2. Left adnexal cystic lesion noted on prior CT of 7/3/2019 has not resolved and   is in fact larger. Findings raise substantial concern for neoplastic process   such as cystadenoma or other neoplasm. Gynecologic consultation is recommended. The lesion can be further evaluated with contrast-enhanced MRI. CT Results  (Last 48 hours)               04/29/20 1530  CT ABD PELV W CONT Final result    Impression:  IMPRESSION:       1. Fusiform enlargement of the inferior right rectus abdominis muscle consistent   with intramuscular hematoma. Clinical correlation recommended. Spontaneous   hematomas or unusual in the absence of masses or blood dyscrasia. Follow-up of   this finding to complete resolution is recommended, or earlier contrast-enhanced   MRI is recommended if needed can exclude underlying neoplastic process. 2. Left adnexal cystic lesion noted on prior CT of 7/3/2019 has not resolved and   is in fact larger. Findings raise substantial concern for neoplastic process   such as cystadenoma or other neoplasm. Gynecologic consultation is recommended. The lesion can be further evaluated with contrast-enhanced MRI. Narrative:  EXAM: CT ABD PELV W CONT       INDICATION: RLQ pain onset yesterday       COMPARISON: CT 7/3/2019        CONTRAST: 100 mL of Isovue-370. TECHNIQUE:    Following the uneventful intravenous administration of contrast, thin axial   images were obtained through the abdomen and pelvis. Coronal and sagittal   reconstructions were generated. Oral contrast was not, per order, administered. CT dose reduction was achieved through use of a standardized protocol tailored   for this examination and automatic exposure control for dose modulation.        The lack of oral contrast material diminishes the capacity of CT to evaluate the   bowel and adjacent structures. FINDINGS:    LOWER THORAX: No significant abnormality in the incidentally imaged lower chest.   LIVER: Hepatic steatosis again demonstrated, locally accentuated along the right   margin of the fissure for the ligamentum teres. No biliary ductal dilation or   mass lesion demonstrated. BILIARY TREE: Status post cholecystectomy. CBD is not dilated. SPLEEN: within normal limits. PANCREAS: No mass or ductal dilatation. ADRENALS: Unremarkable. KIDNEYS: Several subcentimeter hypoattenuating lesions in the kidneys   bilaterally, probable cysts. STOMACH: Unremarkable. SMALL BOWEL: No dilatation or wall thickening. COLON: Scattered, most numerous in sigmoid colon. APPENDIX: Unremarkable. PERITONEUM: No ascites or pneumoperitoneum. RETROPERITONEUM: No lymphadenopathy or aortic aneurysm. REPRODUCTIVE ORGANS: Left adnexal cystic lesion with posterior mural thickening   again shown, measuring 7.3 cm AP. 5.7 cm previously, 5.0 cm transverse compared   with 4.2 cm previously, and 6.2 cm craniocaudal compared with 5.7 cm previously. URINARY BLADDER: No mass or calculus. BONES: Within normal limits. ABDOMINAL WALL: Fusiform enlargement associated with heterogeneously increased   signal within the right rectus abdominis muscle inferiorly associated with   perimuscular edema-like attenuation, showing approximate size of 11 cm   craniocaudal, 4.0 cm transverse and 4.8 cm AP. ADDITIONAL COMMENTS: N/A               CXR Results  (Last 48 hours)    None            Medical Decision Making   I am the first provider for this patient. I reviewed the vital signs, available nursing notes, past medical history, past surgical history, family history and social history. Vital Signs-Reviewed the patient's vital signs.   Patient Vitals for the past 12 hrs:   Temp Pulse Resp BP SpO2   04/29/20 1427 98.3 °F (36.8 °C) 76 16 (!) 179/104 97 %         Records Reviewed: Nursing Notes and Old Medical Records      Provider Notes (Medical Decision Making):   DDx: Appendicitis, ovarian cyst, ovarian torsion, ureteral stone, UTI, pyelonephritis    Patient presents with intermittent right lower quadrant abdominal pain. She is uncomfortable appearing but does not have a fever and no peritoneal signs on abdominal exam.  Plan for labs, CT of the abdomen with IV contrast for further evaluation. ED Course:   Initial assessment performed. The patients presenting problems have been discussed, and they are in agreement with the care plan formulated and outlined with them. I have encouraged them to ask questions as they arise throughout their visit. ED Course as of Apr 29 1819   Wed Apr 29, 2020 1620 Discussed results with patient. Her pain has improved. She does not remember any injury to abdomen that could have caused hematoma. She does not take anticoagulant. [SANTOS]   1628 Discussed case with Dr. Pancho Fontenot, [facility]. Karri Hanson further evaluation with pelvic ultrasound at this time. She can follow up as outpatient for MRI if needed. [SANTOS]   1819 Discussed ultrasound results with patient. Discussed importance of follow up with PCP for further management of intramuscular hematoma and gynecology for further evaluation of ovarian cyst.    [SANTOS]      ED Course User Index  [SANTOS] NIIK Penn         Disposition:  6:19 PM    The patient has been re-evaluated and is ready for discharge. Reviewed available results with patient. Counseled patient on diagnosis and care plan. Patient has expressed understanding, and all questions have been answered. Patient agrees with plan and agrees to follow up as recommended, or to return to the ED if their symptoms worsen. Discharge instructions have been provided and explained to the patient, along with reasons to return to the ED. PLAN:  1.    Current Discharge Medication List      START taking these medications Details   HYDROcodone-acetaminophen (Norco) 5-325 mg per tablet Take 1 Tab by mouth every four (4) hours as needed for Pain for up to 3 days. Max Daily Amount: 6 Tabs. Qty: 12 Tab, Refills: 0    Associated Diagnoses: Intramuscular hematoma           2. Follow-up Information     Follow up With Specialties Details Why Contact Info    Maribel Valente NP Nurse Practitioner Schedule an appointment as soon as possible for a visit in 1 week for a recheck [address] [facility] [address] [facility]      Aníbal Hale MD Obstetrics & Gynecology, Obstetrics, Gynecology Schedule an appointment as soon as possible for a visit for further evaluation of ovarian cyst [address] [facility]      Kenny Hays MD Obstetrics & Gynecology, Gynecology, Obstetrics  if unable to get an appointment with other gynecologist [address] [place] [address]  [address] [address]  [phone]      [facility] EMERGENCY DEPT Emergency Medicine Go to If symptoms worsen [address]  [address] [facility]  [phone]        Return to ED if worse     Diagnosis     Clinical Impression:   1. Left ovarian cyst    2. Intramuscular hematoma    3. Essential hypertension            Scotty Moeller.  NANCI Singletary

## 2020-04-29 NOTE — PROGRESS NOTES
Nurse Clarification of the Prior to Admission Medication Regimen     The patient was interviewed regarding clarification of the prior to admission medication regimen. The individual(s) listed above were questioned regarding the patient's use of any other inhalers, topical products, over the counter medications, herbal medications, vitamin products or ophthalmic/nasal/otic medication use. Information Obtained From: Patient    Recommendations/Findings: The following amendments were made to the patient's active medication list on file at [facility]:     1) Additions:    Candesartan 16-12.5tab    2) Removals:    None    3) Changes:   None       PTA medication list was corrected to the following:     Prior to Admission Medications   Prescriptions Last Dose Informant Taking? DM/p-ephed/acetaminoph/doxylam (NYQUIL D PO) Not Taking at Unknown time  No   Sig: Take  by mouth.   budesonide-formoteroL (SYMBICORT) 160-4.5 mcg/actuation HFAA 3/29/2020 at Unknown time  Yes   Sig: Take 2 Puffs by inhalation two (2) times a day. candesartan (ATACAND) 4 mg tablet   Yes   Sig: Take  by mouth daily. 16-12.5mg tablet, once daily   hydroCHLOROthiazide (HYDRODIURIL) 25 mg tablet 4/29/2020 at Unknown time  Yes   Sig: Take 1 Tab by mouth daily.       Facility-Administered Medications: None        Thank you,  Jazmyne Leon RN

## 2020-04-29 NOTE — ED NOTES
Nneka Woodall. reviewed discharge instructions with the patient. The patient verbalized understanding. All questions and concerns were addressed. The patient declined a wheelchair and is discharged ambulatory in the care of family members with instructions and prescriptions in hand. Pt is alert and oriented x 4. Respirations are clear and unlabored.

## 2020-04-29 NOTE — DISCHARGE INSTRUCTIONS
Ct Abd Pelv W Cont    Result Date: 4/29/2020  IMPRESSION: 1. Fusiform enlargement of the inferior right rectus abdominis muscle consistent with intramuscular hematoma. Clinical correlation recommended. Spontaneous hematomas or unusual in the absence of masses or blood dyscrasia. Follow-up of this finding to complete resolution is recommended, or earlier contrast-enhanced MRI is recommended if needed can exclude underlying neoplastic process. 2. Left adnexal cystic lesion noted on prior CT of 7/3/2019 has not resolved and is in fact larger. Findings raise substantial concern for neoplastic process such as cystadenoma or other neoplasm. Gynecologic consultation is recommended. The lesion can be further evaluated with contrast-enhanced MRI. Us Transvaginal    Result Date: 4/29/2020  IMPRESSION: 1. A left ovarian simple cyst or cystic lesion measures 6.1 cm in greatest diameter. Consider nonemergent gynecologic consultation. At a minimum, follow-up pelvic ultrasound is recommended in one year. 2. Normal appearance of the uterus. Nonvisualized right ovary. Us Pelv Non Obs    Result Date: 4/29/2020  IMPRESSION: 1. A left ovarian simple cyst or cystic lesion measures 6.1 cm in greatest diameter. Consider nonemergent gynecologic consultation. At a minimum, follow-up pelvic ultrasound is recommended in one year. 2. Normal appearance of the uterus. Nonvisualized right ovary. Patient Education        Functional Ovarian Cyst: Care Instructions  Your Care Instructions    A functional ovarian cyst is a sac that forms on the surface of a woman's ovary during ovulation. The sac holds a maturing egg. Usually the sac goes away after the egg is released. But if the egg is not released, or if the sac closes up after the egg is released, the sac can swell up with fluid and form a cyst.  Functional ovarian cysts are different than ovarian growths caused by other problems, such as cancer.  Most functional ovarian cysts cause no symptoms and go away on their own. Some cause mild pain. Others can cause severe pain when they rupture or bleed. Follow-up care is a key part of your treatment and safety. Be sure to make and go to all appointments, and call your doctor if you are having problems. It's also a good idea to know your test results and keep a list of the medicines you take. How can you care for yourself at home? · Use heat, such as a hot water bottle, a heating pad set on low, or a warm bath, to relax tense muscles and relieve cramping. · Be safe with medicines. Take pain medicines exactly as directed. ? If the doctor gave you a prescription medicine for pain, take it as prescribed. ? If you are not taking a prescription pain medicine, ask your doctor if you can take an over-the-counter medicine. · Avoid constipation. Make sure you drink enough fluids and include fruits, vegetables, and fiber in your diet each day. Constipation does not cause ovarian cysts, but it may make your pelvic pain worse. When should you call for help? Call your doctor now or seek immediate medical care if:    · You have severe vaginal bleeding.     · You have new or worse belly or pelvic pain.    Watch closely for changes in your health, and be sure to contact your doctor if:    · You have unusual vaginal bleeding.     · You do not get better as expected. Where can you learn more? Go to http://ian-norbert.info/  Enter I547 in the search box to learn more about \"Functional Ovarian Cyst: Care Instructions. \"  Current as of: November 7, 2019Content Version: 12.4  © 9913-6887 Healthwise, Incorporated. Care instructions adapted under license by [employer] (which disclaims liability or warranty for this information).  If you have questions about a medical condition or this instruction, always ask your healthcare professional. [address] any warranty or liability for your use of this information. Patient Education        Hematoma: Care Instructions  Your Care Instructions    A hematoma is a bad bruise. It happens when an injury causes blood to collect and pool under the skin. The pooling blood gives the skin a spongy, rubbery, lumpy feel. A hematoma usually is not a cause for concern. It is not the same thing as a blood clot in a vein, and it does not cause blood clots. Follow-up care is a key part of your treatment and safety. Be sure to make and go to all appointments, and call your doctor if you are having problems. It's also a good idea to know your test results and keep a list of the medicines you take. How can you care for yourself at home? · Rest and protect the bruised area. · Put ice or a cold pack on the area for 10 to 20 minutes at a time. · Prop up the bruised area on a pillow when you ice it or anytime you sit or lie down during the next 3 days. Try to keep it above the level of your heart. This will help reduce swelling. · Wrapping the bruised area with an elastic bandage such as an Ace wrap will help decrease swelling. Don't wrap it too tightly, as this can cause more swelling below the affected area. · Be safe with medicines. Read and follow all instructions on the label. ? If the doctor gave you a prescription medicine for pain, take it as prescribed. ? If you are not taking a prescription pain medicine, ask your doctor if you can take an over-the-counter medicine. · Do not take two or more pain medicines at the same time unless the doctor told you to. Many pain medicines have acetaminophen, which is Tylenol. Too much acetaminophen (Tylenol) can be harmful. When should you call for help? Call your doctor now or seek immediate medical care if:    · You have signs of skin infection, such as:  ? Increased pain, swelling, warmth, or redness. ? Red streaks leading from the area. ? Pus draining from the area.   ? A fever.    Watch closely for changes in your health, and be sure to contact your doctor if:    · The bruise lasts longer than 4 weeks.     · The bruise gets bigger or becomes more painful.     · You do not get better as expected. Where can you learn more? Go to http://ian-norbert.info/  Enter P911 in the search box to learn more about \"Hematoma: Care Instructions. \"  Current as of: June 26, 2019Content Version: 12.4  © 1905-4466 Healthwise, Incorporated. Care instructions adapted under license by [employer] (which disclaims liability or warranty for this information). If you have questions about a medical condition or this instruction, always ask your healthcare professional. [address] any warranty or liability for your use of this information.

## 2020-04-29 NOTE — ED TRIAGE NOTES
Pt was brought to the ED by EMS. Pt stated that RLQ abdominal pain started around 1800 yesterday. Pt stated she thought she ha d az pulled muscle. Pt refused EMS transport and then EMS returned and transported her to [facility]. Pt was given 100mcg of fentyal in route to the ED.   Pt has Hx of HTN

## 2020-04-30 ENCOUNTER — PATIENT OUTREACH (OUTPATIENT)
Dept: FAMILY MEDICINE CLINIC | Age: 50
End: 2020-04-30

## 2020-04-30 NOTE — PROGRESS NOTES
....Patient contacted regarding COVID-19  risk. Care Transition Nurse/ Ambulatory Care Manager contacted the patient by telephone to perform post discharge assessment. Verified name and  with patient as identifiers. Provided introduction to self, and explanation of the CTN/ACM role, and reason for call due to risk factors for infection and/or exposure to COVID-19. Symptoms reviewed with patient who verbalized the following symptoms: no new symptoms and no worsening symptoms. Patient reports abdominal pain. Due to no new or worsening symptoms encounter was not routed to provider for escalation. Patient stated she will call PCP and did  Not want assistance from this ACM at this time to schedule a follow up PCP appointment. Patient has following risk factors of: none reported at this time. CTN/ACM reviewed discharge instructions, medical action plan and red flags such as increased shortness of breath, increasing fever and signs of decompensation with patient who verbalized understanding. Discussed exposure protocols and quarantine with CDC Guidelines What to do if you are sick with coronavirus disease 2019.  Patient was given an opportunity for questions and concerns. The patient  Did not want the following numbers to call about questions or concerns-she stated she will call PCP and follow up--  to contact the [facility] exposure line [phone], local [facility] department [facility]  ([phone]) and PCP office for questions related to their healthcare. CTN/ACM provided contact information for future needs. Reviewed and educated patient on any new and changed medications related to discharge diagnosis. Patient/family/caregiver [facility] and agrees to enroll no  Patient's preferred e-mail:  n/a  Patient's preferred phone number: n/a    Plan to follow up with patient in 10-14 days  based on severity of symptoms and risk factors. DMB

## 2020-05-01 ENCOUNTER — VIRTUAL VISIT (OUTPATIENT)
Dept: FAMILY MEDICINE CLINIC | Age: 50
End: 2020-05-01

## 2020-05-01 DIAGNOSIS — S30.1XXS ABDOMINAL WALL HEMATOMA, SEQUELA: Primary | ICD-10-CM

## 2020-05-01 DIAGNOSIS — N83.202 CYST OF LEFT OVARY: ICD-10-CM

## 2020-05-01 DIAGNOSIS — R10.9 ABDOMINAL PAIN, UNSPECIFIED ABDOMINAL LOCATION: ICD-10-CM

## 2020-05-01 NOTE — PATIENT INSTRUCTIONS
Follow up with a behavioral therapist for evaluation and management of your mood. If you do not already see a therapist, you can find a list through [employer] by calling the mental help line number on the back of your insurance card. Here are a few local providers: 
[facility]: [phone] [facility]  [phone] (can prescribe medications) [facility] [phone] (can prescribe medications) [address] [place] [address] [phone] (can prescribe medications) [facility] Hematoma: Care Instructions Your Care Instructions A hematoma is a bad bruise. It happens when an injury causes blood to collect and pool under the skin. The pooling blood gives the skin a spongy, rubbery, lumpy feel. A hematoma usually is not a cause for concern. It is not the same thing as a blood clot in a vein, and it does not cause blood clots. Follow-up care is a key part of your treatment and safety. Be sure to make and go to all appointments, and call your doctor if you are having problems. It's also a good idea to know your test results and keep a list of the medicines you take. How can you care for yourself at home? · Rest and protect the bruised area. · Put ice or a cold pack on the area for 10 to 20 minutes at a time. · Prop up the bruised area on a pillow when you ice it or anytime you sit or lie down during the next 3 days. Try to keep it above the level of your heart. This will help reduce swelling. · Wrapping the bruised area with an elastic bandage such as an Ace wrap will help decrease swelling. Don't wrap it too tightly, as this can cause more swelling below the affected area. · Be safe with medicines. Read and follow all instructions on the label. ? If the doctor gave you a prescription medicine for pain, take it as prescribed. ? If you are not taking a prescription pain medicine, ask your doctor if you can take an over-the-counter medicine. · Do not take two or more pain medicines at the same time unless the doctor told you to. Many pain medicines have acetaminophen, which is Tylenol. Too much acetaminophen (Tylenol) can be harmful. When should you call for help? Call your doctor now or seek immediate medical care if: 
  · You have signs of skin infection, such as: 
? Increased pain, swelling, warmth, or redness. ? Red streaks leading from the area. ? Pus draining from the area. ? A fever.  
 Watch closely for changes in your health, and be sure to contact your doctor if: 
  · The bruise lasts longer than 4 weeks.  
  · The bruise gets bigger or becomes more painful.  
  · You do not get better as expected. Where can you learn more? Go to http://ian-norbert.info/ Enter P911 in the search box to learn more about \"Hematoma: Care Instructions. \" Current as of: June 26, 2019Content Version: 12.4 © 5234-2581 [employer]. Care instructions adapted under license by [employer] (which disclaims liability or warranty for this information). If you have questions about a medical condition or this instruction, always ask your healthcare professional. [address] any warranty or liability for your use of this information. Abdominal Pain: Care Instructions Your Care Instructions Abdominal pain has many possible causes. Some aren't serious and get better on their own in a few days. Others need more testing and treatment. If your pain continues or gets worse, you need to be rechecked and may need more tests to find out what is wrong. You may need surgery to correct the problem. Don't ignore new symptoms, such as fever, nausea and vomiting, urination problems, pain that gets worse, and dizziness. These may be signs of a more serious problem. Your doctor may have recommended a follow-up visit in the next 8 to 12 hours.  If you are not getting better, you may need more tests or treatment. The doctor has checked you carefully, but problems can develop later. If you notice any problems or new symptoms, get medical treatment right away. Follow-up care is a key part of your treatment and safety. Be sure to make and go to all appointments, and call your doctor if you are having problems. It's also a good idea to know your test results and keep a list of the medicines you take. How can you care for yourself at home? · Rest until you feel better. · To prevent dehydration, drink plenty of fluids, enough so that your urine is light yellow or clear like water. Choose water and other caffeine-free clear liquids until you feel better. If you have kidney, heart, or liver disease and have to limit fluids, talk with your doctor before you increase the amount of fluids you drink. · If your stomach is upset, eat mild foods, such as rice, dry toast or crackers, bananas, and applesauce. Try eating several small meals instead of two or three large ones. · Wait until 48 hours after all symptoms have gone away before you have spicy foods, alcohol, and drinks that contain caffeine. · Do not eat foods that are high in fat. · Avoid anti-inflammatory medicines such as aspirin, ibuprofen (Advil, Motrin), and naproxen (Aleve). These can cause stomach upset. Talk to your doctor if you take daily aspirin for another health problem. When should you call for help? Call 911 anytime you think you may need emergency care. For example, call if: 
  · You passed out (lost consciousness).  
  · You pass maroon or very bloody stools.  
  · You vomit blood or what looks like coffee grounds.  
  · You have new, severe belly pain.  
 Call your doctor now or seek immediate medical care if: 
  · Your pain gets worse, especially if it becomes focused in one area of your belly.  
  · You have a new or higher fever.  
  · Your stools are black and look like tar, or they have streaks of blood.   · You have unexpected vaginal bleeding.  
  · You have symptoms of a urinary tract infection. These may include: 
? Pain when you urinate. ? Urinating more often than usual. 
? Blood in your urine.  
  · You are dizzy or lightheaded, or you feel like you may faint.  
 Watch closely for changes in your health, and be sure to contact your doctor if: 
  · You are not getting better after 1 day (24 hours). Where can you learn more? Go to http://ian-norbert.info/ Enter E079 in the search box to learn more about \"Abdominal Pain: Care Instructions. \" Current as of: June 26, 2019Content Version: 12.4 © 1339-8937 [employer]. Care instructions adapted under license by [employer] (which disclaims liability or warranty for this information). If you have questions about a medical condition or this instruction, always ask your healthcare professional. [address] any warranty or liability for your use of this information.

## 2020-05-01 NOTE — PROGRESS NOTES
Christel Villarreal [facility] Note      Subjective:     Chief Complaint   Patient presents with    Follow-up     Art Brood is a 52y.o. year old female who presents for virtual evaluation of the following:      Abdominal Pain:  Onset 2 days ago  Diagnose with left ovarian cyst and hematoma  Had been coughing a lot recently  Sx: stabbing  Current Pain rating: 3  Treatment: hydrocodone  Endorse COPD with crhronic cough and smoking  Denies use of any blood thinning medications, nose bleeding, vaginal bleeding, bruising      4/29/2020 CT Abdomen & Pelvis   IMPRESSION:  1. Fusiform enlargement of the inferior right rectus abdominis muscle consistent  with intramuscular hematoma. Clinical correlation recommended. Spontaneous  hematomas or unusual in the absence of masses or blood dyscrasia. Follow-up of  this finding to complete resolution is recommended, or earlier contrast-enhanced  MRI is recommended if needed can exclude underlying neoplastic process. 2. Left adnexal cystic lesion noted on prior CT of 7/3/2019 has not resolved and  is in fact larger. Findings raise substantial concern for neoplastic process  such as cystadenoma or other neoplasm. Gynecologic consultation is recommended. The lesion can be further evaluated with contrast-enhanced MRI. Review of Systems   Pertinent positives and negative per HPI. All other systems  reviewed are negative for a Comprehensive ROS (10+).        Past Medical History:   Diagnosis Date    Anemia 2/18/2012    Cocaine abuse affecting pregnancy ([address]     Essential hypertension     Hypertension     SVT [facility]ra[facility]ricular tachycardia) [address] [address] 11/16/2018        Social History     Socioeconomic History    Marital status: LEGALLY      Spouse name: Not on file    Number of children: Not on file    Years of education: Not on file    Highest education level: Not on file   Occupational History    Not on file   Social Needs    Financial resource strain: Not on file    Food insecurity     Worry: Not on file     Inability: Not on file    Transportation needs     Medical: Not on file     Non-medical: Not on file   Tobacco Use    Smoking status: Current Every Day Smoker     Packs/day: 1.00    Smokeless tobacco: Never Used   Substance and Sexual Activity    Alcohol use: Not Currently     Alcohol/week: 1.0 - 2.0 standard drinks     Types: 1 - 2 Cans of beer per week     Comment: a week    Drug use: Yes     Frequency: 1.0 times per week     Types: Cocaine     Comment: Pt stated she used within the past week    Sexual activity: Yes     Partners: Male     Birth control/protection: None   Lifestyle    Physical activity     Days per week: Not on file     Minutes per session: Not on file    Stress: Not on file   Relationships    Social connections     Talks on phone: Not on file     Gets together: Not on file     Attends Sabianism service: Not on file     Active member of club or organization: Not on file     Attends meetings of clubs or organizations: Not on file     Relationship status: Not on file    Intimate partner violence     Fear of current or ex partner: Not on file     Emotionally abused: Not on file     Physically abused: Not on file     Forced sexual activity: Not on file   Other Topics Concern    Not on file   Social History Narrative    Not on file       Family History   Problem Relation Age of Onset    Hypertension Father     Alcohol abuse Father     Breast Cancer Sister     Cancer Brother         Throat versus Thyroid    No Known Problems Son     No Known Problems Son     No Known Problems Daughter        Current Outpatient Medications   Medication Sig    candesartan (ATACAND) 4 mg tablet Take  by mouth daily. 16-12.5mg tablet, once daily    HYDROcodone-acetaminophen (Norco) 5-325 mg per tablet Take 1 Tab by mouth every four (4) hours as needed for Pain for up to 3 days. Max Daily Amount: 6 Tabs.     hydroCHLOROthiazide (HYDRODIURIL) 25 mg tablet Take 1 Tab by mouth daily.  budesonide-formoteroL (SYMBICORT) 160-4.5 mcg/actuation HFAA Take 2 Puffs by inhalation two (2) times a day.  DM/p-ephed/acetaminoph/doxylam (NYQUIL D PO) Take  by mouth. No current facility-administered medications for this visit. Objective: There were no vitals filed for this visit. Vitals measurement not available    Physical Examination:  General: Alert, cooperative, no distress, appears stated age. Obese  Eyes: Conjunctivae clear. Pupils equally round. Extraocular muscles intact. Ears: Normal appearing external ear   Nose: Nares normal appearing  Mouth/Throat: Lips, mucosa, and tongue normal. Moist mucous membranes. No tonsillar enlargement noted. Neck: Supple, symmetrical, trachea midline, no neck mass visualized. Respiratory: Breathing comfortably, in no acute respiratory distress. Cardiovascular: Visualized extremities without edema. Abdomen: Not distended. No bruising  - patient indicated RLQ as location of pain  MSK: Upper extremities normal appearing. Skin: No significant erythematous lesions or discoloration noted on facial skin. No rashes or lesions on exposed skin. Neurologic: No facial asymmetry. Normal gaze. Cranial nerves intact. Psychiatric: Affect appropriate. Mood euthymic. Thoughts logical. Speech volume and speed normal. No hallucinations. Well kempt.        Admission on 04/29/2020, Discharged on 04/29/2020   Component Date Value Ref Range Status    Sodium 04/29/2020 139  136 - 145 mmol/L Final    Potassium 04/29/2020 3.6  3.5 - 5.1 mmol/L Final    Chloride 04/29/2020 107  97 - 108 mmol/L Final    CO2 04/29/2020 26  21 - 32 mmol/L Final    Anion gap 04/29/2020 6  5 - 15 mmol/L Final    Glucose 04/29/2020 143* 65 - 100 mg/dL Final    BUN 04/29/2020 19  6 - 20 MG/DL Final    Creatinine 04/29/2020 1.25* 0.55 - 1.02 MG/DL Final    BUN/Creatinine ratio 04/29/2020 15  12 - 20   Final    GFR est AA 04/29/2020 55* >60 ml/min/1.73m2 Final    GFR est non-AA 04/29/2020 46* >60 ml/min/1.73m2 Final    Comment: Estimated GFR is calculated using the IDMS-traceable Modification of Diet in Renal Disease (MDRD) Study equation, reported for both  Americans (GFRAA) and non- Americans (GFRNA), and normalized to 1.73m2 body surface area. The physician must decide which value applies to the patient. The MDRD study equation should only be used in individuals age 25 or older. It has not been validated for the following: pregnant women, patients with serious comorbid conditions, or on certain medications, or persons with extremes of body size, muscle mass, or nutritional status.  Calcium 04/29/2020 8.6  8.5 - 10.1 MG/DL Final    Bilirubin, total 04/29/2020 0.4  0.2 - 1.0 MG/DL Final    ALT (SGPT) 04/29/2020 32  12 - 78 U/L Final    AST (SGOT) 04/29/2020 20  15 - 37 U/L Final    Alk.  phosphatase 04/29/2020 68  45 - 117 U/L Final    Protein, total 04/29/2020 7.0  6.4 - 8.2 g/dL Final    Albumin 04/29/2020 3.4* 3.5 - 5.0 g/dL Final    Globulin 04/29/2020 3.6  2.0 - 4.0 g/dL Final    A-G Ratio 04/29/2020 0.9* 1.1 - 2.2   Final    WBC 04/29/2020 9.7  3.6 - 11.0 K/uL Final    RBC 04/29/2020 4.47  3.80 - 5.20 M/uL Final    HGB 04/29/2020 14.8  11.5 - 16.0 g/dL Final    HCT 04/29/2020 41.6  35.0 - 47.0 % Final    MCV 04/29/2020 93.1  80.0 - 99.0 FL Final    MCH 04/29/2020 33.1  26.0 - 34.0 PG Final    MCHC 04/29/2020 35.6  30.0 - 36.5 g/dL Final    RDW 04/29/2020 13.5  11.5 - 14.5 % Final    PLATELET 90/58/4602 509  150 - 400 K/uL Final    MPV 04/29/2020 11.0  8.9 - 12.9 FL Final    NRBC 04/29/2020 0.0  0  WBC Final    ABSOLUTE NRBC 04/29/2020 0.00  0.00 - 0.01 K/uL Final    NEUTROPHILS 04/29/2020 68  32 - 75 % Final    LYMPHOCYTES 04/29/2020 23  12 - 49 % Final    MONOCYTES 04/29/2020 6  5 - 13 % Final    EOSINOPHILS 04/29/2020 2  0 - 7 % Final    BASOPHILS 04/29/2020 1  0 - 1 % Final    IMMATURE GRANULOCYTES 04/29/2020 0  0.0 - 0.5 % Final    ABS. NEUTROPHILS 04/29/2020 6.6  1.8 - 8.0 K/UL Final    ABS. LYMPHOCYTES 04/29/2020 2.2  0.8 - 3.5 K/UL Final    ABS. MONOCYTES 04/29/2020 0.6  0.0 - 1.0 K/UL Final    ABS. EOSINOPHILS 04/29/2020 0.2  0.0 - 0.4 K/UL Final    ABS. BASOPHILS 04/29/2020 0.1  0.0 - 0.1 K/UL Final    ABS. IMM. GRANS. 04/29/2020 0.0  0.00 - 0.04 K/UL Final    DF 04/29/2020 AUTOMATED    Final    SMEAR SCANNED    RBC COMMENTS 04/29/2020 NORMOCYTIC, NORMOCHROMIC    Final    Lipase 04/29/2020 151  73 - 393 U/L Final         Assessment/ Plan:   Diagnoses and all orders for this visit:    1. Abdominal wall hematoma, sequela  -     PROTHROMBIN TIME + INR  -     PTT  -     MRI ABD W CONT; Future    2. Abdominal pain, unspecified abdominal location    3. Cyst of left ovary  -     MRI PELV W CONT; Future      Hematoma of abominal wall in setting of COPD and cough. Labs to eval bleeding. Imaging to confirm resolution in 2 weeks. Follow up if pain not improved in this time period. Cyst of left ovary, enlarged over time. MRI pelvis to evaluate. We discussed the expected course, resolution and complications of the diagnosis(es) in detail. Medication risks, benefits, costs, interactions, and alternatives were discussed as indicated. I advised her to contact the office if her condition worsens, changes or fails to improve as anticipated. She expressed understanding with the diagnosis(es) and plan. Rosalind Bailon is a 52 y.o. female being evaluated by a video visit encounter for concerns as above. A caregiver was present when appropriate. Due to this being a TeleHealth encounter (During [facility]-05 public health emergency), evaluation of the following organ systems was limited: Vitals/Constitutional/EENT/Resp/CV/GI//MS/Neuro/Skin/Heme-Lymph-Imm.   Pursuant to the emergency declaration under the [facility], [facility]5 waiver authority and the Coronavirus Preparedness and Response Supplemental Appropriations Act, this Virtual  Visit was conducted, with patient's (and/or legal guardian's) consent, to reduce the patient's risk of exposure to COVID-19 and provide necessary medical care. Services were provided through a video synchronous discussion virtually to substitute for in-person clinic visit. Provider was at home while conducting this encounter. Patient was at home during encounter. Other persons participating in call: None  Consent:  She and/or her healthcare decision maker is aware that this patient-initiated Telehealth encounter is a billable service, with coverage as determined by her insurance carrier. She is aware that she may receive a bill and has provided verbal consent to proceed: Yes  This virtual visit was conducted via [employer]. Pursuant to the emergency declaration under the [facility], 1135 waiver authority and the [employer] Act, this Virtual  Visit was conducted to reduce the patient's risk of exposure to COVID-19 and provide continuity of care for an established patient. Services were provided through a video synchronous discussion virtually to substitute for in-person clinic visit. Due to this being a TeleHealth evaluation, many elements of the physical examination are unable to be assessed. Total Time: minutes: 21-30 minutes. Educated patient on red flag symptoms to warrant return to clinic or emergency room visit. I have discussed the diagnosis with the patient and the intended plan as seen in the above orders. The patient has been offered or received an after-visit summary and questions were answered concerning future plans. I have discussed medication side effects and warnings with the patient as well. Follow-up and Dispositions    · Return if symptoms worsen or fail to improve. Signed,    Abigail Belcher MD  5/1/2020

## 2020-05-14 ENCOUNTER — PATIENT OUTREACH (OUTPATIENT)
Dept: FAMILY MEDICINE CLINIC | Age: 50
End: 2020-05-14

## 2020-06-18 RX ORDER — CANDESARTAN CILEXETIL AND HYDROCHLOROTHIAZIDE 16; 12.5 MG/1; MG/1
TABLET ORAL
Qty: 30 TAB | Refills: 0 | Status: SHIPPED | OUTPATIENT
Start: 2020-06-18 | End: 2020-07-23

## 2020-07-16 ENCOUNTER — HOSPITAL ENCOUNTER (EMERGENCY)
Age: 50
Discharge: HOME | End: 2020-07-16
Attending: EMERGENCY MEDICINE
Payer: MEDICAID

## 2020-07-16 ENCOUNTER — APPOINTMENT (OUTPATIENT)
Dept: GENERAL RADIOLOGY | Age: 50
End: 2020-07-16
Attending: EMERGENCY MEDICINE
Payer: MEDICAID

## 2020-07-16 VITALS
OXYGEN SATURATION: 98 % | WEIGHT: 155 LBS | TEMPERATURE: 97.9 F | DIASTOLIC BLOOD PRESSURE: 92 MMHG | BODY MASS INDEX: 24.33 KG/M2 | HEART RATE: 74 BPM | HEIGHT: 67 IN | SYSTOLIC BLOOD PRESSURE: 141 MMHG | RESPIRATION RATE: 16 BRPM

## 2020-07-16 DIAGNOSIS — S91.012A LACERATION OF LEFT ANKLE, INITIAL ENCOUNTER: Primary | ICD-10-CM

## 2020-07-16 PROCEDURE — 77030038269 HC DRN EXT URIN PURWCK BARD -A

## 2020-07-16 PROCEDURE — 74011000250 HC RX REV CODE- 250: Performed by: EMERGENCY MEDICINE

## 2020-07-16 PROCEDURE — 90471 IMMUNIZATION ADMIN: CPT

## 2020-07-16 PROCEDURE — 74011250637 HC RX REV CODE- 250/637: Performed by: EMERGENCY MEDICINE

## 2020-07-16 PROCEDURE — 90715 TDAP VACCINE 7 YRS/> IM: CPT | Performed by: EMERGENCY MEDICINE

## 2020-07-16 PROCEDURE — 99283 EMERGENCY DEPT VISIT LOW MDM: CPT

## 2020-07-16 PROCEDURE — 75810000293 HC SIMP/SUPERF WND  RPR

## 2020-07-16 PROCEDURE — 74011250636 HC RX REV CODE- 250/636: Performed by: EMERGENCY MEDICINE

## 2020-07-16 PROCEDURE — 73610 X-RAY EXAM OF ANKLE: CPT

## 2020-07-16 RX ORDER — NAPROXEN 250 MG/1
500 TABLET ORAL
Status: COMPLETED | OUTPATIENT
Start: 2020-07-16 | End: 2020-07-16

## 2020-07-16 RX ORDER — NAPROXEN 500 MG/1
500 TABLET ORAL
Qty: 20 TAB | Refills: 0 | Status: SHIPPED | OUTPATIENT
Start: 2020-07-16 | End: 2021-07-13 | Stop reason: ALTCHOICE

## 2020-07-16 RX ORDER — LIDOCAINE HYDROCHLORIDE AND EPINEPHRINE 20; 10 MG/ML; UG/ML
1.5 INJECTION, SOLUTION INFILTRATION; PERINEURAL ONCE
Status: COMPLETED | OUTPATIENT
Start: 2020-07-16 | End: 2020-07-16

## 2020-07-16 RX ADMIN — NAPROXEN 500 MG: 250 TABLET ORAL at 15:10

## 2020-07-16 RX ADMIN — TETANUS TOXOID, REDUCED DIPHTHERIA TOXOID AND ACELLULAR PERTUSSIS VACCINE, ADSORBED 0.5 ML: 5; 2.5; 8; 8; 2.5 SUSPENSION INTRAMUSCULAR at 15:12

## 2020-07-16 RX ADMIN — LIDOCAINE HYDROCHLORIDE,EPINEPHRINE BITARTRATE 30 MG: 20; .01 INJECTION, SOLUTION INFILTRATION; PERINEURAL at 15:10

## 2020-07-16 NOTE — ED NOTES
Medicated pt per orders. PA at bedside for sutures. Pt discharged by PA. Pt provided with discharge instructions Rx and instructions on follow up care. Pt out of ED ambulatory without difficulty accompanied by family.

## 2020-07-16 NOTE — DISCHARGE INSTRUCTIONS
Please follow up with your Primary care doctor, urgent care, or ED for suture removal in 7-10 days. After the sutures are removed, please place sunscreen on the area for next few months to prevent scarring. Please keep wound clean and dry for next 24 hours. After 24 hours can get the wound wet but keep clean. Please return to ER for signs of infection including redness around the site, pus drainage, fevers, worsening swelling despite pain medications and ice packs.

## 2020-07-16 NOTE — ED PROVIDER NOTES
EMERGENCY DEPARTMENT HISTORY AND PHYSICAL EXAM      Date: 7/16/2020  Patient Name: Kailee Harrington  Patient Age and Sex: 52 y.o. female     History of Presenting Illness     Chief Complaint   Patient presents with    Laceration     Pt arrived to ED from home with L foot pain and laceration to L foot, states piece of steel fell on foot. Pt needs tetanus. History Provided By: Patient    HPI: Kailee Harrington is a 29-year-old female presenting with laceration to her left ankle. Patient states that she was outside when she tripped on an extension cord around a steel pipe in the steel pipe fell hitting her on the left ankle. States that she has a laceration to the inside of her left ankle which was actively bleeding. Her  put compression dressing on it as well as ice to help with the pain and swelling. Patient states that she is not on any blood thinners and has not taken anything for the pain. Tetanus shot was more than 10 years ago. No history of diabetes. There are no other complaints, changes, or physical findings at this time. PCP: Nacho Valente NP    No current facility-administered medications on file prior to encounter. Current Outpatient Medications on File Prior to Encounter   Medication Sig Dispense Refill    candesartan-hydroCHLOROthiazide (ATACAND HCT) 16-12.5 mg per tablet TAKE ONE TABLET BY MOUTH DAILY 30 Tab 0    budesonide-formoteroL (SYMBICORT) 160-4.5 mcg/actuation HFAA Take 2 Puffs by inhalation two (2) times a day. 1 Inhaler 4    [DISCONTINUED] candesartan (ATACAND) 4 mg tablet Take  by mouth daily. 16-12.5mg tablet, once daily      [DISCONTINUED] hydroCHLOROthiazide (HYDRODIURIL) 25 mg tablet Take 1 Tab by mouth daily.  30 Tab 3       Past History     Past Medical History:  Past Medical History:   Diagnosis Date    Anemia 2/18/2012    [facility] [address]     Essential hypertension     Hypertension     SVT [facility]supraventricular tachycardia) ([facility] [address] 2018       Past Surgical History:  Past Surgical History:   Procedure Laterality Date    HX CHOLECYSTECTOMY      HX GYN             Family History:  Family History   Problem Relation Age of Onset    Hypertension Father     Alcohol abuse Father     Breast Cancer Sister     Cancer Brother         Throat versus Thyroid    No Known Problems Son     No Known Problems Son     No Known Problems Daughter        Social History:  Social History     Tobacco Use    Smoking status: Current Every Day Smoker     Packs/day: 1.00    Smokeless tobacco: Never Used   Substance Use Topics    Alcohol use: Not Currently     Alcohol/week: 1.0 - 2.0 standard drinks     Types: 1 - 2 Cans of beer per week     Comment: a week    Drug use: Yes     Frequency: 1.0 times per week     Types: Cocaine     Comment: Pt stated she used within the past week       Allergies: Allergies   Allergen Reactions    Hydralazine Unknown (comments)     jitteriness    Hydromorphone Itching     morphine    Morphine Itching    Sulfa (Sulfonamide Antibiotics) Nausea and Vomiting         Review of Systems   Review of Systems   Constitutional: Negative for chills and fever. Respiratory: Negative for cough and shortness of breath. Cardiovascular: Negative for chest pain. Gastrointestinal: Negative for abdominal pain, constipation, diarrhea, nausea and vomiting. Genitourinary: Negative for dysuria, frequency and hematuria. Musculoskeletal: Positive for arthralgias. Skin: Positive for wound. Neurological: Negative for weakness and numbness. All other systems reviewed and are negative. Physical Exam   Physical Exam  Constitutional:       General: She is not in acute distress. Appearance: She is well-developed. HENT:      Head: Normocephalic and atraumatic. Neck:      Musculoskeletal: Normal range of motion. Cardiovascular:      Comments:  Well perfused  Pulmonary:      Effort: Pulmonary effort is normal. No respiratory distress. Musculoskeletal: Normal range of motion. Comments: Patient has 2+ DP pulse on the left foot and good capillary refill. Able to wiggle her toes fine. Pain is elicited when moving her ankle secondary to the wound. Has a 4 cm linear laceration over the left medial malleolus. No obvious deformity noted no foreign bodies noted   Neurological:      General: No focal deficit present. Mental Status: She is alert and oriented to person, place, and time. Psychiatric:         Mood and Affect: Mood normal.          Diagnostic Study Results     Labs -   No results found for this or any previous visit (from the past 12 hour(s)). Radiologic Studies -   XR ANKLE LT MIN 3 V   Final Result   IMPRESSION:       No fracture or radiodense foreign body. CT Results  (Last 48 hours)    None        CXR Results  (Last 48 hours)    None            Medical Decision Making   I am the first provider for this patient. I reviewed the vital signs, available nursing notes, past medical history, past surgical history, family history and social history. Vital Signs-Reviewed the patient's vital signs. Patient Vitals for the past 12 hrs:   Temp Pulse Resp BP SpO2   07/16/20 1338 97.9 °F (36.6 °C) 74 16 (!) 141/92 98 %       Records Reviewed: Nursing Notes and Old Medical Records    Provider Notes (Medical Decision Making):   Patient presents with laceration. DDx: simple laceration vs complex laceration (open fracture, foreign body retention). The wound has been explored well without foreign bodies noted and closed appropriately under sterile technique. Laboratory tests, medications, x-rays, diagnosis, follow up plan and return instructions have been reviewed and discussed with the patient. The patienthas had the opportunity to ask questions about their care. The patient expresses understanding and agreement with diagnosis, follow up and return instructions.   The patient agrees to return for suture removal in the discussed time period and expresses understanding that leaving sutures in place for longer periods will lead to scarring and infection. The patient expresses understanding that although appropriate care was taken in wound management that scarring and infection can still occur. Tetanus has been updated if necessary. Procedure Note - Laceration Repair:  3:45 PM  Procedure by NIKI Oliver. Complexity: simple  4 cm linear laceration to left ankle was irrigated copiously with NS under jet lavage, prepped with Chlorprep and draped in a sterile fashion. The area was anesthetized with 4 mLs of  Lidocaine 1% with epinephrine via local infiltration. The wound was explored with the following results: No foreign bodies found, No tendon laceration seen. The wound was repaired with One layer suture closure: Skin Layer:  6 sutures placed, stitch type:simple interrupted, suture: 4-0 nylon. .  The wound was closed with good hemostasis and approximation. Sterile dressing applied. Estimated blood loss: minimal  The procedure took 1-15 minutes, and pt tolerated well. Procedure Note - Laceration Repair:  3:45 PM  Procedure by NIKI Oliver. Complexity: simple  1 cm linear laceration to left ankle was irrigated copiously with NS under jet lavage, prepped with Chlorprep and draped in a sterile fashion. The area was anesthetized with 2 mLs of  Lidocaine 1% with epinephrine via local infiltration. The wound was explored with the following results: No foreign bodies found, No tendon laceration seen. The wound was repaired with One layer suture closure: Skin Layer:  2 sutures placed, stitch type:simple interrupted, suture: 4-0 nylon. .  The wound was closed with good hemostasis and approximation. Sterile dressing applied. Estimated blood loss: minimal  The procedure took 1-15 minutes, and pt tolerated well. ED Course:   Initial assessment performed.  The patients presenting problems have been discussed, and they are in agreement with the care plan formulated and outlined with them. I have encouraged them to ask questions as they arise throughout their visit. Critical Care Time:   0    Disposition:  Discharge Note:  The patient has been re-evaluated and is ready for discharge. Reviewed available results with patient. Counseled patient on diagnosis and care plan. Patient has expressed understanding, and all questions have been answered. Patient agrees with plan and agrees to follow up as recommended, or to return to the ED if their symptoms worsen. Discharge instructions have been provided and explained to the patient, along with reasons to return to the ED. PLAN:  Discharge Medication List as of 7/16/2020  4:25 PM      START taking these medications    Details   naproxen (NAPROSYN) 500 mg tablet Take 1 Tab by mouth every twelve (12) hours as needed for Pain., Normal, Disp-20 Tab,R-0         CONTINUE these medications which have NOT CHANGED    Details   candesartan-hydroCHLOROthiazide (ATACAND HCT) 16-12.5 mg per tablet TAKE ONE TABLET BY MOUTH DAILY, Normal, Disp-30 Tab,R-0      budesonide-formoteroL (SYMBICORT) 160-4.5 mcg/actuation HFAA Take 2 Puffs by inhalation two (2) times a day., Normal, Disp-1 Inhaler, R-4           2. Follow-up Information     Follow up With Specialties Details Why Contact Info    [facility] EMERGENCY DEPT Emergency Medicine In 1 week For suture removal [address] [place] [address]  [phone]        3. Return to ED if worse     Diagnosis     Clinical Impression:   1. Laceration of left ankle, initial encounter        Attestations:    Katey Adair M.D. Please note that this dictation was completed with [employer], the [employer] voice recognition software. Quite often unanticipated grammatical, syntax, homophones, and other interpretive errors are inadvertently transcribed by the computer software.   Please disregard these errors. Please excuse any errors that have escaped final proofreading. Thank you.

## 2020-07-22 ENCOUNTER — VIRTUAL VISIT (OUTPATIENT)
Dept: FAMILY MEDICINE CLINIC | Age: 50
End: 2020-07-22

## 2020-07-22 DIAGNOSIS — L08.9 SKIN INFECTION: Primary | ICD-10-CM

## 2020-07-22 DIAGNOSIS — L03.116 CELLULITIS OF LEFT LOWER EXTREMITY: ICD-10-CM

## 2020-07-22 DIAGNOSIS — T81.49XA: ICD-10-CM

## 2020-07-22 RX ORDER — AMOXICILLIN AND CLAVULANATE POTASSIUM 875; 125 MG/1; MG/1
1 TABLET, FILM COATED ORAL 2 TIMES DAILY
Qty: 20 TAB | Refills: 0 | Status: SHIPPED | OUTPATIENT
Start: 2020-07-22 | End: 2020-08-01

## 2020-07-22 NOTE — PROGRESS NOTES
Identified pt with two pt identifiers(name and ). Reviewed record in preparation for visit and have obtained necessary documentation. Chief Complaint   Patient presents with    Foot Injury     Pt states that last 20, she was in her yard when a piece of steel fell on L foot and cut her in 2 places; [id number] and [facility]; pt c/o redness, swelling, drainage at wound sites; Pt states she was not rx'd an antibiotic in ED; she reports current pain level 6/10        Health Maintenance Due   Topic    PAP AKA CERVICAL CYTOLOGY     Breast Cancer Screen Mammogram        Coordination of Care Questionnaire:  :   1) Have you been to an emergency room, urgent care, or hospitalized since your last visit? If yes, where when, and reason for visit? yes   See today's reason for visit    2. Have seen or consulted any other health care provider since your last visit? If yes, where when, and reason for visit? NO      Patient is accompanied by self I have received verbal consent from John Washington to discuss any/all medical information while they are present in the room.

## 2020-07-22 NOTE — PATIENT INSTRUCTIONS
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
## 2020-12-14 RX ORDER — CANDESARTAN CILEXETIL AND HYDROCHLOROTHIAZIDE 16; 12.5 MG/1; MG/1
TABLET ORAL
Qty: 30 TAB | Refills: 2 | Status: SHIPPED | OUTPATIENT
Start: 2020-12-14 | End: 2021-07-13 | Stop reason: SDUPTHER

## 2021-01-14 ENCOUNTER — PATIENT MESSAGE (OUTPATIENT)
Dept: FAMILY MEDICINE CLINIC | Age: 51
End: 2021-01-14

## 2021-07-13 ENCOUNTER — VIRTUAL VISIT (OUTPATIENT)
Dept: FAMILY MEDICINE CLINIC | Age: 51
End: 2021-07-13
Payer: COMMERCIAL

## 2021-07-13 ENCOUNTER — TELEPHONE (OUTPATIENT)
Dept: FAMILY MEDICINE CLINIC | Age: 51
End: 2021-07-13

## 2021-07-13 DIAGNOSIS — J04.0 ACUTE LARYNGITIS: Primary | ICD-10-CM

## 2021-07-13 DIAGNOSIS — Z72.0 TOBACCO ABUSE: ICD-10-CM

## 2021-07-13 DIAGNOSIS — I10 BENIGN ESSENTIAL HYPERTENSION: ICD-10-CM

## 2021-07-13 DIAGNOSIS — J43.1 PANLOBULAR EMPHYSEMA (HCC): ICD-10-CM

## 2021-07-13 DIAGNOSIS — J40 BRONCHITIS: ICD-10-CM

## 2021-07-13 PROCEDURE — 99213 OFFICE O/P EST LOW 20 MIN: CPT | Performed by: FAMILY MEDICINE

## 2021-07-13 RX ORDER — CANDESARTAN CILEXETIL AND HYDROCHLOROTHIAZIDE 16; 12.5 MG/1; MG/1
TABLET ORAL
Qty: 30 TABLET | Refills: 0 | Status: SHIPPED | OUTPATIENT
Start: 2021-07-13 | End: 2021-09-07 | Stop reason: SDUPTHER

## 2021-07-13 RX ORDER — AZITHROMYCIN 250 MG/1
TABLET, FILM COATED ORAL
Qty: 6 TABLET | Refills: 0 | Status: SHIPPED | OUTPATIENT
Start: 2021-07-13 | End: 2021-07-18

## 2021-07-13 NOTE — PROGRESS NOTES
VIRTUAL VISIT    Patient seen via virtual visit today for the following:  Chief Complaint   Patient presents with    Laryngitis     x 4 days    Cough    Sore Throat    Medication Refill     BP medication       HISTORY OF PRESENT ILLNESS   HPI  Usual patient of [facility] w/ history of Tobacco Abuse, Emphysematous COPD, Hypertension presents via virtual visit w/ complaints of 4 day h/o laryngitis, sore throat and coughing. Had no voice at all x 3 days. Today is the first day she has been able to talk since onset 4 days ago. Throat is sore and raw. No white exudate. Glands feel swollen and inflamed but that is alittle better today after taking 2 Aleve yesterday. Hurts to swallow and eat but eating ok now. No dysphagia or choking. Getting a lot of thick, heavy post nasal drainage that she cant clear from her throat well. Hacking wet cough but nonproductive  Mild nasal congestion and some thick discolored nasal discharge when she sneezes. No sinus pain/pressure but has no sense of taste or smell  No fevers, chills, sweats  No chest congestion  No sob or wheezing  Has been taking Guafenesin prn but stopped because it seemed to be making it worse  She has been complying w/ her Symbicort each day    Smoker   Currently not working  No recent travel  No recent ill contacts  Has not had Covid Vaccines and declines Covid testing    She is overdue follow up hypertension and labs but only has 5 pills left of her BP medication w/ no refills. Requesting refill until she can follow up w/ PCP. REVIEW OF SYMPTOMS   Review of Systems   Constitutional: Negative for chills and fever. HENT: Positive for congestion and sore throat. Negative for ear pain. Eyes: Negative. Respiratory: Positive for cough. Negative for hemoptysis, shortness of breath and wheezing. Cardiovascular: Negative for chest pain. Gastrointestinal: Negative. Neurological: Negative.             PROBLEM LIST/MEDICAL HISTORY Problem List  Date Reviewed: 2021        Codes Class Noted    Cocaine abuse in remission [facility]) ICD-10-CM: F14.11  ICD-9-CM: 305.63  2019        Tobacco abuse ICD-10-CM: Z72.0  ICD-9-CM: 305.1  2019        SVT (supraventricular tachycardia) ([facility] ICD-10-CM: I47.1  ICD-9-CM: 427.89  2018        COPD (chronic obstructive pulmonary disease) ([facility] ICD-10-CM: J44.9  ICD-9-CM: 496  2016        Essential hypertension ICD-10-CM: I10  ICD-9-CM: 401.9  2016        Anxiety ICD-10-CM: F41.9  ICD-9-CM: 300.00  2012        Anemia ICD-10-CM: D64.9  ICD-9-CM: 285.9  2012                  PAST SURGICAL HISTORY     Past Surgical History:   Procedure Laterality Date    HX  SECTION      HX CHOLECYSTECTOMY           MEDICATIONS     Current Outpatient Medications   Medication Sig    candesartan-hydroCHLOROthiazide (ATACAND HCT) 16-12.5 mg per tablet TAKE ONE TABLET BY MOUTH DAILY    Symbicort 160-4.5 mcg/actuation HFAA INHALE TWO PUFFS BY MOUTH TWICE A DAY     No current facility-administered medications for this visit.           ALLERGIES     Allergies   Allergen Reactions    Hydromorphone Itching     morphine    Morphine Itching    Sulfa (Sulfonamide Antibiotics) Nausea and Vomiting    Hydralazine Rash and Unknown (comments)     jitteriness            SOCIAL HISTORY     Social History     Tobacco Use    Smoking status: Current Every Day Smoker     Packs/day: 1.00    Smokeless tobacco: Never Used   Substance Use Topics    Alcohol use: Not Currently     Alcohol/week: 1.0 - 2.0 standard drinks     Types: 1 - 2 Cans of beer per week     Comment: a week     Social History     Social History Narrative    Unemployed     Social History     Substance and Sexual Activity   Sexual Activity Yes    Partners: Male    Birth control/protection: None       IMMUNIZATIONS     Immunization History   Administered Date(s) Administered    Influenza Vaccine (Quad) PF (>6 Mo Flulaval, Fluarix, and >3 Sue [place] [id number]) 11/17/2018    Rhogam Injection 02/17/2012    TDAP Vaccine 08/11/2012    Tdap 07/16/2020         FAMILY HISTORY     Family History   Problem Relation Age of Onset    Hypertension Father     Alcohol abuse Father     Breast Cancer Sister     Cancer Brother         Throat versus Thyroid    No Known Problems Son     No Known Problems Son     No Known Problems Daughter          VITALS   There were no vitals taken for this virtual visit. PHYSICAL EXAMINATION   Physical Exam  Constitutional:       General: She is not in acute distress. Pulmonary:      Effort: Pulmonary effort is normal. No respiratory distress. Comments: Deep barking wet cough. Voice is raspy. Neurological:      Mental Status: She is alert. LABORATORY DATA/ANCILLARY/IMAGING     Results for orders placed or performed during the hospital encounter of 64/70/42   METABOLIC PANEL, COMPREHENSIVE   Result Value Ref Range    Sodium 139 136 - 145 mmol/L    Potassium 3.6 3.5 - 5.1 mmol/L    Chloride 107 97 - 108 mmol/L    CO2 26 21 - 32 mmol/L    Anion gap 6 5 - 15 mmol/L    Glucose 143 (H) 65 - 100 mg/dL    BUN 19 6 - 20 MG/DL    Creatinine 1.25 (H) 0.55 - 1.02 MG/DL    BUN/Creatinine ratio 15 12 - 20      GFR est AA 55 (L) >60 ml/min/1.73m2    GFR est non-AA 46 (L) >60 ml/min/1.73m2    Calcium 8.6 8.5 - 10.1 MG/DL    Bilirubin, total 0.4 0.2 - 1.0 MG/DL    ALT (SGPT) 32 12 - 78 U/L    AST (SGOT) 20 15 - 37 U/L    Alk.  phosphatase 68 45 - 117 U/L    Protein, total 7.0 6.4 - 8.2 g/dL    Albumin 3.4 (L) 3.5 - 5.0 g/dL    Globulin 3.6 2.0 - 4.0 g/dL    A-G Ratio 0.9 (L) 1.1 - 2.2     CBC WITH AUTOMATED DIFF   Result Value Ref Range    WBC 9.7 3.6 - 11.0 K/uL    RBC 4.47 3.80 - 5.20 M/uL    HGB 14.8 11.5 - 16.0 g/dL    HCT 41.6 35.0 - 47.0 %    MCV 93.1 80.0 - 99.0 FL    MCH 33.1 26.0 - 34.0 PG    MCHC 35.6 30.0 - 36.5 g/dL    RDW 13.5 11.5 - 14.5 %    PLATELET 707 646 - 130 K/uL    MPV 11.0 8.9 - 12.9 FL    NRBC 0.0 0  WBC    ABSOLUTE NRBC 0.00 0.00 - 0.01 K/uL    NEUTROPHILS 68 32 - 75 %    LYMPHOCYTES 23 12 - 49 %    MONOCYTES 6 5 - 13 %    EOSINOPHILS 2 0 - 7 %    BASOPHILS 1 0 - 1 %    IMMATURE GRANULOCYTES 0 0.0 - 0.5 %    ABS. NEUTROPHILS 6.6 1.8 - 8.0 K/UL    ABS. LYMPHOCYTES 2.2 0.8 - 3.5 K/UL    ABS. MONOCYTES 0.6 0.0 - 1.0 K/UL    ABS. EOSINOPHILS 0.2 0.0 - 0.4 K/UL    ABS. BASOPHILS 0.1 0.0 - 0.1 K/UL    ABS. IMM. GRANS. 0.0 0.00 - 0.04 K/UL    DF AUTOMATED      RBC COMMENTS NORMOCYTIC, NORMOCHROMIC     LIPASE   Result Value Ref Range    Lipase 151 73 - 393 U/L            ASSESSMENT & PLAN       ICD-10-CM ICD-9-CM    1. Acute laryngitis  J04.0 464.00 azithromycin (ZITHROMAX) 250 mg tablet   2. Bronchitis  J40 490 azithromycin (ZITHROMAX) 250 mg tablet   3. Panlobular emphysema (HCC)  J43.1 492.8    4. Tobacco abuse  Z72.0 305.1    5. Benign essential hypertension  I10 401.1 candesartan-hydroCHLOROthiazide (ATACAND HCT) 16-12.5 mg per tablet     Zpack as directed  Rest voice  Push fluids  Warm salt water gargles  Saline sinus rinses 1-2 x a day  Smoking cessation  Continue Symbicort and take as prescribed 2 puffs BID  Renewed BP medication x 1 month for now. Past due follow up w/ PCP and overdue routine labs. Patient calling to schedule follow up appointment w/ PCP after acute illness resolved  Call back if symptoms persist beyond expectant course, sooner if anything worsens in the interim   ER for any acute changes, severe symptoms or other concerning changes    ---------------------------------------------------------------------------------------------------------------  Patient is being evaluated by a virtual/ video visit encounter to address concerns as noted above. Patient identification was verified at the start of the visit: YES  A caregiver was present when appropriate.  Due to this being a TeleHealth encounter (During [facility]-16 public health emergency), evaluation of the following organ systems was limited: Vitals/Constitutional/EENT/Resp/CV/GI//MS/Neuro/Skin/Heme-Lymph-Imm. Pursuant to the emergency declaration under the [address] [place] [address], [facility] waiver authority and the [employer] Act, this Virtual  Visit was conducted, with patient's (and/or legal guardian's) consent, to reduce the patient's risk of exposure to COVID-19 and provide necessary medical care. Services were provided through a video synchronous discussion virtually to substitute for in-person clinic visit. The patient (and/or legal guardian) has also been advised to contact this office for worsening conditions or problems, and seek emergency medical treatment and/or call 911 if deemed necessary. Patient was located at their own home. I was in the office while conducting this encounter. Consent:  She and/or her healthcare decision maker is aware that this patient-initiated Telehealth encounter is a billable service, with coverage as determined by her insurance carrier. She is aware that she may receive a bill and has provided verbal consent to proceed: Yes  This virtual visit was conducted via [employer]. me. Pursuant to the emergency declaration under the [address] [place] [address], [facility] authority and the [employer] Act, this Virtual  Visit was conducted to reduce the patient's risk of exposure to COVID-19 and provide continuity of care for an established patient. Services were provided through a video synchronous discussion virtually to substitute for in-person clinic visit. Due to this being a TeleHealth evaluation, many elements of the physical examination are unable to be assessed. Total Time: minutes: 20. An electronic signature was used to authenticate this note.   ~Mylene Washington MD~

## 2021-07-13 NOTE — PROGRESS NOTES
3-4 day h/o hoarseness  Had no voice at all x 3 days  Today is the first day she has been able to talk since onset 4 days ago  Throat sore and glands feel swollen and inflamed but that is alittle better today  Hurt to swallow and eat but eating ok now  No white exudate  Thick, heavy post nasal drainage  Hacking cough on and off but nonproductive  Mild nasal congestion and some thick discolored nasal discharge  No sinus pain/pressure  No fevers, chills, sweats  No chest congestion  No sob or wheezing  Took 2 Aleve yesterday which seemed to help some  Has been taking Guafenesin prn but stopped because it seemed to be making it worse  No sense of taste or smell    Smoker   Currently not working  No recent travel  No recent ill contacts  Has not had Covid Vaccines    Refill BP med

## 2021-07-13 NOTE — TELEPHONE ENCOUNTER
Pt called stated pharmacy  needs pre auth.     candesartan-hydroCHLOROthiazide (ATACAND HCT) 16-12.5 mg per tablet

## 2021-07-14 NOTE — TELEPHONE ENCOUNTER
Chief Complaint   Patient presents with    Prior Auth     candesartan-hydroCHLOROthiazide (ATACAND HCT) 16-12.5 mg per tablet

## 2021-07-14 NOTE — TELEPHONE ENCOUNTER
Pt wants to know if there is something else she can take while waiting for the PA to be done, pt stated the cough wasn't that bad during the VV but today it's really bad       BCB# [phone]

## 2021-07-15 ENCOUNTER — TELEPHONE (OUTPATIENT)
Dept: FAMILY MEDICINE CLINIC | Age: 51
End: 2021-07-15

## 2021-07-15 NOTE — TELEPHONE ENCOUNTER
Pt was seen the other day by FE is still sick antibiotic finished, PA was completed for other meds, but is requesting to know if she should be taking another medication to help she is still congested with all the other symptoms. Please advise.

## 2021-07-16 NOTE — TELEPHONE ENCOUNTER
Recommend patient go be seen at Urgent Care for further evaluation and management at this time   Lm to go to urgent care

## 2021-07-28 NOTE — TELEPHONE ENCOUNTER
In reference to the cough symptoms: Patient was prescribed an antibiotic for symptoms . Patient stated cough is not better, it was reviewed by Dr. James Mcfarland and patient was informed to go to urgent care for further evaluation.   Uziel Doe LPN

## 2021-08-02 ENCOUNTER — TELEPHONE (OUTPATIENT)
Dept: FAMILY MEDICINE CLINIC | Age: 51
End: 2021-08-02

## 2021-08-02 NOTE — TELEPHONE ENCOUNTER
----- Message from Hoda Hill sent at 8/2/2021 12:42 PM EDT -----  Regarding: LINA Valente/Telephone  General Message/Vendor Calls    Caller's first and last name:Vickie Samano      Reason for call:needs meds refilled for blood pressure and a appointment for coughing and sneezing the antibiotic prescribed last month is not working       Callback required yes/no and why:yes      Best contact number(s):  [phone]    Details to clarify the request: patient needs a refill for her blood pressure and an appointment for coughing and sneezing      Hoda Hill

## 2021-08-02 NOTE — TELEPHONE ENCOUNTER
Dionisio Stockton (Self) [phone] (H)     Called Pt back to get more information. Pt stated that she had a Virtual appointment and they gave her a Z pack on and took amoxicillin (most likely ) and over-the-cpunter medication and nothing is working. Pt would like an in-office appointment to have the NP look at her throat. Also, she needs a refill on the BP medication that was issued on  because it needed a prior auth. Please advise if we can see Pt in-office.

## 2021-08-03 NOTE — TELEPHONE ENCOUNTER
----- Message from Joycelyn Joy sent at 8/3/2021  8:57 AM EDT -----  Regarding: LINA Valente/telephone  Contact: [phone]  Patient return call    Caller's first and last name and relationship (if not the patient):      Best contact number(s):[phone]      Whose call is being returned:not sure      Details to clarify the request:      Joycelyn Joy      im unsure of who called her. I see the encounter was sent to Ambrosio so I wasn't sure if she tried reaching out to her?

## 2021-08-04 NOTE — TELEPHONE ENCOUNTER
----- Message from Pedro De Luna sent at 8/3/2021  8:57 AM EDT -----  Regarding: LINA Valente/telephone  Contact: [phone]  Patient return call    Caller's first and last name and relationship (if not the patient):      Best contact number(s):[phone]      Whose call is being returned:not sure      Details to clarify the request:      Pedro De Luna

## 2021-08-09 NOTE — TELEPHONE ENCOUNTER
Patient was prescribed an antibiotic for symptoms . Patient stated cough is not better, it was reviewed by Dr. Mayra Milton and patient was informed to go to urgent care for further evaluation.   Vivian Melo LPN

## 2021-09-07 DIAGNOSIS — I10 BENIGN ESSENTIAL HYPERTENSION: ICD-10-CM

## 2021-09-07 NOTE — TELEPHONE ENCOUNTER
Pt would like a refill of her BP medication. Pt stated the medication required a prior auth and it was not filled out correctly for the insurance company. Pt would like the medication filled and a call back from the nurse once approved.

## 2021-09-07 NOTE — TELEPHONE ENCOUNTER
Chief Complaint   Patient presents with   Pj Dotson Appointment     Patient requesting another refill for medication. An appointment for follow up care is needed. Prior authorization will be completed once appointment is scheduled.   James Ma LPN

## 2021-09-08 ENCOUNTER — DOCUMENTATION ONLY (OUTPATIENT)
Dept: FAMILY MEDICINE CLINIC | Age: 51
End: 2021-09-08

## 2021-09-10 RX ORDER — CANDESARTAN CILEXETIL AND HYDROCHLOROTHIAZIDE 16; 12.5 MG/1; MG/1
TABLET ORAL
Qty: 30 TABLET | Refills: 0 | Status: SHIPPED | OUTPATIENT
Start: 2021-09-10 | End: 2021-10-25

## 2021-09-29 NOTE — PROGRESS NOTES
Frantz Guerrero is a 41 y.o. female and presents with Depression  . Subjective:  Depression Review:  Patient is seen for followup of depression. Ongoing depressed mood, hypersomnia, psychomotor agitation, psychomotor retardation, feelings of worthlessness/guilt, difficulty concentrating and hopelessness,she has had crying spells and mood changes. Treatment includes Xanax and no other therapies. She denies recurrent thoughts of death and suicidal thoughts without plan. She experiences the following side effects from the treatment: none. Review of Systems  Constitutional: negative for fevers, chills, anorexia and weight loss  Eyes:   negative for visual disturbance and irritation  ENT:   negative for tinnitus,sore throat,nasal congestion,ear pains. hoarseness  Respiratory:  negative for cough, hemoptysis, dyspnea,wheezing  CV:   negative for chest pain, palpitations, lower extremity edema  GI:   negative for nausea, vomiting, diarrhea, abdominal pain,melena  Endo:               negative for polyuria,polydipsia,polyphagia,heat intolerance  Genitourinary: negative for frequency, dysuria and hematuria  Integument:  negative for rash and pruritus  Hematologic:  negative for easy bruising and gum/nose bleeding  Musculoskel: negative for myalgias, arthralgias, back pain, muscle weakness, joint pain  Neurological:  negative for headaches, dizziness, vertigo, memory problems and gait   Behavl/Psych:feelings of anxiety, depression, mood changes    Past Medical History:   Diagnosis Date    Anemia 2012    Essential hypertension     HX OTHER MEDICAL     2009 baby put up for adoption, tested + for cocaine with that pregnancy    Hypertension      Past Surgical History:   Procedure Laterality Date    HX CHOLECYSTECTOMY      HX GYN           Social History     Social History    Marital status: LEGALLY      Spouse name: N/A    Number of children: N/A    Years of education: N/A     Social History Main Topics    Smoking status: Current Every Day Smoker     Packs/day: 0.50    Smokeless tobacco: Never Used    Alcohol use Yes    Drug use: No    Sexual activity: Yes     Birth control/ protection: None     Other Topics Concern    None     Social History Narrative     Family History   Problem Relation Age of Onset    Hypertension Father      Current Outpatient Prescriptions   Medication Sig Dispense Refill    ALPRAZolam (XANAX) 0.5 mg tablet Take 1 Tab by mouth two (2) times a day. Max Daily Amount: 1 mg. 60 Tab 1    candesartan (ATACAND) 32 mg tablet Take 1 Tab by mouth daily. 30 Tab 3    chlorthalidone (HYGROTEN) 25 mg tablet Take 1 Tab by mouth daily. 30 Tab 3    amLODIPine (NORVASC) 5 mg tablet Take 1 Tab by mouth daily. 30 Tab 3    budesonide-formoterol (SYMBICORT) 160-4.5 mcg/actuation HFAA Take 2 Puffs by inhalation two (2) times a day. 1 Inhaler 4    albuterol (PROVENTIL HFA, VENTOLIN HFA, PROAIR HFA) 90 mcg/actuation inhaler Take 1 Puff by inhalation every six (6) hours as needed for Wheezing or Shortness of Breath (cough). 1 Inhaler 3    albuterol-ipratropium (DUO-NEB) 2.5 mg-0.5 mg/3 ml nebu 3 mL by Nebulization route every six (6) hours as needed.  50 mL 0     Allergies   Allergen Reactions    Hydralazine Unknown (comments)     jitteriness    Hydromorphone Itching     morphine    Morphine Itching    Sulfa (Sulfonamide Antibiotics) Nausea and Vomiting       Objective:  Visit Vitals    /70 (BP 1 Location: Right arm, BP Patient Position: Sitting)    Pulse 77    Temp 98.3 °F (36.8 °C) (Oral)    Resp 17    Ht 5' 7\" (1.702 m)    Wt 167 lb (75.8 kg)    LMP 11/06/2017    SpO2 98%    BMI 26.16 kg/m2     Physical Exam:   General appearance - alert, well appearing, and in no distress  Mental status - alert, oriented to person, place, and time  EYE-ROCKY, EOMI, corneas normal, no foreign bodies  ENT-ENT exam normal, no neck nodes or sinus tenderness  Nose - normal and patent, no erythema, discharge or polyps  Mouth - mucous membranes moist, pharynx normal without lesions  Neck - supple, no significant adenopathy   Chest - clear to auscultation, no wheezes, rales or rhonchi, symmetric air entry   Heart - normal rate, regular rhythm, normal S1, S2, no murmurs, rubs, clicks or gallops   Abdomen - soft, nontender, nondistended, no masses or organomegaly  Lymph- no adenopathy palpable  Ext-peripheral pulses normal, no pedal edema, no clubbing or cyanosis  Skin-Warm and dry. no hyperpigmentation, vitiligo, or suspicious lesions  Neuro -alert, oriented, normal speech, no focal findings or movement disorder noted  Neck-normal C-spine, no tenderness, full ROM without pain  Feet-no nail deformities or callus formation with good pulses noted      Results for orders placed or performed in visit on 11/14/17   AMB POC LIPID PROFILE   Result Value Ref Range    Cholesterol (POC) 122     Triglycerides (POC) 182     HDL Cholesterol (POC) 45     Non-HDL Goal (POC) 77     LDL Cholesterol (POC) 40 MG/DL    TChol/HDL Ratio (POC) 2.7        Assessment/Plan:    ICD-10-CM ICD-9-CM    1. Essential hypertension I10 401.9    2. Anxiety and depression F41.8 300.00 ALPRAZolam (XANAX) 0.5 mg tablet     311 REFERRAL TO PSYCHIATRY     Orders Placed This Encounter    REFERRAL TO PSYCHIATRY     Referral Priority:   Routine     Referral Type:   Behavioral Health     Referral Reason:   Specialty Services Required     Number of Visits Requested:   1    ALPRAZolam (XANAX) 0.5 mg tablet     Sig: Take 1 Tab by mouth two (2) times a day. Max Daily Amount: 1 mg. Dispense:  60 Tab     Refill:  1     lose weight, increase physical activity,Take 81mg aspirin daily  Patient Instructions   [employer] Activation    Thank you for requesting access to [employer]. Please follow the instructions below to securely access and download your online medical record.  [employer] allows you to send messages to your doctor, view your test results, renew your prescriptions, schedule appointments, and more. How Do I Sign Up? 1. In your internet browser, go to [email or web address]. [email or web address]  2. Click on the First Time User? Click Here link in the Sign In box. You will be redirect to the New Member Sign Up page. 3. Enter your [employer] Access Code exactly as it appears below. You will not need to use this code after youve completed the sign-up process. If you do not sign up before the expiration date, you must request a new code. [employer] Access Code: [email or web address]  Expires: 2018  3:57 PM (This is the date your [employer] access code will )    4. Enter the last four digits of your Social Security Number (xxxx) and Date of Birth (mm/dd/yyyy) as indicated and click Submit. You will be taken to the next sign-up page. 5. Create a [employer] ID. This will be your [employer] login ID and cannot be changed, so think of one that is secure and easy to remember. 6. Create a [employer] password. You can change your password at any time. 7. Enter your Password Reset Question and Answer. This can be used at a later time if you forget your password. 8. Enter your e-mail address. You will receive e-mail notification when new information is available in [address] 9. Click Sign Up. You can now view and download portions of your medical record. 10. Click the Download Summary menu link to download a portable copy of your medical information. Additional Information    If you have questions, please visit the Frequently Asked Questions section of the [employer] website at [email or web address] Remember, [employer] is NOT to be used for urgent needs. For medical emergencies, dial 911. Follow-up Disposition:  Return in about 4 weeks (around 1/3/2018), or if symptoms worsen or fail to improve. I have reviewed with the patient details of the assessment and plan and all questions were answered. Relevent patient education was performed. The most recent lab findings were reviewed with the patient. An After Visit Summary was printed and given to the patient. Pt presented to ED with left side chest pain radiating to left upper back, SOB, dizziness, tingling sensation to bilateral hands and lips, left side headache. As per pt, onset of symptoms was 2hr prior to ED arrival. Pt is A&O x 3, able to speak coherently in full sentences, respiration unlabored and even, skin warm and non-diaphoretic. Patient

## 2021-10-02 ENCOUNTER — TELEPHONE (OUTPATIENT)
Dept: FAMILY MEDICINE CLINIC | Age: 51
End: 2021-10-02

## 2021-10-25 ENCOUNTER — OFFICE VISIT (OUTPATIENT)
Dept: FAMILY MEDICINE CLINIC | Age: 51
End: 2021-10-25
Payer: COMMERCIAL

## 2021-10-25 VITALS
WEIGHT: 196.4 LBS | OXYGEN SATURATION: 97 % | RESPIRATION RATE: 16 BRPM | SYSTOLIC BLOOD PRESSURE: 170 MMHG | HEIGHT: 67 IN | HEART RATE: 70 BPM | DIASTOLIC BLOOD PRESSURE: 102 MMHG | TEMPERATURE: 98 F | BODY MASS INDEX: 30.83 KG/M2

## 2021-10-25 DIAGNOSIS — J44.9 CHRONIC OBSTRUCTIVE PULMONARY DISEASE, UNSPECIFIED COPD TYPE (HCC): ICD-10-CM

## 2021-10-25 DIAGNOSIS — E66.9 CLASS 1 OBESITY WITH SERIOUS COMORBIDITY AND BODY MASS INDEX (BMI) OF 30.0 TO 30.9 IN ADULT, UNSPECIFIED OBESITY TYPE: ICD-10-CM

## 2021-10-25 DIAGNOSIS — R53.82 CHRONIC FATIGUE: ICD-10-CM

## 2021-10-25 DIAGNOSIS — I10 PRIMARY HYPERTENSION: Primary | ICD-10-CM

## 2021-10-25 DIAGNOSIS — Z72.0 TOBACCO ABUSE: ICD-10-CM

## 2021-10-25 DIAGNOSIS — Z13.220 SCREENING CHOLESTEROL LEVEL: ICD-10-CM

## 2021-10-25 PROCEDURE — 99204 OFFICE O/P NEW MOD 45 MIN: CPT | Performed by: STUDENT IN AN ORGANIZED HEALTH CARE EDUCATION/TRAINING PROGRAM

## 2021-10-25 RX ORDER — CANDESARTAN CILEXETIL AND HYDROCHLOROTHIAZIDE 16; 12.5 MG/1; MG/1
TABLET ORAL
Qty: 30 TABLET | Refills: 0 | Status: CANCELLED | OUTPATIENT
Start: 2021-10-25

## 2021-10-25 RX ORDER — ALBUTEROL SULFATE 90 UG/1
2 AEROSOL, METERED RESPIRATORY (INHALATION)
Qty: 18 G | Refills: 5 | Status: SHIPPED | OUTPATIENT
Start: 2021-10-25

## 2021-10-25 RX ORDER — CANDESARTAN CILEXETIL AND HYDROCHLOROTHIAZIDE 32; 25 MG/1; MG/1
1 TABLET ORAL DAILY
Qty: 90 TABLET | Refills: 0 | Status: SHIPPED
Start: 2021-10-25 | End: 2021-11-30 | Stop reason: SINTOL

## 2021-10-25 NOTE — PROGRESS NOTES
[address] [facility]  [address], [address] [place] [address]  [phone]    C/C: HTN, Fatigue AND COPD    HPI:    Issa Gonsales is a 54 y.o. female who presents to clinic today for evaluation of the issues listed above. Pt is new to the practice . Previous pcp: Allan Levin NP    Subjective;    HTN:  Poorly controlled. Pt states that BP was previously well controlled on 3 medications and it was deescalated to candesartan-HCT at half strength  Ran out of her meds and went to urgent care where she had few days refill. COPD  Smokes 1 ppd. On symbicort. No albuterol    Fatigue, chronic    Pt denies any  fever, chill, night sweats, chest pain, pressure, SOB. Intermittent ZAPATA due to her COPD. Other Health Habits and social history:  Smoking history: 1ppd  Physical activity: at work  Occupation: [employer]- reviewed:    Allergies   Allergen Reactions    Hydromorphone Itching     morphine    Morphine Itching    Sulfa (Sulfonamide Antibiotics) Nausea and Vomiting    Hydralazine Rash and Unknown (comments)     jitteriness         Past Medical History- reviewed:  Past Medical History:   Diagnosis Date    Anemia 2/18/2012    Cocaine abuse affecting pregnancy ([facility]     Essential hypertension     Hypertension     SVT (supraventricular tachycardia) ([facility] 11/16/2018       Family History - reviewed:  Family History   Problem Relation Age of Onset    Hypertension Father     Alcohol abuse Father     Breast Cancer Sister     Cancer Brother         Throat versus Thyroid    No Known Problems Son     No Known Problems Son     No Known Problems Daughter        Social History - reviewed:  Social History     Socioeconomic History    Marital status: LEGALLY      Spouse name: Not on file    Number of children: Not on file    Years of education: Not on file    Highest education level: Not on file   Occupational History    Occupation: Unemployed   Tobacco Use    Smoking status: Current Every Day Smoker     Packs/day: 1.00    Smokeless tobacco: Never Used   Vaping Use    Vaping Use: Never used   Substance and Sexual Activity    Alcohol use: Not Currently     Alcohol/week: 1.0 - 2.0 standard drinks     Types: 1 - 2 Cans of beer per week     Comment: a week    Drug use: Yes     Frequency: 1.0 times per week     Types: Cocaine    Sexual activity: Yes     Partners: Male     Birth control/protection: None   Other Topics Concern    Not on file   Social History Narrative    Unemployed     Social Determinants of Health     Financial Resource Strain:     Difficulty of Paying Living Expenses:    Food Insecurity:     Worried About Running Out of Food in the Last Year:     Ran Out of Food in the Last Year:    Transportation Needs:     Lack of Transportation (Medical):  Lack of Transportation (Non-Medical):    Physical Activity:     Days of Exercise per Week:     Minutes of Exercise per Session:    Stress:     Feeling of Stress :    Social Connections:     Frequency of Communication with Friends and Family:     Frequency of Social Gatherings with Friends and Family:     Attends Christianity Services:     Active Member of Clubs or Organizations:     Attends Club or Organization Meetings:     Marital Status:    Intimate Partner Violence:     Fear of Current or Ex-Partner:     Emotionally Abused:     Physically Abused:     Sexually Abused:        Review of systems:     A comprehensive review of systems was negative except for that written in the History of Present Illness. Visit Vitals  BP (!) 170/102 (BP 1 Location: Left upper arm, BP Patient Position: Sitting, BP Cuff Size: Adult)   Pulse 70   Temp 98 °F (36.7 °C) (Oral)   Resp 16   Ht 5' 7\" (1.702 m)   Wt 196 lb 6.4 oz (89.1 kg)   SpO2 97%   BMI 30.76 kg/m²       General: Alert and oriented, in no acute distress. Well nourished. EYE: PERRL.  Sclera and conjuctival clear. Extraocular movements intact. EARS: External normal, canals clear, tympanic membranes normal.   NOSE: Mucosa healthy without drainage or ulceration. OROPHARYNX: No suspicious lesions, normal dentition, pharynx, tongue and tonsils normal.  NECK: Supple; no masses; thyroid normal.  LUNGS: Respirations unlabored; clear to auscultation bilaterally. CARDIOVASCULAR: Regular, rate, and rhythm without murmurs, gallops or rubs. ABDOMEN: Soft; nontender; nondistended; normoactive bowel sounds; no masses or organomegaly. MUSCULOSKELETAL: FROM in all extremities     EXT: No edema. Neurovascularlly intact. Normal gait. SKIN: No rash. No suspicious lesions or moles. Neuro: Mental Status: Pt is alert and oriented to person, place, and time. Assessment/Plan       ICD-10-CM ICD-9-CM    1. Primary hypertension  I10 401.9 candesartan-hydroCHLOROthiazide (ATACAND HCT) 32-25 mg tab per tablet      METABOLIC PANEL, COMPREHENSIVE   2. Chronic obstructive pulmonary disease, unspecified COPD type (HCC)  J44.9 496 albuterol (PROVENTIL HFA, VENTOLIN HFA, PROAIR HFA) 90 mcg/actuation inhaler   3. Screening cholesterol level  Z13.220 V77.91 LIPID PANEL   4. Class 1 obesity with serious comorbidity and body mass index (BMI) of 30.0 to 30.9 in adult, unspecified obesity type  E66.9 278.00 HEMOGLOBIN A1C WITH EAG    Z68.30 V85.30    5. Chronic fatigue  R53.82 780.79 TSH 3RD GENERATION   6. Tobacco abuse  Z72.0 305.1        1. Primary hypertension  Uncontrolled. No headache or visual change. Will increase dose of meds  - Increase to candesartan-hydroCHLOROthiazide (ATACAND HCT) 32-25 mg tab per tablet; Take 1 Tablet by mouth daily.    - METABOLIC PANEL, COMPREHENSIVE; Future    2. Chronic obstructive pulmonary disease, unspecified COPD type (HCC)  stable  - albuterol (PROVENTIL HFA, VENTOLIN HFA, PROAIR HFA) 90 mcg/actuation inhaler; Take 2 Puffs by inhalation every six (6) hours as needed for Wheezing.   Dispense: 18 g; Refill: 5    3. Screening cholesterol level  - LIPID PANEL; Future    4. Class 1 obesity with serious comorbidity and body mass index (BMI) of 30.0 to 30.9 in adult, unspecified obesity type  - HEMOGLOBIN A1C WITH EAG; Future  - I counseled on lifestyle changes; discussed of the importance of eating habits/patterns and physical activity to overall health. Also discussed the importance to avoid smoking and excessive alcohol consumption.  - Encouraged to eat foods that are baked, broiled, boiled, steamed or grilled. Avoid greasy or fried foods. Use olive oil or canola oil instead of vegetable oil. Eat fish twice weekly. Decreasing weight by 5-10% of baseline weight over the next 6 months if overweight/obese helps to improve obesity-related conditions. Eat a low carbohydrate diet. Decrease sugary beverages, white foods and sweets. Increase exercise to 5 days weekly for 30 minutes daily minimally and as tolerated. Have at an average of 7 hours of uninterrupted sleep at night. 5. Chronic fatigue  - TSH 3RD GENERATION; Future    6. Tobacco abuse  -The patient was counseled on the dangers of tobacco use, and was advised to quit. Reviewed strategies to maximize success. Pt not willing to quit at this time. Follow up: 4-6 weeks for BP management    I have discussed the diagnosis with the patient and the intended plan as seen in the above orders. The patient has received an after-visit summary and questions were answered concerning future plans. I have discussed medication side effects and warnings with the patient as well. Informed patient to return to the office if new symptoms arise.     Signed By: Jenny Zuleta MD     October 25, 2021

## 2021-10-25 NOTE — PROGRESS NOTES
Name and  Verified. Previous PCP:  Caprice Reyes NP ( [place])    Pharmacy verified      Chief Complaint   Patient presents with    New Patient    Establish Care    Hypertension    Medication Refill       1. Have you been to the ER, urgent care clinic since your last visit? Hospitalized since your last visit? No    2. Have you seen or consulted any other health care providers outside of the [address] [place] [address] since your last visit? Include any pap smears or colon screening.  No      Health Maintenance Due   Topic Date Due    Hepatitis C Screening  Never done    Pneumococcal 0-64 years (1 of 2 - PPSV23) Never done    Cervical cancer screen  Never done    Colorectal Cancer Screening Combo  Never done    Breast Cancer Screen Mammogram  2020    Flu Vaccine (1) 2021

## 2021-10-29 ENCOUNTER — DOCUMENTATION ONLY (OUTPATIENT)
Dept: FAMILY MEDICINE CLINIC | Age: 51
End: 2021-10-29

## 2021-11-30 ENCOUNTER — HOSPITAL ENCOUNTER (EMERGENCY)
Age: 51
Discharge: HOME | End: 2021-11-30
Attending: EMERGENCY MEDICINE
Payer: COMMERCIAL

## 2021-11-30 ENCOUNTER — APPOINTMENT (OUTPATIENT)
Dept: CT IMAGING | Age: 51
End: 2021-11-30
Attending: STUDENT IN AN ORGANIZED HEALTH CARE EDUCATION/TRAINING PROGRAM
Payer: COMMERCIAL

## 2021-11-30 ENCOUNTER — APPOINTMENT (OUTPATIENT)
Dept: GENERAL RADIOLOGY | Age: 51
End: 2021-11-30
Attending: STUDENT IN AN ORGANIZED HEALTH CARE EDUCATION/TRAINING PROGRAM
Payer: COMMERCIAL

## 2021-11-30 VITALS
BODY MASS INDEX: 31.52 KG/M2 | WEIGHT: 200.84 LBS | HEIGHT: 67 IN | SYSTOLIC BLOOD PRESSURE: 183 MMHG | OXYGEN SATURATION: 95 % | DIASTOLIC BLOOD PRESSURE: 93 MMHG | RESPIRATION RATE: 20 BRPM | TEMPERATURE: 99 F | HEART RATE: 60 BPM

## 2021-11-30 DIAGNOSIS — R22.0 MOUTH SWELLING: Primary | ICD-10-CM

## 2021-11-30 DIAGNOSIS — T78.3XXA ANGIOEDEMA, INITIAL ENCOUNTER: ICD-10-CM

## 2021-11-30 LAB
ALBUMIN SERPL-MCNC: 3.4 G/DL (ref 3.5–5)
ALBUMIN/GLOB SERPL: 0.9 {RATIO} (ref 1.1–2.2)
ALP SERPL-CCNC: 71 U/L (ref 45–117)
ALT SERPL-CCNC: 38 U/L (ref 12–78)
ANION GAP SERPL CALC-SCNC: 4 MMOL/L (ref 5–15)
AST SERPL-CCNC: 21 U/L (ref 15–37)
ATRIAL RATE: 55 BPM
BASOPHILS # BLD: 0.1 K/UL (ref 0–0.1)
BASOPHILS NFR BLD: 1 % (ref 0–1)
BILIRUB SERPL-MCNC: 0.3 MG/DL (ref 0.2–1)
BUN SERPL-MCNC: 16 MG/DL (ref 6–20)
BUN/CREAT SERPL: 11 (ref 12–20)
CALCIUM SERPL-MCNC: 9.4 MG/DL (ref 8.5–10.1)
CALCULATED P AXIS, ECG09: 60 DEGREES
CALCULATED R AXIS, ECG10: 4 DEGREES
CALCULATED T AXIS, ECG11: 50 DEGREES
CHLORIDE SERPL-SCNC: 108 MMOL/L (ref 97–108)
CO2 SERPL-SCNC: 31 MMOL/L (ref 21–32)
CREAT SERPL-MCNC: 1.4 MG/DL (ref 0.55–1.02)
DIAGNOSIS, 93000: NORMAL
DIFFERENTIAL METHOD BLD: NORMAL
EOSINOPHIL # BLD: 0.2 K/UL (ref 0–0.4)
EOSINOPHIL NFR BLD: 3 % (ref 0–7)
ERYTHROCYTE [DISTWIDTH] IN BLOOD BY AUTOMATED COUNT: 12.3 % (ref 11.5–14.5)
GLOBULIN SER CALC-MCNC: 3.7 G/DL (ref 2–4)
GLUCOSE BLD STRIP.AUTO-MCNC: 125 MG/DL (ref 65–117)
GLUCOSE SERPL-MCNC: 130 MG/DL (ref 65–100)
HCT VFR BLD AUTO: 43 % (ref 35–47)
HGB BLD-MCNC: 15.3 G/DL (ref 11.5–16)
IMM GRANULOCYTES # BLD AUTO: 0 K/UL (ref 0–0.04)
IMM GRANULOCYTES NFR BLD AUTO: 0 % (ref 0–0.5)
INR PPP: 1 (ref 0.9–1.1)
LYMPHOCYTES # BLD: 2 K/UL (ref 0.8–3.5)
LYMPHOCYTES NFR BLD: 29 % (ref 12–49)
MCH RBC QN AUTO: 33.8 PG (ref 26–34)
MCHC RBC AUTO-ENTMCNC: 35.6 G/DL (ref 30–36.5)
MCV RBC AUTO: 95.1 FL (ref 80–99)
MONOCYTES # BLD: 0.4 K/UL (ref 0–1)
MONOCYTES NFR BLD: 6 % (ref 5–13)
NEUTS SEG # BLD: 4.3 K/UL (ref 1.8–8)
NEUTS SEG NFR BLD: 61 % (ref 32–75)
NRBC # BLD: 0 K/UL (ref 0–0.01)
NRBC BLD-RTO: 0 PER 100 WBC
P-R INTERVAL, ECG05: 126 MS
PLATELET # BLD AUTO: 199 K/UL (ref 150–400)
PMV BLD AUTO: 11 FL (ref 8.9–12.9)
POTASSIUM SERPL-SCNC: 4.1 MMOL/L (ref 3.5–5.1)
PROT SERPL-MCNC: 7.1 G/DL (ref 6.4–8.2)
PROTHROMBIN TIME: 10.8 SEC (ref 9–11.1)
Q-T INTERVAL, ECG07: 458 MS
QRS DURATION, ECG06: 94 MS
QTC CALCULATION (BEZET), ECG08: 438 MS
RBC # BLD AUTO: 4.52 M/UL (ref 3.8–5.2)
SERVICE CMNT-IMP: ABNORMAL
SODIUM SERPL-SCNC: 143 MMOL/L (ref 136–145)
VENTRICULAR RATE, ECG03: 55 BPM
WBC # BLD AUTO: 7 K/UL (ref 3.6–11)

## 2021-11-30 PROCEDURE — 85025 COMPLETE CBC W/AUTO DIFF WBC: CPT

## 2021-11-30 PROCEDURE — 71045 X-RAY EXAM CHEST 1 VIEW: CPT

## 2021-11-30 PROCEDURE — 70496 CT ANGIOGRAPHY HEAD: CPT

## 2021-11-30 PROCEDURE — 80053 COMPREHEN METABOLIC PANEL: CPT

## 2021-11-30 PROCEDURE — 96374 THER/PROPH/DIAG INJ IV PUSH: CPT

## 2021-11-30 PROCEDURE — 74011250636 HC RX REV CODE- 250/636: Performed by: STUDENT IN AN ORGANIZED HEALTH CARE EDUCATION/TRAINING PROGRAM

## 2021-11-30 PROCEDURE — 93005 ELECTROCARDIOGRAM TRACING: CPT

## 2021-11-30 PROCEDURE — 85610 PROTHROMBIN TIME: CPT

## 2021-11-30 PROCEDURE — 36415 COLL VENOUS BLD VENIPUNCTURE: CPT

## 2021-11-30 PROCEDURE — 82962 GLUCOSE BLOOD TEST: CPT

## 2021-11-30 PROCEDURE — 99285 EMERGENCY DEPT VISIT HI MDM: CPT

## 2021-11-30 PROCEDURE — 74011000250 HC RX REV CODE- 250: Performed by: STUDENT IN AN ORGANIZED HEALTH CARE EDUCATION/TRAINING PROGRAM

## 2021-11-30 PROCEDURE — 70450 CT HEAD/BRAIN W/O DYE: CPT

## 2021-11-30 PROCEDURE — 74011000636 HC RX REV CODE- 636: Performed by: EMERGENCY MEDICINE

## 2021-11-30 PROCEDURE — 96375 TX/PRO/DX INJ NEW DRUG ADDON: CPT

## 2021-11-30 RX ORDER — DIPHENHYDRAMINE HYDROCHLORIDE 50 MG/ML
25 INJECTION, SOLUTION INTRAMUSCULAR; INTRAVENOUS
Status: COMPLETED | OUTPATIENT
Start: 2021-11-30 | End: 2021-11-30

## 2021-11-30 RX ORDER — DIPHENHYDRAMINE HCL 25 MG
25 CAPSULE ORAL EVERY 6 HOURS
Qty: 12 CAPSULE | Refills: 0 | Status: SHIPPED | OUTPATIENT
Start: 2021-11-30 | End: 2021-12-03

## 2021-11-30 RX ORDER — HYDROCHLOROTHIAZIDE 25 MG/1
25 TABLET ORAL DAILY
Qty: 10 TABLET | Refills: 0 | Status: SHIPPED
Start: 2021-11-30 | End: 2021-12-03 | Stop reason: ALTCHOICE

## 2021-11-30 RX ORDER — FAMOTIDINE 20 MG/1
20 TABLET, FILM COATED ORAL 2 TIMES DAILY
Qty: 6 TABLET | Refills: 0 | Status: SHIPPED | OUTPATIENT
Start: 2021-11-30 | End: 2022-01-03

## 2021-11-30 RX ORDER — PREDNISONE 20 MG/1
40 TABLET ORAL DAILY
Qty: 10 TABLET | Refills: 0 | Status: SHIPPED | OUTPATIENT
Start: 2021-11-30 | End: 2021-12-10

## 2021-11-30 RX ADMIN — IOPAMIDOL 100 ML: 755 INJECTION, SOLUTION INTRAVENOUS at 11:05

## 2021-11-30 RX ADMIN — DIPHENHYDRAMINE HYDROCHLORIDE 25 MG: 50 INJECTION, SOLUTION INTRAMUSCULAR; INTRAVENOUS at 09:40

## 2021-11-30 RX ADMIN — FAMOTIDINE 20 MG: 10 INJECTION, SOLUTION INTRAVENOUS at 09:41

## 2021-11-30 RX ADMIN — METHYLPREDNISOLONE SODIUM SUCCINATE 125 MG: 125 INJECTION, POWDER, FOR SOLUTION INTRAMUSCULAR; INTRAVENOUS at 09:41

## 2021-11-30 NOTE — ED PROVIDER NOTES
EMERGENCY DEPARTMENT HISTORY AND PHYSICAL EXAM      Date: 11/30/2021  Patient Name: Anderson Osuna    History of Presenting Illness     Chief Complaint   Patient presents with    Fatigue    Hypertension    Dysphagia       History Provided By: Patient    HPI: Anderson Osuna, 54 y.o. female presents to the ED with cc of HTN, anemia, SVT and cocaine use. Presenting with tongue swelling with associated numbness and tingling that started yesterday morning at 6:30 AM after she took candesartan for her blood pressure. Has been taking candesartan for at least several months. No prior angioedema to any lisinopril or ACE inhibitor. Reports no food allergies or new foods. Tongue swelling and associated numbness and tingling persisted (not worsening), today at work was trying to speak to her boss and became frustrated because she felt like she could not get the words out. Reports no prior history of stroke but does have a family history of stroke. Denies any numbness or tingling, weakness in extremities or issues with balance. Notes mild shortness of breath but no difficulty tolerating secretions, rash, itching, no throat pain, no fevers, chills, works at a , no obvious sick contacts, Covid vaccinated x2. Last use cocaine on Friday, snorting never using IV drugs. There are no other complaints, changes, or physical findings at this time. PCP: Blair Hendricks MD    No current facility-administered medications on file prior to encounter. Current Outpatient Medications on File Prior to Encounter   Medication Sig Dispense Refill    albuterol (PROVENTIL HFA, VENTOLIN HFA, PROAIR HFA) 90 mcg/actuation inhaler Take 2 Puffs by inhalation every six (6) hours as needed for Wheezing. 18 g 5    [DISCONTINUED] candesartan-hydroCHLOROthiazide (ATACAND HCT) 32-25 mg tab per tablet Take 1 Tablet by mouth daily.  90 Tablet 0    Symbicort 160-4.5 mcg/actuation HFAA INHALE TWO PUFFS BY MOUTH TWICE A DAY 3 Inhaler 3       Past History     Past Medical History:  Past Medical History:   Diagnosis Date    Anemia 2012    [facility] abuse affecting pregnancy ([facility] [address]     Essential hypertension     Hypertension     SV[facility]ra[facility]cardia[facility] ([facility] 2018       Past Surgical History:  Past Surgical History:   Procedure Laterality Date    HX  SECTION      HX CHOLECYSTECTOMY         Family History:  Family History   Problem Relation Age of Onset    Hypertension Father     Alcohol abuse Father     Breast Cancer Sister     Cancer Brother         Throat versus Thyroid    No Known Problems Son     No Known Problems Son     No Known Problems Daughter        Social History:  Social History     Tobacco Use    Smoking status: Current Every Day Smoker     Packs/day: 1.00    Smokeless tobacco: Never Used   Vaping Use    Vaping Use: Never used   Substance Use Topics    Alcohol use: Not Currently     Alcohol/week: 1.0 - 2.0 standard drink     Types: 1 - 2 Cans of beer per week     Comment: a week    Drug use: Yes     Frequency: 1.0 times per week     Types: Cocaine       Allergies: Allergies   Allergen Reactions    Hydromorphone Itching     morphine    Morphine Itching    Sulfa (Sulfonamide Antibiotics) Nausea and Vomiting    Hydralazine Rash and Unknown (comments)     jitteriness           Review of Systems   Review of Systems   Constitutional: Positive for fatigue. Negative for chills, diaphoresis and fever. HENT: Negative for drooling, facial swelling, mouth sores and rhinorrhea. Respiratory: Positive for shortness of breath. Negative for cough. Cardiovascular: Negative for chest pain, palpitations and leg swelling. Gastrointestinal: Negative for abdominal pain, diarrhea, nausea and vomiting. Genitourinary: Negative for frequency. All other systems reviewed and are negative.       Physical Exam   Physical Exam  Constitutional:       General: She is not in acute distress. Appearance: She is normal weight. She is not ill-appearing or diaphoretic. HENT:      Head: Normocephalic and atraumatic. Nose: Nose normal.      Mouth/Throat:      Mouth: Mucous membranes are moist.      Comments: Ear posterior oropharynx, right-sided tongue slightly more swollen than the left-sided  Eyes:      Extraocular Movements: Extraocular movements intact. Conjunctiva/sclera: Conjunctivae normal.   Cardiovascular:      Rate and Rhythm: Regular rhythm. Bradycardia present. Pulmonary:      Effort: Pulmonary effort is normal.   Abdominal:      General: Abdomen is flat. Bowel sounds are normal.      Palpations: Abdomen is soft. Musculoskeletal:         General: No swelling, tenderness, deformity or signs of injury. Cervical back: Normal range of motion and neck supple. Right lower leg: No edema. Left lower leg: No edema. Skin:     General: Skin is warm and dry. Capillary Refill: Capillary refill takes less than 2 seconds. Findings: No rash. Neurological:      General: No focal deficit present. Mental Status: She is alert and oriented to person, place, and time. Mental status is at baseline. Cranial Nerves: No cranial nerve deficit. Sensory: No sensory deficit. Motor: No weakness. Coordination: Coordination normal.      Gait: Gait normal.      Deep Tendon Reflexes: Reflexes normal.         Diagnostic Study Results     Labs -     Recent Results (from the past 12 hour(s))   EKG, 12 LEAD, INITIAL    Collection Time: 11/30/21  9:19 AM   Result Value Ref Range    Ventricular Rate 55 BPM    Atrial Rate 55 BPM    P-R Interval 126 ms    QRS Duration 94 ms    Q-T Interval 458 ms    QTC Calculation (Bezet) 438 ms    Calculated P Axis 60 degrees    Calculated R Axis 4 degrees    Calculated T Axis 50 degrees    Diagnosis       Sinus bradycardia  Possible Left atrial enlargement  When compared with ECG of 17-NOV-2018 02:46,  Vent.  rate has decreased  BPM  ST no longer depressed in Inferior leads  ST no longer depressed in Anterolateral leads  T wave inversion no longer evident in Inferior leads  T wave inversion no longer evident in Lateral leads     CBC WITH AUTOMATED DIFF    Collection Time: 11/30/21  9:23 AM   Result Value Ref Range    WBC 7.0 3.6 - 11.0 K/uL    RBC 4.52 3.80 - 5.20 M/uL    HGB 15.3 11.5 - 16.0 g/dL    HCT 43.0 35.0 - 47.0 %    MCV 95.1 80.0 - 99.0 FL    MCH 33.8 26.0 - 34.0 PG    MCHC 35.6 30.0 - 36.5 g/dL    RDW 12.3 11.5 - 14.5 %    PLATELET 584 532 - 272 K/uL    MPV 11.0 8.9 - 12.9 FL    NRBC 0.0 0  WBC    ABSOLUTE NRBC 0.00 0.00 - 0.01 K/uL    NEUTROPHILS 61 32 - 75 %    LYMPHOCYTES 29 12 - 49 %    MONOCYTES 6 5 - 13 %    EOSINOPHILS 3 0 - 7 %    BASOPHILS 1 0 - 1 %    IMMATURE GRANULOCYTES 0 0.0 - 0.5 %    ABS. NEUTROPHILS 4.3 1.8 - 8.0 K/UL    ABS. LYMPHOCYTES 2.0 0.8 - 3.5 K/UL    ABS. MONOCYTES 0.4 0.0 - 1.0 K/UL    ABS. EOSINOPHILS 0.2 0.0 - 0.4 K/UL    ABS. BASOPHILS 0.1 0.0 - 0.1 K/UL    ABS. IMM. GRANS. 0.0 0.00 - 0.04 K/UL    DF AUTOMATED     METABOLIC PANEL, COMPREHENSIVE    Collection Time: 11/30/21  9:23 AM   Result Value Ref Range    Sodium 143 136 - 145 mmol/L    Potassium 4.1 3.5 - 5.1 mmol/L    Chloride 108 97 - 108 mmol/L    CO2 31 21 - 32 mmol/L    Anion gap 4 (L) 5 - 15 mmol/L    Glucose 130 (H) 65 - 100 mg/dL    BUN 16 6 - 20 MG/DL    Creatinine 1.40 (H) 0.55 - 1.02 MG/DL    BUN/Creatinine ratio 11 (L) 12 - 20      GFR est AA 48 (L) >60 ml/min/1.73m2    GFR est non-AA 40 (L) >60 ml/min/1.73m2    Calcium 9.4 8.5 - 10.1 MG/DL    Bilirubin, total 0.3 0.2 - 1.0 MG/DL    ALT (SGPT) 38 12 - 78 U/L    AST (SGOT) 21 15 - 37 U/L    Alk.  phosphatase 71 45 - 117 U/L    Protein, total 7.1 6.4 - 8.2 g/dL    Albumin 3.4 (L) 3.5 - 5.0 g/dL    Globulin 3.7 2.0 - 4.0 g/dL    A-G Ratio 0.9 (L) 1.1 - 2.2     PROTHROMBIN TIME + INR    Collection Time: 11/30/21  9:23 AM   Result Value Ref Range    INR 1.0 0.9 - 1.1 Prothrombin time 10.8 9.0 - 11.1 sec   GLUCOSE, POC    Collection Time: 11/30/21  9:29 AM   Result Value Ref Range    Glucose (POC) 125 (H) 65 - 117 mg/dL    Performed by Rena Guadarrama EDT        Radiologic Studies -   CT HEAD WO CONT   Final Result   No acute intracranial hemorrhage, mass or infarct. CTA HEAD NECK W CONT   Final Result   1. No intracranial large vessel occlusion. XR CHEST PORT   Final Result        CT Results  (Last 48 hours)               11/30/21 1104  CT HEAD WO CONT Final result    Impression:  No acute intracranial hemorrhage, mass or infarct. Narrative:  INDICATION: Fatigue, hypertension, dysphasia. Exam: Noncontrast CT of the brain is performed with 5 mm collimation. CT dose reduction was achieved with the use of the standardized protocol   tailored for this examination and automatic exposure control for dose   modulation. Direct comparison is made to prior CT dated February 2012. FINDINGS: There is no acute intracranial hemorrhage, mass, mass effect or   herniation. Ventricular system is normal. The gray-white matter differentiation   is well-preserved. The mastoid air cells are well pneumatized. The visualized   paranasal sinuses are normal.           11/30/21 1104  CTA HEAD NECK W CONT Final result    Impression:  1. No intracranial large vessel occlusion. Narrative:  EXAM: CTA HEAD NECK W CONT       INDICATION: stroke eval, speech. Fatigue, hypertension, dysphagia. COMPARISON: Direct comparison is made to CT brain dated November 30, 2021 and   prior CT brain dated February 2012. CONTRAST: 100 mL of intravenous nonionic Isovue 370. TECHNIQUE:  Unenhanced  images were obtained to localize the volume for   acquisition. Multislice helical axial CT angiography was performed from the   aortic arch to the top of the head during uneventful rapid bolus intravenous   contrast administration.   Coronal and sagittal reformations and 3D post   processing was performed. CT dose reduction was achieved through use of a   standardized protocol tailored for this examination and automatic exposure   control for dose modulation. FINDINGS:       CTA NECK   Note is made of an aberrant right subclavian artery. . The bilateral subclavian,   common carotid, and internal carotid arteries are patent with no flow-limiting   stenosis. % of right carotid artery stenosis:  0     % of left carotid artery stenosis:  0     Measurements utilized NASCET criteria. NASCET method was utilized for calculating stenosis. The right subclavian artery arises from the medial margin of the descending   thoracic aorta, courses posterior to the trachea and empties into the right   brachiocephalic artery consistent with a congenital variant. The vertebral arteries are patent. There is a dominant right vertebral artery. The cervical soft tissues are unremarkable. There are degenerative changes of   the cervical spine. There are mild biapical emphysematous changes. Mastoid air   cells are well pneumatized. Visualized nasal sinuses are clear. CTA HEAD   The basilar artery and its branches are normal. The internal carotid, anterior   cerebral, and middle cerebral arteries are patent. There is no flow-limiting   intracranial stenosis. There is no aneurysm. There are no sizable posterior   communicating arteries. CXR Results  (Last 48 hours)               11/30/21 1006  XR CHEST PORT Final result    Impression:  No acute cardiopulmonary disease. Narrative:  INDICATION: Fatigue, hypertension, dysphagia. Portable AP upright view of the chest.       Direct comparison made to prior chest x-ray dated December 2016. Cardiomediastinal silhouette is stable. Lungs are clear bilaterally. Pleural   spaces are normal and there is no pneumothorax. Osseous structures are intact.                  Medical Decision Making   I am the first provider for this patient. I reviewed the vital signs, available nursing notes, past medical history, past surgical history, family history and social history. Vital Signs-Reviewed the patient's vital signs. Patient Vitals for the past 12 hrs:   Temp Pulse Resp BP SpO2   11/30/21 1030 -- 65 18 (!) 192/99 --   11/30/21 1020 -- 64 18 (!) 159/109 --   11/30/21 1000 -- (!) 56 19 (!) 187/122 --   11/30/21 0903 99.3 °F (37.4 °C) 63 21 (!) 203/113 97 %       EKG interpretation: (Preliminary)  Rhythm: sinus bradycardia; and regular . Rate (approx.): 55; Axis: normal; SC interval: normal; QRS interval: normal ; ST/T wave: normal; Other findings: normal.    Records Reviewed: Old Medical Records    Provider Notes (Medical Decision Making):   DDX: Angioedema in setting of allergy versus ARB, stroke, TIA    42-year-old female prior history of hypertension, cocaine use presenting with tongue swelling with associated numbness and tingling that started suddenly at 6:30 in the morning yesterday has been persistent since then and today came in because she was frustrated due to difficulty getting words out does not sound to be dysarthric but notes that this is not her normal speech. Full neurologic assessment is intact including no word finding difficulties. Notably patient takes candesartan last took medication yesterday morning did not take blood pressure pills today and blood pressure noted to be in the 200s baseline blood pressure in the 130s per patient. Differential at this time includes angioedema possibly from allergy but more likely to be due to ARB given persistent since 6:30 AM yesterday and no rash or itching noted. Only difficult to exclude stroke given no major swelling but having trouble with phonation from baseline. Ordered stroke evaluation as well as CBC CMP EKG and chest x-ray to evaluate for shortness of breath.   Given concern for potential stroke will allow permissive hypertension current setting. She is without any chest pain or cardiac concerns. ED Course:   Initial assessment performed. The patients presenting problems have been discussed, and they are in agreement with the care plan formulated and outlined with them. I have encouraged them to ask questions as they arise throughout their visit. ED Course as of 11/30/21 1207   Tue Nov 30, 2021   1172 Reviewed sinus bradycardia heart rate 55, possible left atrial enlargement no other abnormal identified, nonischemic. [SS]   1010 CBC within normal limits, INR 1.0, CMP pending, glucose 125 [SS]   1016 Chest x-ray unremarkable [SS]   1025 CMP with electrolytes within normal limits, creatinine 1.4 increased from 1.2 1-year ago. Patient reassessed states that she does not have any worsening tongue swelling or numbness but has not significantly improved after methylprednisolone, famotidine, Benadryl [SS]   1125 CT head negative, CTA head and neck pending [SS]   1152 CTA head and neck are also negative. Work-up thus far has been discussed with patient suspect patient's candesartan may be contributing to symptoms told to discontinue. Will discharge with Benadryl famotidine and prednisone for 5 days. Patient to follow-up with PCP and initiate alternative blood pressure prescription. [SS]      ED Course User Index  [SS] Enedina Kwan MD     Disposition:  home    DISCHARGE PLAN:  1. Current Discharge Medication List      START taking these medications    Details   diphenhydrAMINE (BenadryL) 25 mg capsule Take 1 Capsule by mouth every six (6) hours for 3 days. Qty: 12 Capsule, Refills: 0  Start date: 11/30/2021, End date: 12/3/2021      famotidine (PEPCID) 20 mg tablet Take 1 Tablet by mouth two (2) times a day. Qty: 6 Tablet, Refills: 0  Start date: 11/30/2021      predniSONE (DELTASONE) 20 mg tablet Take 40 mg by mouth daily for 10 doses.  take 40mg daily (20mg tabs x2,) With Breakfast for 5 days  Qty: 10 Tablet, Refills: 0  Start date: 11/30/2021, End date: 12/10/2021      hydroCHLOROthiazide (HYDRODIURIL) 25 mg tablet Take 1 Tablet by mouth daily for 10 days. Qty: 10 Tablet, Refills: 0  Start date: 11/30/2021, End date: 12/10/2021           2. Follow-up Information     Follow up With Specialties Details Why Contact Info    Lisa Red MD Family Medicine In 1 week  [address]  [phone]      [facility] EMERGENCY DEPT Emergency Medicine In 1 week As needed [address] [facility]  [address] [facility]  [phone]        3. Return to ED if worse     Diagnosis     Clinical Impression:   1. Mouth swelling    2. Angioedema, initial encounter        Attestations:    Mendel Benton, MD    Please note that this dictation was completed with [employer], the computer voice recognition software. Quite often unanticipated grammatical, syntax, homophones, and other interpretive errors are inadvertently transcribed by the computer software. Please disregard these errors. Please excuse any errors that have escaped final proofreading. Thank you. I personally performed a history and physical examination of the patient and discussed the management with the resident including angioedema. I have found the following on physical exam:    Awake, alert, mild distress  No facial droop, slight slurring of words when tongue hits the roof of mouth only. Intact light touch and strength, good finger-to-nose, good heel-to-shin, no neglect, horizontal gaze is intact  Slight swelling of the tongue, right greater than left, mild sublingual swelling, lips normal, posterior oropharynx without soft tissue swelling, uvula is midline. Today's evaluation is not consistent with stroke. I have reviewed the resident's note and agree with the resident's findings, including all diagnostic interpretations, treatment and plan of care including stopping patient's ARB, except as documented below.  I was present during the key portions of separately billed procedures.     Aneesh Corona MD

## 2021-11-30 NOTE — DISCHARGE INSTRUCTIONS
Please follow-up with your primary care doctor in the next 1 to 2 weeks, please change your blood pressure prescription from candesartan to alternative medication. Please do not take candesartan until you see your primary care doctor for evaluation. Start hydrochlorothiazide 25 mg daily, this will likely not be enough to control your blood pressure but will give you appropriate control until you can see your primary care doctor. Please take your blood pressure daily and keep a log for your primary care doctor to review, please come back to the emergency room immediately if you have persistently low blood pressures with dizziness or lightheadedness and stop taking the blood pressure pill. Please take prednisone, Benadryl, famotidine as prescribed. Please come back to the emergency room immediately if you have any worsening tongue or oral swelling.

## 2021-12-03 ENCOUNTER — OFFICE VISIT (OUTPATIENT)
Dept: FAMILY MEDICINE CLINIC | Age: 51
End: 2021-12-03
Payer: COMMERCIAL

## 2021-12-03 VITALS
RESPIRATION RATE: 24 BRPM | DIASTOLIC BLOOD PRESSURE: 110 MMHG | WEIGHT: 192.6 LBS | SYSTOLIC BLOOD PRESSURE: 170 MMHG | HEART RATE: 76 BPM | HEIGHT: 67 IN | BODY MASS INDEX: 30.23 KG/M2 | OXYGEN SATURATION: 98 %

## 2021-12-03 DIAGNOSIS — I10 PRIMARY HYPERTENSION: Primary | ICD-10-CM

## 2021-12-03 DIAGNOSIS — G51.0 FACIAL PARALYSIS/BELLS PALSY: ICD-10-CM

## 2021-12-03 PROCEDURE — 99214 OFFICE O/P EST MOD 30 MIN: CPT | Performed by: STUDENT IN AN ORGANIZED HEALTH CARE EDUCATION/TRAINING PROGRAM

## 2021-12-03 RX ORDER — VALACYCLOVIR HYDROCHLORIDE 1 G/1
1000 TABLET, FILM COATED ORAL 3 TIMES DAILY
Qty: 21 TABLET | Refills: 0 | Status: SHIPPED | OUTPATIENT
Start: 2021-12-03 | End: 2021-12-10

## 2021-12-03 RX ORDER — LOSARTAN POTASSIUM AND HYDROCHLOROTHIAZIDE 25; 100 MG/1; MG/1
1 TABLET ORAL EVERY MORNING
Qty: 90 TABLET | Refills: 1 | Status: SHIPPED | OUTPATIENT
Start: 2021-12-03 | End: 2022-01-03

## 2021-12-03 RX ORDER — PREDNISONE 20 MG/1
TABLET ORAL
Qty: 10 TABLET | Refills: 0 | Status: SHIPPED | OUTPATIENT
Start: 2021-12-03 | End: 2022-01-03 | Stop reason: ALTCHOICE

## 2021-12-03 RX ORDER — AMLODIPINE BESYLATE 10 MG/1
10 TABLET ORAL EVERY EVENING
Qty: 90 TABLET | Refills: 0 | Status: SHIPPED | OUTPATIENT
Start: 2021-12-03 | End: 2022-03-04 | Stop reason: SDUPTHER

## 2021-12-03 NOTE — PATIENT INSTRUCTIONS
1. Facial nerve palsy: Suspect Bell's palsy:  Educated about the differential diagnosis of facial nerve palsy. Also educated about expected time frame for recovery of Bell's palsy; while some patients achieve complete spontaneous recovery within 2 weeks, more than 80% recover within 3 months. - 20mg Prednisone daily x 3 days, then 1/2 tablet daily until it finishes  - Valtrex 1 g TID x 7 days  - We talked about stroke risk factors, signs and symptoms of stroke. To seek immediate medical attention should this happen  - Refer to neuro if persistent. - Discussed with pt about seeing Ophthalmologist for evaluation should he experience worsening  Eye irritation      BLOOD PRESSURE  - START  AMLODIPINE 10MG AT NIGHT, YOU CAN START WITH 1/2 TABLET FOR THE FIRST WEEK  - ONCE YOU COMPLETE THE CANDERSARTAN, TAKE HYZAAR , ONE TABLET IN THE MORNING     Bell's Palsy: Care Instructions  Your Care Instructions     Bell's palsy is paralysis or weakness of the muscles on one side of the face. Often people with Bell's palsy have a droop on one side of the mouth and have trouble completely shutting the eye on the same side. Bell's palsy can also interfere with the sense of taste. These things happen when a nerve in your face becomes inflamed. Bell's palsy is not caused by a stroke. The cause of the nerve inflammation is not known. But some experts think that a virus may cause it. Because of this, doctors sometimes prescribe antiviral medicine to treat it. You also may get medicine to reduce swelling. Bell's palsy usually gets better on its own in a few weeks or months. Follow-up care is a key part of your treatment and safety. Be sure to make and go to all appointments, and call your doctor if you are having problems. It's also a good idea to know your test results and keep a list of the medicines you take. How can you care for yourself at home? · Take your medicines exactly as prescribed.  Call your doctor if you think you are having a problem with your medicine. You will get more details on the specific medicines your doctor prescribes. · Use artificial tears or ointment if your eyes are too dry. Bell's palsy can make your lower eyelid droop, causing a dry eye. · If you cannot completely close your eye, consider using an eye patch while you sleep. · Help yourself blink by using your finger to close and open your eyelid. This may help keep your eye moist.  · Wear glasses or goggles to keep dust and dirt out of your eye. · As feeling comes back to your face, massage your forehead, cheeks, and lips. Massage may make the muscles in your face stronger. · Brush and floss your teeth often to help prevent tooth decay. Bell's palsy can dry up the spit on one side of your mouth. This increases the risk of tooth decay. When should you call for help? Call 911 anytime you think you may need emergency care. For example, call if:    · You have symptoms of a stroke. These may include:  ? Sudden numbness, tingling, weakness, or loss of movement in your face, arm, or leg, especially on only one side of your body. ? Sudden vision changes. ? Sudden trouble speaking. ? Sudden confusion or trouble understanding simple statements. ? Sudden problems with walking or balance. ? A sudden, severe headache that is different from past headaches. Call your doctor now or seek immediate medical care if:    · You have numbness or weakness that spreads beyond one side of your face.     · You have a skin rash or eye pain or redness, or light bothers your eyes.     · You have a new or worse headache. Watch closely for changes in your health, and be sure to contact your doctor if:    · You do not get better as expected. Where can you learn more? Go to http://www.gray.com/  Enter P168 in the search box to learn more about \"Bell's Palsy: Care Instructions. \"  Current as of: April 8, 2021               Content Version: 13.0  © 0068-4549 [employer]. Care instructions adapted under license by [employer] (which disclaims liability or warranty for this information). If you have questions about a medical condition or this instruction, always ask your healthcare professional. [place] [address] any warranty or liability for your use of this information.

## 2021-12-03 NOTE — PROGRESS NOTES
Chief Complaint   Patient presents with   [facility] Follow Up     ER follow up fatigue, HTN, slurred speech,       1. Have you been to the ER, urgent care clinic since your last visit? Hospitalized since your last visit? Yes [facility] (11/30/21)    2. Have you seen or consulted any other health care providers outside of the [address] [place] [address] since your last visit? Include any pap smears or colon screening.  No

## 2021-12-03 NOTE — PROGRESS NOTES
[address]  [address] [facility], [address]  [phone]    C/C: ER follow up and HTN    HPI:    Sue Mathis is a 54 y.o. female who presents to clinic today for evaluation of the issues listed above. Subjective;    ER follow up:  Pt states that she went to the ER on 11/30/21 for evaluation of slurred speech and facial numbness. Workup including CT head and CTA head/neck were unremarkable for stroke. candesartan-HCT was held given ARB was considered as a potential cause of her symptoms (angioedema). Pt was prescribed HCTZ which she did not  at the pharmacy. States that she does not believe symptoms are due to the medications. She resumed taking  candesartan-HCT last night as BP was markedly elevated. Of note, BP has been poorly controlled. Pt states that BP was previously well controlled on 3 medications and it was deescalated to candesartan-HCT at half strength    She still have right facial drooping and slurred speech though she states that the speech have gotten clearer. Denies any tongue swelling, difficulties breathing, headache, chest pain or sob. Other Health Habits and social history:  Smoking history: 1ppd  Physical activity: at work  Occupation: [employer]- reviewed:    Allergies   Allergen Reactions    Hydromorphone Itching     morphine    Morphine Itching    Sulfa (Sulfonamide Antibiotics) Nausea and Vomiting    Hydralazine Rash and Unknown (comments)     jitteriness         Past Medical History- reviewed:  Past Medical History:   Diagnosis Date    Anemia 2/18/2012    Cocaine abuse affecting pregnancy ([facility] [address]     Essential hypertension     Hypertension     SVT (supraventricular tachycardia) ([facility] [address] 11/16/2018       Family History - reviewed:  Family History   Problem Relation Age of Onset    Hypertension Father     Alcohol abuse Father     Breast Cancer Sister     Cancer Brother Throat versus Thyroid    No Known Problems Son     No Known Problems Son     No Known Problems Daughter        Social History - reviewed:  Social History     Socioeconomic History    Marital status: LEGALLY      Spouse name: Not on file    Number of children: Not on file    Years of education: Not on file    Highest education level: Not on file   Occupational History    Occupation: Unemployed   Tobacco Use    Smoking status: Current Every Day Smoker     Packs/day: 1.00    Smokeless tobacco: Never Used   Vaping Use    Vaping Use: Never used   Substance and Sexual Activity    Alcohol use: Not Currently     Alcohol/week: 1.0 - 2.0 standard drink     Types: 1 - 2 Cans of beer per week     Comment: a week    Drug use: Yes     Frequency: 1.0 times per week     Types: Cocaine    Sexual activity: Yes     Partners: Male     Birth control/protection: None   Other Topics Concern    Not on file   Social History Narrative    Unemployed     Social Determinants of Health     Financial Resource Strain:     Difficulty of Paying Living Expenses: Not on file   Food Insecurity:     Worried About Running Out of Food in the Last Year: Not on file    Radha of Food in the Last Year: Not on file   Transportation Needs:     Lack of Transportation (Medical): Not on file    Lack of Transportation (Non-Medical):  Not on file   Physical Activity:     Days of Exercise per Week: Not on file    Minutes of Exercise per Session: Not on file   Stress:     Feeling of Stress : Not on file   Social Connections:     Frequency of Communication with Friends and Family: Not on file    Frequency of Social Gatherings with Friends and Family: Not on file    Attends Mu-ism Services: Not on file    Active Member of Clubs or Organizations: Not on file    Attends Club or Organization Meetings: Not on file    Marital Status: Not on file   Intimate Partner Violence:     Fear of Current or Ex-Partner: Not on file   Shun Lowe Emotionally Abused: Not on file    Physically Abused: Not on file    Sexually Abused: Not on file   Housing Stability:     Unable to Pay for Housing in the Last Year: Not on file    Number of Places Lived in the Last Year: Not on file    Unstable Housing in the Last Year: Not on file       Review of systems:     A comprehensive review of systems was negative except for that written in the History of Present Illness. Visit Vitals  BP (!) 170/110 (BP 1 Location: Right upper arm, BP Patient Position: Sitting, BP Cuff Size: Large adult)   Pulse 76   Temp (P) 98.1 °F (36.7 °C) (Oral)   Resp 24   Ht 5' 7\" (1.702 m)   Wt 192 lb 9.6 oz (87.4 kg)   SpO2 98%   BMI 30.17 kg/m²       General: Alert and oriented, in no acute distress. Well nourished. EYE: PERRL. Sclera and conjuctival clear. Extraocular movements intact. EARS: External normal, canals clear, tympanic membranes normal.   NOSE: Mucosa healthy without drainage or ulceration. OROPHARYNX: No suspicious lesions, normal dentition, pharynx, tongue and tonsils normal.  NECK: Supple; no masses; thyroid normal.  LUNGS: Respirations unlabored; clear to auscultation bilaterally. CARDIOVASCULAR: Regular, rate, and rhythm without murmurs, gallops or rubs. Neuro: CN II-XII grossly intact except as described below;   Mental Status: Pt is alert and oriented to person, place, and time. Language is mildly slurred speech but comprehensive. Right facial droop noted on exam. Capable of closing both exams  Normal sensation bilaterally  Gait is normal      Assessment/Plan       ICD-10-CM ICD-9-CM    1. Primary hypertension  I10 401.9 losartan-hydroCHLOROthiazide (HYZAAR) 100-25 mg per tablet      amLODIPine (NORVASC) 10 mg tablet   2. Facial paralysis/[place] palsy  G51.0 351.0 valACYclovir (VALTREX) 1 gram tablet      predniSONE (DELTASONE) 20 mg tablet     1.  Primary hypertension  Given history and fact that pt resumed medications without any symptoms, this is likely not related to the medications. BP have been very difficult to control and will add a 3rd agent. Also need to switch  candesartan-HCT to Hyzaar given cost.    - START losartan-hydroCHLOROthiazide (HYZAAR) 100-25 mg per tablet; Take 1 Tablet by mouth Every morning.    - START amLODIPine (NORVASC) 10 mg tablet; Take 1 Tablet by mouth every evening.    - Encouraged to continue to monitor at home    2. Facial paralysis/Adrian palsy  Likely cause given history and exam findings. Stroke ruled out with CT head and CTA head/neck. Educated about the differential diagnosis of facial nerve palsy. Also educated about expected time frame for recovery of Bell's palsy; while some patients achieve complete spontaneous recovery within 2 weeks, more than 80% recover within 3 months. - start valACYclovir (VALTREX) 1 gram tablet; Take 1 Tablet by mouth three (3) times daily for 7 days. - currently on prednisone prescribed in the ED. Once completed, will taper with predniSONE (DELTASONE) 20 mg tablet; 20mg x 3 days, then 10mg (1/2 tab) until finish, dispense 10 tablet. - We talked about stroke risk factors, signs and symptoms of stroke. To seek immediate medical attention should this happen  - Refer to neuro if persistent     Follow up: 4 weeks for BP and facial paralysis management    I have discussed the diagnosis with the patient and the intended plan as seen in the above orders. The patient has received an after-visit summary and questions were answered concerning future plans. I have discussed medication side effects and warnings with the patient as well. Informed patient to return to the office if new symptoms arise.     Signed By: Ryan Pickett MD     December 3, 2021

## 2021-12-03 NOTE — LETTER
NOTIFICATION RETURN TO WORK     12/3/2021 3:57 PM    [phone]      To Whom It May Concern:    Sanjuana Negron is currently under the care of [address] [place] [address]. She has not been feeling well for about a week but it is NOT Covid related. She will return to work on: 12/06/21    If there are questions or concerns please have the patient contact our office.         Sincerely,      Zachary Francois MD

## 2022-01-03 ENCOUNTER — VIRTUAL VISIT (OUTPATIENT)
Dept: FAMILY MEDICINE CLINIC | Age: 52
End: 2022-01-03
Payer: COMMERCIAL

## 2022-01-03 DIAGNOSIS — I10 RESISTANT HYPERTENSION: Primary | ICD-10-CM

## 2022-01-03 DIAGNOSIS — G51.0 FACIAL PALSY: ICD-10-CM

## 2022-01-03 PROCEDURE — 99213 OFFICE O/P EST LOW 20 MIN: CPT | Performed by: STUDENT IN AN ORGANIZED HEALTH CARE EDUCATION/TRAINING PROGRAM

## 2022-01-03 RX ORDER — CHLORTHALIDONE 25 MG/1
25 TABLET ORAL EVERY MORNING
Qty: 90 TABLET | Refills: 0 | Status: SHIPPED | OUTPATIENT
Start: 2022-01-03 | End: 2022-03-04 | Stop reason: SDUPTHER

## 2022-01-03 RX ORDER — LOSARTAN POTASSIUM 100 MG/1
100 TABLET ORAL EVERY MORNING
Qty: 90 TABLET | Refills: 0 | Status: SHIPPED | OUTPATIENT
Start: 2022-01-03 | End: 2022-03-04 | Stop reason: SDUPTHER

## 2022-01-03 NOTE — PROGRESS NOTES
[address] [place] [address]  [address], [address] [place] [address]  [phone]    Viola Escalante (: 1970) is a 54 y.o. female, established patient, here for evaluation of the following chief complaint(s): Facial palsy and HTN    SUBJECTIVE:    Pt would like to be evaluated for the problem(s) listed above:    Facial Palsy:  Resolved per pt following treatment     Of note, pt went to the ER on 21 for evaluation of slurred speech and facial numbness. Workup including CT head and CTA head/neck were unremarkable for stroke.     HTN:  133/88 this morning. However states that she has had multiple incidence with BP in the 150s/120s. Already on Amlodipine 10mg daily and Hyzaar 100-25mg. Tolerating her medications well without any side effects. Denies any HA, chest pain, sob, fever or chills. Review of systems:      A comprehensive review of systems was negative except for that written in the History of Present Illness. PHYSICAL EXAM:    General: alert, cooperative, no distress   Mental  status: mental status: alert, oriented to person, place, and time, normal mood, behavior, speech, dress, motor activity, and thought processes   Resp: resp: normal effort and no respiratory distress   Neuro: neuro: no gross deficits   Skin: skin: no discoloration or lesions of concern on visible areas   Due to this being a TeleHealth evaluation, many elements of the physical examination are unable to be assessed. ASSESSMENT/PLAN:      ICD-10-CM ICD-9-CM    1. Resistant hypertension  I10 401.9 chlorthalidone (HYGROTON) 25 mg tablet      losartan (COZAAR) 100 mg tablet   2. Facial palsy  G51.0 351.0      1. Resistant hypertension  BP still not at goal per pt based on average home BP readings. Given she is already on Hyzaar, will adjust to include long acting diuretics  - STOP HYZAAR  - START chlorthalidone (HYGROTON) 25 mg tablet;  Take 1 Tablet by mouth Every morning.   - START losartan (COZAAR) 100 mg tablet; Take 1 Tablet by mouth Every morning.    - Continue Amlodipine 10mg daily   - Continue to monitor at home. - Discussed lifestyle changes    2. Facial palsy  resolved    Return in about 6 weeks (around 2/14/2022), or if symptoms worsen or fail to improve. We discussed the expected course, resolution and complications of the diagnosis(es) in detail. Patient was in [place] at the time of consultation. Medication risks, benefits, costs, interactions, and alternatives were discussed as indicated. I advised her to contact the office if her condition worsens, changes or fails to improve as anticipated. She expressed understanding with the diagnosis(es) and plan. Patient understands that this encounter was a temporary measure, and the importance of further follow up and examination was emphasized. Patient verbalized understanding. Yolanda Hassan is being evaluated by a Virtual Visit (video visit) encounter to address concerns as mentioned above. A caregiver was present when appropriate. Due to this being a TeleHealth encounter (During GUJIY-88 public health emergency), evaluation of the following organ systems was limited: Vitals/Constitutional/EENT/Resp/CV/GI//MS/Neuro/Skin/Heme-Lymph-Imm. Pursuant to the emergency declaration under the [address] [place] [address] authority and the [employer] Act, this Virtual Visit was conducted with patient's (and/or legal guardian's) consent, to reduce the patient's risk of exposure to COVID-19 and provide necessary medical care. The patient (and/or legal guardian) has also been advised to contact this office for worsening conditions or problems, and seek emergency medical treatment and/or call 911 if deemed necessary.     Patient identification was verified at the start of the visit: YES    Services were provided through a video synchronous discussion virtually to substitute for in-person clinic visit. Patient was located at home and provider was located in office or at home. An electronic signature was used to authenticate this note. -- Tyrone Aguilar MD     CPT Codes 95266-02841 for Established Patients may apply to this Telehealth Visit. POS code: 18.   Modifier GT

## 2022-01-03 NOTE — PROGRESS NOTES
Chief Complaint   Patient presents with    Follow-up     4 week fu        1. Have you been to the ER, urgent care clinic since your last visit? Hospitalized since your last visit? No    2. Have you seen or consulted any other health care providers outside of the [address] [place] [address] since your last visit? Include any pap smears or colon screening.  No

## 2022-01-20 ENCOUNTER — OFFICE VISIT (OUTPATIENT)
Dept: FAMILY MEDICINE CLINIC | Age: 52
End: 2022-01-20
Payer: COMMERCIAL

## 2022-01-20 VITALS
WEIGHT: 199 LBS | HEIGHT: 67 IN | HEART RATE: 98 BPM | BODY MASS INDEX: 31.23 KG/M2 | SYSTOLIC BLOOD PRESSURE: 156 MMHG | DIASTOLIC BLOOD PRESSURE: 104 MMHG | OXYGEN SATURATION: 98 %

## 2022-01-20 DIAGNOSIS — F17.200 NICOTINE USE DISORDER: ICD-10-CM

## 2022-01-20 DIAGNOSIS — I10 RESISTANT HYPERTENSION: ICD-10-CM

## 2022-01-20 DIAGNOSIS — Z11.59 NEED FOR HEPATITIS C SCREENING TEST: ICD-10-CM

## 2022-01-20 DIAGNOSIS — R73.03 PREDIABETES: ICD-10-CM

## 2022-01-20 DIAGNOSIS — N18.30 STAGE 3 CHRONIC KIDNEY DISEASE, UNSPECIFIED WHETHER STAGE 3A OR 3B CKD (HCC): ICD-10-CM

## 2022-01-20 DIAGNOSIS — Z13.220 SCREENING CHOLESTEROL LEVEL: ICD-10-CM

## 2022-01-20 DIAGNOSIS — F32.1 CURRENT MODERATE EPISODE OF MAJOR DEPRESSIVE DISORDER WITHOUT PRIOR EPISODE (HCC): Primary | ICD-10-CM

## 2022-01-20 DIAGNOSIS — Z12.11 COLON CANCER SCREENING: ICD-10-CM

## 2022-01-20 DIAGNOSIS — Z12.31 ENCOUNTER FOR SCREENING MAMMOGRAM FOR MALIGNANT NEOPLASM OF BREAST: ICD-10-CM

## 2022-01-20 LAB — HBA1C MFR BLD HPLC: 6.3 %

## 2022-01-20 PROCEDURE — 99214 OFFICE O/P EST MOD 30 MIN: CPT | Performed by: STUDENT IN AN ORGANIZED HEALTH CARE EDUCATION/TRAINING PROGRAM

## 2022-01-20 PROCEDURE — 83036 HEMOGLOBIN GLYCOSYLATED A1C: CPT | Performed by: STUDENT IN AN ORGANIZED HEALTH CARE EDUCATION/TRAINING PROGRAM

## 2022-01-20 RX ORDER — SPIRONOLACTONE 25 MG/1
TABLET ORAL
Qty: 90 TABLET | Refills: 0 | Status: SHIPPED | OUTPATIENT
Start: 2022-01-20 | End: 2022-03-04 | Stop reason: SDUPTHER

## 2022-01-20 RX ORDER — BUPROPION HYDROCHLORIDE 150 MG/1
TABLET ORAL
Qty: 90 TABLET | Refills: 0 | Status: SHIPPED | OUTPATIENT
Start: 2022-01-20 | End: 2022-02-16

## 2022-01-20 NOTE — PROGRESS NOTES
Trent Novak is a 54 y.o. female  Chief Complaint   Patient presents with    Follow-up     BP and depression   1. Have you been to the ER, No urgent care clinic since your last visit? NO  Hospitalized since your last visit? NO    2. Have you seen or consulted any other health care providers outside of the [address] [place] [address] since your last visit? Include any pap smears or colon screening.  NO

## 2022-01-20 NOTE — PATIENT INSTRUCTIONS
DEPRESSION:    - START WELLBUTRIN 150MG DAILY. IF TOLERATED, INCREASE TO 2 TABLETS AFTER 2 WEEKS. - Will refer for counseling (Gloria Leung LCSW)  - Instructed patient to contact office or on-call physician promptly should condition worsen or any new symptoms appear.  -  IF THE PATIENT HAS ANY SUICIDAL OR HOMICIDAL IDEATION, CALL THE OFFICE, DISCUSS WITH A SUPPORT MEMBER OR GO TO THE ER IMMEDIATELY. Patient was agreeable with this plan. - The National Suicide Prevention Lifeline provides free and confidential emotional support to people in suicidal crisi or emotional distress. The services are provided 24 hours a day, 7 days a week. Please call [phone] ([phone]) if needed. BLOOD PRESSURE:  - IN THE MORNING - TAKE CHLORTHALIDONE 25 MG AND LOSARTAN 100 MG   - AT NIGHT - TAKE AMLODIPINE 25 MG AND SPIRONOLACTONE 12.5 MG (HALF TABLET, INCREASE TO FULL TABLET IF TOLERATED AFTER 1-2 WEEKS)        SMOKING CESSATION     Pick the date and pearl it on your calendar. Tell friends and family about your Quit Day. Get rid of all the cigarettes and ashtrays in your home, car, and place of work. Stock up on oral substitutes (sugarless gum, carrot sticks, hard candy, cinnamon sticks, coffee stirrers, straws, and/or toothpicks). Decide on a plan. Will you use nicotine replacement therapy (NRT) or other medicines? Will you attend a stop-smoking class? If so, sign up now. Practice saying, No thank you, I dont smoke.   Set up a support system, which could be a group program such as Nicotine Anonymous or a friend or family member who has successfully quit. Ask family and friends who still smoke not to smoke around you or leave cigarettes out where you can see them. If you are using bupropion or varenicline, take your dose each day of the week leading up to your Quit Day. Think back to your past attempts to quit. Try to figure out what worked and what did not work for you.   Download a Quit Smoking Plan and \"I Can Quit\" Log    On Your Quit Day    Do not smoke. This means none at all--not even one puff! Keep active. Try walking, exercising, or hobbies. Drink lots of water and juices. Begin using nicotine replacement if that is your choice. Attend a stop-smoking class or follow your self-help plan. Avoid situations where the urge to smoke is strong. Avoid people who are smoking. Reduce or avoid alcohol. Think about how you can change your routine. Use a different route to go to work, drink tea instead of coffee, eat breakfast in a different place, or eat different foods. Dealing with Withdrawal    Nicotine replacement and other medicines can help reduce many of the physical symptoms of withdrawal. Most smokers find that the bigger challenge is the mental part of quitting. If you have been smoking for any length of time, smoking has become linked with nearly everything you do--waking up in the morning, eating, and drinking coffee. It will take time to un-link smoking from these activities, which is why, even if you are using a nicotine replacement, you may still have strong urges to smoke.
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
## 2022-01-21 LAB
ALBUMIN SERPL-MCNC: 4.6 G/DL (ref 3.5–5)
ALBUMIN/GLOB SERPL: 1.2 {RATIO} (ref 1.1–2.2)
ALP SERPL-CCNC: 83 U/L (ref 45–117)
ALT SERPL-CCNC: 57 U/L (ref 12–78)
ANION GAP SERPL CALC-SCNC: 4 MMOL/L (ref 5–15)
AST SERPL-CCNC: 27 U/L (ref 15–37)
BILIRUB SERPL-MCNC: 0.5 MG/DL (ref 0.2–1)
BUN SERPL-MCNC: 16 MG/DL (ref 6–20)
BUN/CREAT SERPL: 12 (ref 12–20)
CALCIUM SERPL-MCNC: 9.9 MG/DL (ref 8.5–10.1)
CHLORIDE SERPL-SCNC: 106 MMOL/L (ref 97–108)
CHOLEST SERPL-MCNC: 160 MG/DL
CO2 SERPL-SCNC: 30 MMOL/L (ref 21–32)
CREAT SERPL-MCNC: 1.3 MG/DL (ref 0.55–1.02)
GLOBULIN SER CALC-MCNC: 3.8 G/DL (ref 2–4)
GLUCOSE SERPL-MCNC: 116 MG/DL (ref 65–100)
HCV AB SERPL QL IA: NONREACTIVE
HDLC SERPL-MCNC: 51 MG/DL
HDLC SERPL: 3.1 {RATIO} (ref 0–5)
LDLC SERPL CALC-MCNC: 63 MG/DL (ref 0–100)
POTASSIUM SERPL-SCNC: 4.1 MMOL/L (ref 3.5–5.1)
PROT SERPL-MCNC: 8.4 G/DL (ref 6.4–8.2)
SODIUM SERPL-SCNC: 140 MMOL/L (ref 136–145)
TRIGL SERPL-MCNC: 230 MG/DL (ref ?–150)
TSH SERPL DL<=0.05 MIU/L-ACNC: 0.95 UIU/ML (ref 0.36–3.74)
VLDLC SERPL CALC-MCNC: 46 MG/DL

## 2022-01-24 ENCOUNTER — TELEPHONE (OUTPATIENT)
Dept: FAMILY MEDICINE CLINIC | Age: 52
End: 2022-01-24

## 2022-01-24 NOTE — LETTER
NOTIFICATION RETURN TO WORK / SCHOOL    1/25/2022 2:09 PM    Ms. Maria De Jesus Oswald  [address] [place] [address],[address] [id number]      To Whom It May Concern:    Maria De Jesus Oswald is currently under the care of [facility]. She will return to work/school on: 2/2/2022    If there are questions or concerns please have the patient contact our office.         Sincerely,      Nannette Chand MD

## 2022-01-24 NOTE — TELEPHONE ENCOUNTER
Patient needs a note to return to work. 1/19-2/2. Please call @[phone]. She needs it right away.

## 2022-02-03 ENCOUNTER — VIRTUAL VISIT (OUTPATIENT)
Dept: BEHAVIORAL/MENTAL HEALTH CLINIC | Age: 52
End: 2022-02-03
Payer: COMMERCIAL

## 2022-02-03 DIAGNOSIS — F43.23 ADJUSTMENT DISORDER WITH MIXED ANXIETY AND DEPRESSED MOOD: Primary | ICD-10-CM

## 2022-02-03 DIAGNOSIS — F43.21 GRIEF: ICD-10-CM

## 2022-02-03 PROCEDURE — 90791 PSYCH DIAGNOSTIC EVALUATION: CPT | Performed by: SOCIAL WORKER
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
## 2022-02-10 ENCOUNTER — VIRTUAL VISIT (OUTPATIENT)
Dept: BEHAVIORAL/MENTAL HEALTH CLINIC | Age: 52
End: 2022-02-10
Payer: COMMERCIAL

## 2022-02-10 DIAGNOSIS — F43.23 ADJUSTMENT DISORDER WITH MIXED ANXIETY AND DEPRESSED MOOD: Primary | ICD-10-CM

## 2022-02-10 DIAGNOSIS — F43.21 GRIEF: ICD-10-CM

## 2022-02-10 PROCEDURE — 90834 PSYTX W PT 45 MINUTES: CPT | Performed by: SOCIAL WORKER

## 2022-02-10 NOTE — PROGRESS NOTES
PSYCHOTHERAPY NOTE      Anamaria Casas is a 54 y.o. female who presents with anxiety/depression. Client was seen for a virtual individual psychotherapy session that lasted for 50 minutes. Progress:  Client reports mood to be improving. She states that she feels less depressed and less anxious. She feels that the medication has been helpful and she is compliant. She also feels that she has been able to be better focused at work     Client shared that she has had some challenges with her son's grandmother who son presently lives with. She states that her relationship with grandmother is challenging and she feels easily triggered by her. We processed all of this in session and explored ways to promote better communication. Reminded client of what is and what is not within her control and ways to make positive changes. Also made some recommendations of book that can help with their communication. Client is doing the gratitude journal nightly. Suggested she also write one thing about son's grandmother that she is grateful for to also help reframe. Encouraged client to consider grief support groups.  She was provided with information on local groups    Mental Status exam:         Sensorium  oriented to time, place and person   Relations cooperative   Appearance:  casually dressed   Motor Behavior:  within normal limits   Speech:  normal pitch and normal volume   Thought Process: goal directed and logical   Thought Content free of delusions and free of hallucinations   Suicidal ideations none   Homicidal ideations none   Mood:  euthymic   Affect:  euthymic   Memory recent  adequate   Memory remote:  adequate   Concentration:  adequate   Abstraction:  abstract   Insight:  fair   Reliability fair   Judgment:  fair         DIAGNOSIS AND IMPRESSION:    Axis I: Adjustment Disorder with Mixed Emotional Features and Bereavement  Axis II: Deferred    Axis III:   Past Medical History:   Diagnosis Date    Anemia 2/18/2012    [facility]aine [facility]     Essential hypertension     Hypertension     SVT (supraventricular tachycardia) ([facility] 11/16/2018     Axis IV: Problems with primary support group, Problems related to social environment and Other psychosocial or environmental problems  Axis V:  61-70 mild symptoms    Clinical assignments: Add son's grandmother to gratitude journal    Interventions/plans: Follow up in 2-3 weeks      Pursuant to the emergency declaration under the [address] [place] [address], [facility] waiver authority and the [employer] Act, this Virtual Visit was conducted, with patient and parent and/or guardian's consent, to reduce the patient's risk of exposure to COVID-19 and provide continuity of care for an established patient. Due to the state of emergency related to the coronavirus, the [place] [employer] [place] and the [place] [employer] is allowing practitioners holding my licensure and/or certification status the ability to provide telehealth services without the usual requirements.

## 2022-02-15 DIAGNOSIS — F17.200 NICOTINE USE DISORDER: ICD-10-CM

## 2022-02-15 DIAGNOSIS — F32.1 CURRENT MODERATE EPISODE OF MAJOR DEPRESSIVE DISORDER WITHOUT PRIOR EPISODE (HCC): ICD-10-CM

## 2022-02-16 RX ORDER — BUPROPION HYDROCHLORIDE 150 MG/1
TABLET ORAL
Qty: 60 TABLET | Refills: 0 | Status: SHIPPED | OUTPATIENT
Start: 2022-02-16 | End: 2022-03-04 | Stop reason: SDUPTHER

## 2022-03-01 ENCOUNTER — VIRTUAL VISIT (OUTPATIENT)
Dept: BEHAVIORAL/MENTAL HEALTH CLINIC | Age: 52
End: 2022-03-01
Payer: COMMERCIAL

## 2022-03-01 DIAGNOSIS — F43.21 GRIEF: ICD-10-CM

## 2022-03-01 DIAGNOSIS — F43.23 ADJUSTMENT DISORDER WITH MIXED ANXIETY AND DEPRESSED MOOD: Primary | ICD-10-CM

## 2022-03-01 PROCEDURE — 90834 PSYTX W PT 45 MINUTES: CPT | Performed by: SOCIAL WORKER

## 2022-03-01 NOTE — PROGRESS NOTES
PSYCHOTHERAPY NOTE      María Kemp is a 54 y.o. female who presents with anxiety/depression. Client was seen for a virtual/telephonic individual psychotherapy session that lasted for 50 minutes. Progress:  Mood is noted to be frustrated due to being laid off from work. Client found this out yesterday and is still trying to process it all. She states it is due to her missing so much work. She states that since starting her antidepressant and engaging in therapy, she has been doing better and has not missed work and has been more productive when she is there. She is frustrated as she feels that when she was missing work it was due to her depression and grief issues. Processed this in session. Encouraged her to begin applying for other jobs. She is a little discouraged as it took her a while to find this one. Encouraged her to \"throw her net wide\" with job search and discussed possible opportunities. Despite job loss, client reports mood to continue to improve. She tries to re-frame negative cognitions and works on reminding herself of reasons to be grateful. Client has had limited contact with son as he is not as responsive via texts. This is very age specific behavior and we also addressed this in session and ways to promote better communication.      Mental Status exam:         Sensorium  oriented to time, place and person   Relations cooperative   Appearance:  casually dressed   Motor Behavior:  within normal limits   Speech:  normal pitch and normal volume   Thought Process: goal directed and logical   Thought Content free of delusions and free of hallucinations   Suicidal ideations none   Homicidal ideations none   Mood:  frustrated   Affect:  mood-congruent   Memory recent  adequate   Memory remote:  adequate   Concentration:  adequate   Abstraction:  abstract   Insight:  fair   Reliability fair   Judgment:  fair         DIAGNOSIS AND IMPRESSION:      Axis I: Adjustment Disorder with Mixed Emotional Features and Bereavement  Axis II: Deferred  Axis III:   Past Medical History:   Diagnosis Date    Anemia 2/18/2012    [facility] ([facility]     Essential hypertension     Hypertension     SVT (supraventricular tachycardia) ([facility] 11/16/2018     Axis IV: Problems with primary support group, Problems related to social environment, Problems with access to health care services and Other psychosocial or environmental problems  Axis V:  61-70 mild symptoms    Interventions/plans: Follow up in 3-4 weeks      Pursuant to the emergency declaration under the [facility], [facility] waiver authority and the [employer] Act, this Virtual Visit was conducted, with patient and parent and/or guardian's consent, to reduce the patient's risk of exposure to COVID-19 and provide continuity of care for an established patient. Due to the state of emergency related to the coronavirus, the [place] [employer] [place] and the [place] [employer] is allowing practitioners holding my licensure and/or certification status the ability to provide telehealth services without the usual requirements.

## 2022-03-04 ENCOUNTER — OFFICE VISIT (OUTPATIENT)
Dept: FAMILY MEDICINE CLINIC | Age: 52
End: 2022-03-04
Payer: COMMERCIAL

## 2022-03-04 VITALS
TEMPERATURE: 98.1 F | BODY MASS INDEX: 29.51 KG/M2 | DIASTOLIC BLOOD PRESSURE: 89 MMHG | RESPIRATION RATE: 16 BRPM | WEIGHT: 188 LBS | HEIGHT: 67 IN | SYSTOLIC BLOOD PRESSURE: 126 MMHG | HEART RATE: 73 BPM | OXYGEN SATURATION: 99 %

## 2022-03-04 DIAGNOSIS — F17.200 NICOTINE USE DISORDER: ICD-10-CM

## 2022-03-04 DIAGNOSIS — F32.1 CURRENT MODERATE EPISODE OF MAJOR DEPRESSIVE DISORDER WITHOUT PRIOR EPISODE (HCC): Primary | ICD-10-CM

## 2022-03-04 DIAGNOSIS — I10 RESISTANT HYPERTENSION: ICD-10-CM

## 2022-03-04 PROCEDURE — 99214 OFFICE O/P EST MOD 30 MIN: CPT | Performed by: STUDENT IN AN ORGANIZED HEALTH CARE EDUCATION/TRAINING PROGRAM

## 2022-03-04 RX ORDER — SPIRONOLACTONE 25 MG/1
25 TABLET ORAL DAILY
Qty: 90 TABLET | Refills: 3 | Status: SHIPPED | OUTPATIENT
Start: 2022-03-04

## 2022-03-04 RX ORDER — CHLORTHALIDONE 25 MG/1
25 TABLET ORAL EVERY MORNING
Qty: 90 TABLET | Refills: 3 | Status: SHIPPED | OUTPATIENT
Start: 2022-03-04

## 2022-03-04 RX ORDER — LOSARTAN POTASSIUM 100 MG/1
100 TABLET ORAL EVERY MORNING
Qty: 90 TABLET | Refills: 3 | Status: SHIPPED | OUTPATIENT
Start: 2022-03-04

## 2022-03-04 RX ORDER — AMLODIPINE BESYLATE 10 MG/1
10 TABLET ORAL EVERY EVENING
Qty: 90 TABLET | Refills: 3 | Status: SHIPPED | OUTPATIENT
Start: 2022-03-04

## 2022-03-04 RX ORDER — BUPROPION HYDROCHLORIDE 150 MG/1
TABLET ORAL
Qty: 180 TABLET | Refills: 3 | Status: SHIPPED | OUTPATIENT
Start: 2022-03-04

## 2022-03-04 NOTE — PROGRESS NOTES
[address]  [address] [place], [address]  [phone]    Chief Complaint: Depression/anxiety and HTN    Mira Sanchez is a 54 y.o. female who presents for  Depression/anxiety and HTN management. Depression and Anxiety:  She is currently on Wellbutrin 150mg BID and states that she is doing great. States that she feels like a different person.  -Any suicidal ideation: no  -Any homicidal ideation: no    HTN:  Previously difficult to control. Pt has been very complaint and states that her BP looks great at home. On Chlorthalidone 25mg, Losartan 100mg, Amlodipine 10mg and aldactone 25 mg daily  She has worked hard on food and cut out unhealthy stuffs. Lost 10 lbs since our last visit. Now has much energy. States that the unhealthy eating was due to her depression.     Pt denies any  fever, chill, night sweats, chest pain, pressure, SOB. Intermittent ZAPATA due to her COPD. Social History  Living situation: Lives with her mom. Relationship status:single. Has 3 children; 32, 25 and 11 yo. Non of the kids live with her  Smoking - 1 ppd  Legal issues: no  Occupation: previously working at [employer] but lost her job. Looking for another job  Trauma: Youngest child's father  and it was hard on her. Allergies - reviewed: Allergies   Allergen Reactions    Hydromorphone Itching     morphine    Morphine Itching    Sulfa (Sulfonamide Antibiotics) Nausea and Vomiting    Hydralazine Rash and Unknown (comments)     jitteriness           Medications - reviewed:   Current Outpatient Medications   Medication Sig    buPROPion XL (WELLBUTRIN XL) 150 mg tablet TAKE ONE TABLET BY MOUTH TWO TIMES DAILY    spironolactone (ALDACTONE) 25 mg tablet Take 1 Tablet by mouth daily.  chlorthalidone (HYGROTON) 25 mg tablet Take 1 Tablet by mouth Every morning.  losartan (COZAAR) 100 mg tablet Take 1 Tablet by mouth Every morning.  amLODIPine (NORVASC) 10 mg tablet Take 1 Tablet by mouth every evening.  albuterol (PROVENTIL HFA, VENTOLIN HFA, PROAIR HFA) 90 mcg/actuation inhaler Take 2 Puffs by inhalation every six (6) hours as needed for Wheezing.  Symbicort 160-4.5 mcg/actuation HFAA INHALE TWO PUFFS BY MOUTH TWICE A DAY     No current facility-administered medications for this visit. Past Medical History - reviewed:  Past Medical History:   Diagnosis Date    Anemia 2012    Cocaine abuse affecting pregnancy ([facility]     Essential hypertension     Hypertension     SVT (supraventricular tachycardia) ([facility] 2018         Past Surgical History - reviewed:   Past Surgical History:   Procedure Laterality Date    HX  SECTION      HX CHOLECYSTECTOMY           Social History - reviewed:  Social History     Socioeconomic History    Marital status: LEGALLY      Spouse name: Not on file    Number of children: Not on file    Years of education: Not on file    Highest education level: Not on file   Occupational History    Occupation: Unemployed   Tobacco Use    Smoking status: Current Every Day Smoker     Packs/day: 1.00    Smokeless tobacco: Never Used   Vaping Use    Vaping Use: Never used   Substance and Sexual Activity    Alcohol use: Not Currently     Alcohol/week: 1.0 - 2.0 standard drink     Types: 1 - 2 Cans of beer per week     Comment: a week    Drug use: Yes     Frequency: 1.0 times per week     Types: Cocaine    Sexual activity: Yes     Partners: Male     Birth control/protection: None   Other Topics Concern    Not on file   Social History Narrative    Unemployed     Social Determinants of Health     Financial Resource Strain:     Difficulty of Paying Living Expenses: Not on file   Food Insecurity:     Worried About Running Out of Food in the Last Year: Not on file    Radha of Food in the Last Year: Not on file   Transportation Needs:     Lack of Transportation (Medical):  Not on file    Lack of Transportation (Non-Medical): Not on file   Physical Activity:     Days of Exercise per Week: Not on file    Minutes of Exercise per Session: Not on file   Stress:     Feeling of Stress : Not on file   Social Connections:     Frequency of Communication with Friends and Family: Not on file    Frequency of Social Gatherings with Friends and Family: Not on file    Attends Roman Catholic Services: Not on file    Active Member of [address] [place] [address] or Organizations: Not on file    Attends Club or Organization Meetings: Not on file    Marital Status: Not on file   Intimate Partner Violence:     Fear of Current or Ex-Partner: Not on file    Emotionally Abused: Not on file    Physically Abused: Not on file    Sexually Abused: Not on file   Housing Stability:     Unable to Pay for Housing in the Last Year: Not on file    Number of [place] in the Last Year: Not on file    Unstable Housing in the Last Year: Not on file         Family History - reviewed:  Family History   Problem Relation Age of Onset    Hypertension Father     Alcohol abuse Father     Breast Cancer Sister     Cancer Brother         Throat versus Thyroid    No Known Problems Son     No Known Problems Son     No Known Problems Daughter          Review of systems:    A comprehensive review of systems was negative except for that written in the History of Present Illness.        Physical Exam  Visit Vitals  /89 (BP 1 Location: Right arm, BP Patient Position: Sitting, BP Cuff Size: Adult)   Pulse 73   Temp 98.1 °F (36.7 °C) (Oral)   Resp 16   Ht 5' 7\" (1.702 m)   Wt 188 lb (85.3 kg)   SpO2 99%   BMI 29.44 kg/m²       General appearance - alert, well appearing, well groomed  Eyes - pupils equal and reactive, extraocular eye movements intact  Chest - clear to auscultation, no wheezes, rales or rhonchi, symmetric air entry  Heart - normal rate, regular rhythm, normal S1, S2, no murmurs, rubs, clicks or gallops  Neurological - alert, oriented, no focal findings or movement disorder noted    MENTAL STATUS EXAMINATION:   Patient appears stated age. Patient appearance is nicely dressed with good hygiene. Speech is regular rate and rhythm, fluent language, and thought process is linear, logical, and goal directed. Reported mood is \"great\" . This was congruent with the topics we were discussing and pt complaints. Patient denies any suicidal ideation, homicidal ideation,manic symptoms and auditory visual hallucination. Not observed to be delusional or paranoid. Insight, judgement and impulse control is good. Cognition was within normal limit and patient was observed to be reliable. 3 most recent PHQ Screens 1/20/2022   PHQ Not Done -   Little interest or pleasure in doing things Several days   Feeling down, depressed, irritable, or hopeless More than half the days   Total Score PHQ 2 3   Trouble falling or staying asleep, or sleeping too much Several days   Feeling tired or having little energy More than half the days   Poor appetite, weight loss, or overeating Several days   Feeling bad about yourself - or that you are a failure or have let yourself or your family down Several days   Trouble concentrating on things such as school, work, reading, or watching TV Several days   Moving or speaking so slowly that other people could have noticed; or the opposite being so fidgety that others notice Several days   Thoughts of being better off dead, or hurting yourself in some way Several days   PHQ 9 Score 11   How difficult have these problems made it for you to do your work, take care of your home and get along with others -         Assessment/Plan    ICD-10-CM ICD-9-CM    1. Current moderate episode of major depressive disorder without prior episode ([facility])  F32.1 296.22 buPROPion XL (WELLBUTRIN XL) 150 mg tablet   2.  Resistant hypertension  I10 401.9 spironolactone (ALDACTONE) 25 mg tablet      chlorthalidone (HYGROTON) 25 mg tablet      losartan (COZAAR) 100 mg tablet      amLODIPine (NORVASC) 10 mg tablet   3. Nicotine use disorder  F17.200 305.1 buPROPion XL (WELLBUTRIN XL) 150 mg tablet     1. Current moderate episode of major depressive disorder without prior episode ([address]  States that she is feeling amazing. Has been compliant and denies any side effects. No SI/HI    - buPROPion XL (WELLBUTRIN XL) 150 mg tablet; TAKE ONE TABLET BY MOUTH TWO TIMES DAILY   - Instructed patient to contact office or on-call physician promptly should condition worsen or any new symptoms appear and provided on-call telephone numbers. IF THE PATIENT HAS ANY SUICIDAL OR HOMICIDAL IDEATION, CALL THE OFFICE, DISCUSS WITH A SUPPORT MEMBER OR GO TO THE ER IMMEDIATELY. Patient was agreeable with this plan. - The National Suicide Prevention Lifeline provides free and confidential emotional support to people in suicidal crisi or emotional distress. The services are provided 24 hours a day, 7 days a week. Please call 6-275-275-IWIB ([phone]) if needed. 2. Resistant hypertension  Now at goal. Will continue therapy  - spironolactone (ALDACTONE) 25 mg tablet; Take 1 Tablet by mouth daily. - chlorthalidone (HYGROTON) 25 mg tablet; Take 1 Tablet by mouth Every morning.  - losartan (COZAAR) 100 mg tablet; Take 1 Tablet by mouth Every morning.    - amLODIPine (NORVASC) 10 mg tablet; Take 1 Tablet by mouth every evening. 3. Nicotine use disorder  Still smoking. Advise pt to quit. - buPROPion XL (WELLBUTRIN XL) 150 mg tablet; TAKE ONE TABLET BY MOUTH TWO TIMES DAILY      9. Nicotine use disorder  -Encouraged pt to quit  - buPROPion XL (WELLBUTRIN XL) 150 mg tablet; TAKE ONE TABLET DAILY. IF TOLERATED AFTER 2 WEEKS, INCREASE TO 2 TABLETS DAILY. MAX 300MG/DAY     Follow up: 3 months or sooner if needed    We discussed the expected course, resolution and complications of the diagnosis(es) in detail.   Medication risks, benefits, costs, interactions, and alternatives were discussed as indicated. I advised to contact the office if his condition worsens, changes or fails to improve as anticipated. Pt expressed understanding with the diagnosis(es) and plan. Patient understands that this encounter was a temporary measure, and the importance of further follow up and examination was emphasized. Patient verbalized understanding.       Signed By: Awais Franco MD     March 4, 2022

## 2022-03-04 NOTE — PROGRESS NOTES
Name and  Verified. Pharmacy verified    Chief Complaint   Patient presents with    Follow-up     6 weeks 2022/ Depression       1. Have you been to the ER, urgent care clinic since your last visit? Hospitalized since your last visit? No    2. Have you seen or consulted any other health care providers outside of the [address] [place] [address] since your last visit? Include any pap smears or colon screening. No    Health Maintenance Due   Topic Date Due    Pneumococcal 0-64 years (1 of 2 - PPSV23) Never done    Cervical cancer screen  Never done    Colorectal Cancer Screening Combo  Never done    Breast Cancer Screen Mammogram  2020     Has [place] at home. Will send in when completed.     Mammogram scheduled for 3/9/2022 [facility]

## 2022-03-09 ENCOUNTER — HOSPITAL ENCOUNTER (OUTPATIENT)
Dept: MAMMOGRAPHY | Age: 52
Discharge: HOME | End: 2022-03-09
Attending: STUDENT IN AN ORGANIZED HEALTH CARE EDUCATION/TRAINING PROGRAM
Payer: COMMERCIAL

## 2022-03-09 DIAGNOSIS — Z12.31 ENCOUNTER FOR SCREENING MAMMOGRAM FOR MALIGNANT NEOPLASM OF BREAST: ICD-10-CM

## 2022-03-09 PROCEDURE — 77067 SCR MAMMO BI INCL CAD: CPT

## 2022-03-18 PROBLEM — Z72.0 TOBACCO ABUSE: Status: ACTIVE | Noted: 2019-06-21

## 2022-03-19 PROBLEM — I47.10 SVT (SUPRAVENTRICULAR TACHYCARDIA): Status: ACTIVE | Noted: 2018-11-16

## 2022-03-19 PROBLEM — R73.03 PREDIABETES: Status: ACTIVE | Noted: 2022-01-20

## 2022-03-19 PROBLEM — F14.11 COCAINE ABUSE IN REMISSION (HCC): Status: ACTIVE | Noted: 2019-09-06

## 2022-03-19 PROBLEM — I47.1 SVT (SUPRAVENTRICULAR TACHYCARDIA) (HCC): Status: ACTIVE | Noted: 2018-11-16

## 2022-03-25 ENCOUNTER — VIRTUAL VISIT (OUTPATIENT)
Dept: FAMILY MEDICINE CLINIC | Age: 52
End: 2022-03-25
Payer: COMMERCIAL

## 2022-03-25 DIAGNOSIS — J02.9 SORE THROAT: Primary | ICD-10-CM

## 2022-03-25 DIAGNOSIS — J44.9 CHRONIC OBSTRUCTIVE PULMONARY DISEASE, UNSPECIFIED COPD TYPE (HCC): ICD-10-CM

## 2022-03-25 PROCEDURE — 99213 OFFICE O/P EST LOW 20 MIN: CPT | Performed by: STUDENT IN AN ORGANIZED HEALTH CARE EDUCATION/TRAINING PROGRAM

## 2022-03-25 RX ORDER — BENZONATATE 200 MG/1
200 CAPSULE ORAL
Qty: 20 CAPSULE | Refills: 0 | Status: SHIPPED | OUTPATIENT
Start: 2022-03-25 | End: 2022-04-04

## 2022-03-25 RX ORDER — AMOXICILLIN 500 MG/1
500 CAPSULE ORAL 2 TIMES DAILY
Qty: 20 CAPSULE | Refills: 0 | Status: SHIPPED | OUTPATIENT
Start: 2022-03-25 | End: 2022-04-04

## 2022-03-25 NOTE — PROGRESS NOTES
[address] [address] [address] [place], [address] [place] [address]  [phone]    Martha Sen (: 1970) is a 54 y.o. female, established patient, here for evaluation of the following chief complaint(s): Sore throat     SUBJECTIVE:    Pt would like to be evaluated for the problem(s) listed above:    Sore throat x 1 week and worsening. Throat appears red with associated lymphadenopathy per patient. Reports minimal cough. No fever, chills, chest pain or sob. No ear pain. Review of systems:     A comprehensive review of systems was negative except for that written in the History of Present Illness. PHYSICAL EXAM:    General: alert, cooperative, no distress   Mental  status: mental status: alert, oriented to person, place, and time, normal mood, behavior, speech, dress, motor activity, and thought processes   Resp: resp: normal effort and no respiratory distress   Neuro: neuro: no gross deficits   Skin: skin: no discoloration or lesions of concern on visible areas   Due to this being a TeleHealth evaluation, many elements of the physical examination are unable to be assessed. ASSESSMENT/PLAN:      ICD-10-CM ICD-9-CM    1. Sore throat  J02.9 462 amoxicillin (AMOXIL) 500 mg capsule      benzonatate (TESSALON) 200 mg capsule     1. Sore throat  Will treat based on symptoms    - amoxicillin (AMOXIL) 500 mg capsule; Take 1 Capsule by mouth two (2) times a day for 10 days. 0  - benzonatate (TESSALON) 200 mg capsule; Take 1 Capsule by mouth two (2) times daily as needed for Cough for up to 10 days.    - RTC if worsening or failure to improve    Return if symptoms worsen or fail to improve. We discussed the expected course, resolution and complications of the diagnosis(es) in detail. Patient was in [place] at the time of consultation. Medication risks, benefits, costs, interactions, and alternatives were discussed as indicated.   I advised her to contact the office if her condition worsens, changes or fails to improve as anticipated. She expressed understanding with the diagnosis(es) and plan. Patient understands that this encounter was a temporary measure, and the importance of further follow up and examination was emphasized. Patient verbalized understanding. Destiny Mcfarland is being evaluated by a Virtual Visit (video visit) encounter to address concerns as mentioned above. A caregiver was present when appropriate. Due to this being a TeleHealth encounter (During KJAY-31 public health emergency), evaluation of the following organ systems was limited: Vitals/Constitutional/EENT/Resp/CV/GI//MS/Neuro/Skin/Heme-Lymph-Imm. Pursuant to the emergency declaration under the [address] [place] [address], [address] [place] [address] authority and the [employer] Act, this Virtual Visit was conducted with patient's (and/or legal guardian's) consent, to reduce the patient's risk of exposure to COVID-19 and provide necessary medical care. The patient (and/or legal guardian) has also been advised to contact this office for worsening conditions or problems, and seek emergency medical treatment and/or call 911 if deemed necessary. Patient identification was verified at the start of the visit: YES    Services were provided through a video synchronous discussion virtually to substitute for in-person clinic visit. Patient was located at home and provider was located in office or at home. An electronic signature was used to authenticate this note. -- Oswaldo Menodza MD     CPT Codes [address]5445-27406 for Established Patients may apply to this Telehealth Visit. POS code: 18.   Modifier GT

## 2022-03-28 RX ORDER — BUDESONIDE AND FORMOTEROL FUMARATE DIHYDRATE 160; 4.5 UG/1; UG/1
AEROSOL RESPIRATORY (INHALATION)
Qty: 30.6 G | OUTPATIENT
Start: 2022-03-28

## 2022-03-29 ENCOUNTER — VIRTUAL VISIT (OUTPATIENT)
Dept: BEHAVIORAL/MENTAL HEALTH CLINIC | Age: 52
End: 2022-03-29
Payer: COMMERCIAL

## 2022-03-29 DIAGNOSIS — F43.23 ADJUSTMENT DISORDER WITH MIXED ANXIETY AND DEPRESSED MOOD: Primary | ICD-10-CM

## 2022-03-29 DIAGNOSIS — F43.21 GRIEF: ICD-10-CM

## 2022-03-29 PROCEDURE — 90834 PSYTX W PT 45 MINUTES: CPT | Performed by: SOCIAL WORKER

## 2022-03-29 NOTE — PROGRESS NOTES
PSYCHOTHERAPY NOTE      María Kemp is a 54 y.o. female who presents with anxiety/depression. Client was seen for a virtual individual psychotherapy session that lasted for 50 minutes. Progress:  Client reports mood to have deteriorated since last session. She reports that she has been compliant with her medications, but mood has not improved. Suggested she reach out to PCP to address and this writer will also reach out to him. Client denies thoughts of self harm. She denies any relapse. She reports having lack of energy, lack of motivation, sleeping a lot and anhedonia. Processed this in session. It is felt that mood has deteriorated since she lost her job and now has a lack of daily structure. She has not found another job and states she has plans to go and help her daughter when her baby is born due in April. In the meantime, her mother has agreed to help pay for her bills. We did discuss possibility of client still exploring part time work or volunteerism to help promote daily structure. Client also stressed about issues with son who has been missing school. He stays with paternal grandmother in order to attend another school system. Grandmother is now seeking \"kinship care\" and this is upsetting to client. Also processed this in session today. Explored ways to help client manage this stressor. Today we focused on increasing coping strategies.      Mental Status exam:         Sensorium  oriented to time, place and person   Relations cooperative   Appearance:  N/A telephonic   Motor Behavior:  N/A telephonic   Speech:  normal pitch and normal volume   Thought Process: goal directed and logical   Thought Content free of delusions and free of hallucinations   Suicidal ideations none   Homicidal ideations none   Mood:  depressed   Affect:  depressed   Memory recent  adequate   Memory remote:  adequate   Concentration:  adequate   Abstraction:  abstract   Insight:  fair   Reliability fair   Judgment:  fair         DIAGNOSIS AND IMPRESSION:    Axis I: Adjustment Disorder with Mixed Emotional Features and Bereavement  Axis II: Deferred    Axis III:   Past Medical History:   Diagnosis Date    Anemia 2/18/2012    [facility]aine abuse affecting [facility] ([facility]     Essential hypertension     Hypertension     SVT (supraventricular tachycardia) ([facility] 11/16/2018     Axis IV: Problems with primary support group, Problems related to social environment, Occupational problems, Economic problems, Problems with access to health care services and Other psychosocial or environmental problems  Axis V:  51-60 moderate symptoms    Interventions/plans: Follow up in 2-3 weeks      Pursuant to the emergency declaration under the [facility] waiver authority and the [employer] Act, this Virtual Visit was conducted, with patient and parent and/or guardian's consent, to reduce the patient's risk of exposure to COVID-19 and provide continuity of care for an established patient. Due to the state of emergency related to the coronavirus, the [place] [employer] and the [place] [employer] is allowing practitioners holding my licensure and/or certification status the ability to provide telehealth services without the usual requirements.

## 2022-04-05 NOTE — TELEPHONE ENCOUNTER
Symbicort 160-4.5 mcg/actuation HFAA        Sig: INHALE TWO PUFFS BY MOUTH TWICE A DAY     Last filled: 10/19/2020 By Dr. Geovani Negro    Last Seen: VV 3/25/2022

## 2022-04-05 NOTE — TELEPHONE ENCOUNTER
----- Message from Altagracia Perez sent at 4/5/2022 10:55 AM EDT -----  Subject: Message to Provider    QUESTIONS  Information for Provider? patient needs  to call in / fill in her   inhaler Simbacor   ---------------------------------------------------------------------------  --------------  [address]  What is the best way for the office to contact you? OK to leave message on   voicemail  Preferred Call Back Phone Number? [phone]  ---------------------------------------------------------------------------  --------------  SCRIPT ANSWERS  Relationship to Patient?  Self

## 2022-04-06 RX ORDER — BUDESONIDE AND FORMOTEROL FUMARATE DIHYDRATE 160; 4.5 UG/1; UG/1
AEROSOL RESPIRATORY (INHALATION)
Qty: 3 EACH | Refills: 3 | Status: SHIPPED | OUTPATIENT
Start: 2022-04-06 | End: 2022-08-17 | Stop reason: SDUPTHER

## 2022-04-08 ENCOUNTER — OFFICE VISIT (OUTPATIENT)
Dept: FAMILY MEDICINE CLINIC | Age: 52
End: 2022-04-08
Payer: COMMERCIAL

## 2022-04-08 VITALS
HEART RATE: 80 BPM | OXYGEN SATURATION: 96 % | BODY MASS INDEX: 30.13 KG/M2 | DIASTOLIC BLOOD PRESSURE: 92 MMHG | SYSTOLIC BLOOD PRESSURE: 127 MMHG | RESPIRATION RATE: 16 BRPM | TEMPERATURE: 98 F | WEIGHT: 192 LBS | HEIGHT: 67 IN

## 2022-04-08 DIAGNOSIS — M25.511 ACUTE PAIN OF RIGHT SHOULDER: Primary | ICD-10-CM

## 2022-04-08 PROCEDURE — 99213 OFFICE O/P EST LOW 20 MIN: CPT | Performed by: STUDENT IN AN ORGANIZED HEALTH CARE EDUCATION/TRAINING PROGRAM

## 2022-04-08 NOTE — PROGRESS NOTES
Name and  Verified. Pharmacy verified    Chief Complaint   Patient presents with    Shoulder Pain     Right x 1 week     Using Motrin 800 mg, Tylenol, Aspire cream.  Denies any injury. Pain scale 5 out of 10. Pin keeping her up at night. 1. Have you been to the ER, urgent care clinic since your last visit? Hospitalized since your last visit? No    2. Have you seen or consulted any other health care providers outside of the [address] [place] [address] since your last visit? Include any pap smears or colon screening. No      Health Maintenance Due   Topic Date Due    Pneumococcal 0-64 years (1 of 2 - PPSV23) Never done    Cervical cancer screen  Never done    Colorectal Cancer Screening Combo  Never done    Shingrix Vaccine Age 50> (1 of 2) Never done     Hs not received any COVID.

## 2022-04-08 NOTE — PROGRESS NOTES
[address]  [address]  [phone]    Chief Complaint: Right shoulder pain    Subjective  Jeffrey Horne is a 50 y.o. WHITE/NON- female , established patient, here for evaluation of the concern(s) above; Shoulder pain, right:    Pt presents for evaluation of right shoulder pain which has been ongoing for about a wee. Pt reports anterior right shoulder pain. Better with rest and worsen by activities such as pulling or reaching overhead. The symptoms also aggravated by palpation. The pain is at a severity of 5/10 when at its worst. Associated symptoms include limited range of motion. Pertinent negatives include No numbness, no stiffness, no tingling, no itching, no back pain and no neck pain. There has been no history of extremity trauma. Tried Ibuprofen 800mg and Aspercreme today which helped with sypmtoms      Allergies - reviewed:    Allergies   Allergen Reactions    Hydromorphone Itching     morphine    Morphine Itching    Sulfa (Sulfonamide Antibiotics) Nausea and Vomiting    Hydralazine Rash and Unknown (comments)     jitteriness         Past Medical History - reviewed:  Past Medical History:   Diagnosis Date    Anemia 2/18/2012    [facility]     Essential hypertension     Hypertension     SVT (supraventricular tachycardia) ([facility] 11/16/2018       Depression screening:  3 most recent PHQ Screens 1/20/2022   PHQ Not Done -   Little interest or pleasure in doing things Several days   Feeling down, depressed, irritable, or hopeless More than half the days   Total Score PHQ 2 3   Trouble falling or staying asleep, or sleeping too much Several days   Feeling tired or having little energy More than half the days   Poor appetite, weight loss, or overeating Several days   Feeling bad about yourself - or that you are a failure or have let yourself or your family down Several days Trouble concentrating on things such as school, work, reading, or watching TV Several days   Moving or speaking so slowly that other people could have noticed; or the opposite being so fidgety that others notice Several days   Thoughts of being better off dead, or hurting yourself in some way Several days   PHQ 9 Score 11   How difficult have these problems made it for you to do your work, take care of your home and get along with others -         Review of systems:      A comprehensive review of systems was negative except for that written in the History of Present Illness. Physical Exam  Visit Vitals  BP (!) 127/92 (BP 1 Location: Right arm, BP Patient Position: Sitting, BP Cuff Size: Adult)   Pulse 80   Temp 98 °F (36.7 °C) (Oral)   Resp 16   Ht 5' 7\" (1.702 m)   Wt 192 lb (87.1 kg)   SpO2 96%   BMI 30.07 kg/m²       General: Alert and oriented, in no acute distress. Well nourished. LUNGS: Respirations unlabored; clear to auscultation bilaterally. CARDIOVASCULAR: Regular, rate, and rhythm without murmurs, gallops or rubs. MSK  Right upper extremity:  No gross deformity. FROM with some  pain. + tenderness to palpation along long head of bicep. negative speeds test.  No \"Thiago\" deformity. No ecchymosis. Neck range of motion is full and pain free. Muscle bulk is appropriate without wasting. Sensation is intact to light touch in the C5-T1 dermatomes. Capillary refill is less than 2 seconds in the fingers. Strength testing is 5/5 at the major muscle groups of the shoulder, elbow, and wrist.      Assessment/Plan    ICD-10-CM ICD-9-CM    1. Acute pain of right shoulder  M25.511 719.41        1. Acute pain of right shoulder   This patient and I did discussed the many options in treating rotator cuff tendonitis .   We did discuss that we could continue to seek out nonoperative modalities, such as: NSAIDs, oral and topical analgesics, joint injections, physical therapy, stretching, strengthening, and activity modification. The patient stated their understanding with this and would like to proceed with nonsurgical management as listed below  - Xray not available today, pt states she will return next week for xray if persistent. - continue Ibuprofen or tylenol for pain  - Home exercises per instructions  - Steroid injections if persistent      Follow up: as needed. RTC if worsening pain    We discussed the expected course, resolution and complications of the diagnosis(es) in detail. Medication risks, benefits, costs, interactions, and alternatives were discussed as indicated. I advised him to contact the office if his condition worsens, changes or fails to improve as anticipated. He expressed understanding with the diagnosis(es) and plan. Patient understands that this encounter was a temporary measure, and the importance of further follow up and examination was emphasized. Patient verbalized understanding.       Signed By: Adama Giang MD     April 8, 2022

## 2022-04-15 ENCOUNTER — TELEPHONE (OUTPATIENT)
Dept: FAMILY MEDICINE CLINIC | Age: 52
End: 2022-04-15

## 2022-04-15 NOTE — TELEPHONE ENCOUNTER
----- Message from [address] sent at 4/14/2022  3:55 PM EDT -----  Subject: Message to Provider    QUESTIONS  Information for Provider? Pt returned call from the office office was   closed when she called I got a red banner saying office is not open at   3:53 on 04/14 please call pt back she said it was about imaging results   ---------------------------------------------------------------------------  --------------  [address]  What is the best way for the office to contact you? OK to leave message on   voicemail  Preferred Call Back Phone Number? [phone]  ---------------------------------------------------------------------------  --------------  SCRIPT ANSWERS  Relationship to Patient?  Self

## 2022-04-20 ENCOUNTER — DOCUMENTATION ONLY (OUTPATIENT)
Dept: BEHAVIORAL/MENTAL HEALTH CLINIC | Age: 52
End: 2022-04-20

## 2022-04-20 NOTE — PROGRESS NOTES
Attempted to meet with pt for scheduled virtual visit. She did not respond to staff's attempts at check in nor did she respond to multiple Doxy texts or call from this writer. Will be considered a no show.

## 2022-04-28 ENCOUNTER — TELEPHONE (OUTPATIENT)
Dept: FAMILY MEDICINE CLINIC | Age: 52
End: 2022-04-28

## 2022-04-28 NOTE — TELEPHONE ENCOUNTER
----- Message from Miguel Ghotra sent at 4/27/2022  3:40 PM EDT -----  Subject: Message to Provider    QUESTIONS  Information for Provider? Patient needs a call back. ---------------------------------------------------------------------------  --------------  Meka HUNTER  What is the best way for the office to contact you? OK to leave message on   voicemail  Preferred Call Back Phone Number? [phone]  ---------------------------------------------------------------------------  --------------  SCRIPT ANSWERS  Relationship to Patient?  Self

## 2022-04-28 NOTE — TELEPHONE ENCOUNTER
Returned call to pt,  Requesting to increase dose of wellbutrin or a new med to be sent to pharmacy. Stated she is still struggling with depression,  Worse at night but can't take medication at night because it keeps her awake,  She is taking wellbutrin 150 mg 2 tabs in the morning.   Message sent to Dr Jovanna Preston

## 2022-04-29 DIAGNOSIS — F32.1 CURRENT MODERATE EPISODE OF MAJOR DEPRESSIVE DISORDER WITHOUT PRIOR EPISODE (HCC): ICD-10-CM

## 2022-04-29 DIAGNOSIS — F17.200 NICOTINE USE DISORDER: ICD-10-CM

## 2022-04-29 RX ORDER — SERTRALINE HYDROCHLORIDE 50 MG/1
50 TABLET, FILM COATED ORAL DAILY
Qty: 90 TABLET | Refills: 0 | Status: SHIPPED
Start: 2022-04-29 | End: 2022-08-17

## 2022-05-18 ENCOUNTER — TELEPHONE (OUTPATIENT)
Dept: FAMILY MEDICINE CLINIC | Age: 52
End: 2022-05-18

## 2022-05-18 NOTE — TELEPHONE ENCOUNTER
Called patient , message left to return call to office and schedule for a Virtual for re-evaluation if still having issues form March 2022 when antibiotics were given.

## 2022-05-18 NOTE — TELEPHONE ENCOUNTER
----- Message from Earnest Lai sent at 5/17/2022  2:01 PM EDT -----  Subject: Message to Provider    QUESTIONS  Information for Provider? patient called and and is congested and not   feeling well has taken amoxicillin and not helping please call with a   different medication   ---------------------------------------------------------------------------  --------------  CALL BACK INFO  What is the best way for the office to contact you? OK to leave message on   voicemail  Preferred Call Back Phone Number? [phone]  ---------------------------------------------------------------------------  --------------  SCRIPT ANSWERS  Relationship to Patient?  Self

## 2022-08-08 ENCOUNTER — TELEPHONE (OUTPATIENT)
Dept: FAMILY MEDICINE CLINIC | Age: 52
End: 2022-08-08

## 2022-08-08 NOTE — TELEPHONE ENCOUNTER
Patient would like to see Jackqueline Bosworth before Sept.26 due to low potassium please give her a call with something sooner she can be reached @ [phone]

## 2022-08-09 ENCOUNTER — TELEPHONE (OUTPATIENT)
Dept: FAMILY MEDICINE CLINIC | Age: 52
End: 2022-08-09

## 2022-08-09 NOTE — TELEPHONE ENCOUNTER
Patient called stating she was returning nurses call and to please call her back. She can be reached at [phone].

## 2022-08-09 NOTE — TELEPHONE ENCOUNTER
Patient to return call to office for [facility] ([place] to schedule patient for an earlier appt.  than 9/26/2022

## 2022-08-10 NOTE — TELEPHONE ENCOUNTER
Would like to come in because of concerns with her potassium. went to [facility]. 8/5/2022. Made appt for 8/17/2022 @ 12 pm coming in earlier.

## 2022-08-17 ENCOUNTER — OFFICE VISIT (OUTPATIENT)
Dept: FAMILY MEDICINE CLINIC | Age: 52
End: 2022-08-17
Payer: COMMERCIAL

## 2022-08-17 VITALS
HEIGHT: 67 IN | DIASTOLIC BLOOD PRESSURE: 75 MMHG | BODY MASS INDEX: 29.03 KG/M2 | RESPIRATION RATE: 16 BRPM | WEIGHT: 185 LBS | HEART RATE: 76 BPM | TEMPERATURE: 98 F | OXYGEN SATURATION: 97 % | SYSTOLIC BLOOD PRESSURE: 130 MMHG

## 2022-08-17 DIAGNOSIS — N83.202 LEFT OVARIAN CYST: Primary | ICD-10-CM

## 2022-08-17 DIAGNOSIS — J44.9 CHRONIC OBSTRUCTIVE PULMONARY DISEASE, UNSPECIFIED COPD TYPE (HCC): ICD-10-CM

## 2022-08-17 DIAGNOSIS — E87.6 HYPOKALEMIA: ICD-10-CM

## 2022-08-17 PROCEDURE — 99213 OFFICE O/P EST LOW 20 MIN: CPT | Performed by: STUDENT IN AN ORGANIZED HEALTH CARE EDUCATION/TRAINING PROGRAM

## 2022-08-17 RX ORDER — TRETINOIN 0.5 MG/G
CREAM TOPICAL
Qty: 20 G | Refills: 0 | Status: CANCELLED | OUTPATIENT
Start: 2022-08-17

## 2022-08-17 RX ORDER — POTASSIUM CHLORIDE 20 MEQ/1
20 TABLET, EXTENDED RELEASE ORAL DAILY
Qty: 90 TABLET | Refills: 3 | Status: SHIPPED | OUTPATIENT
Start: 2022-08-17

## 2022-08-17 RX ORDER — BUDESONIDE AND FORMOTEROL FUMARATE DIHYDRATE 160; 4.5 UG/1; UG/1
AEROSOL RESPIRATORY (INHALATION)
Qty: 3 EACH | Refills: 3 | Status: SHIPPED | OUTPATIENT
Start: 2022-08-17

## 2022-08-17 NOTE — PROGRESS NOTES
Name and  Verified. Pharmacy verified     Chief Complaint   Patient presents with    ED Follow-up     [place] 2022           1. Have you been to the ER, urgent care clinic since your last visit? Hospitalized since your last visit? Yes  [place]   2022  [facility]    2. Have you seen or consulted any other health care providers outside of the [address] [place] [address] since your last visit? Include any pap smears or colon screening.  No      Health Maintenance Due   Topic Date Due    Pneumococcal 0-64 years (1 - PCV) Never done    Cervical cancer screen  Never done    Colorectal Cancer Screening Combo  Never done    Shingrix Vaccine Age 50> (1 of 2) Never done

## 2022-08-17 NOTE — PATIENT INSTRUCTIONS
OB[facility]GYN:      -[facility], NP OR Dr. Janet Izquierdo (all female providers)    [facility] Arvella Cage [place], [address]  [phone]      -[address] [facility]   [phone]  [address] [facility] [address], [address]   [phone]    -[facility] [address] (Dr. Ken Colon) - [phone]

## 2022-08-17 NOTE — PROGRESS NOTES
[address] [place] [address]  [phone]    Chief Complaint: ER follow up    Subjective  Cherylene Salen is a 50 y.o. [address] [facility] female , established patient, here for evaluation of the concern(s) above;    ER follow up:  Patient went to [facility] ER about 10 days ago for evaluation of left flank pain and found to have a 4 mm left sided stone. States that pain is gone. She has not vomited since the ER visit. Her potassium was found to be 3 and received IV potassium. Cr was 1.4 (GFR 40)  CT abd/pelv also showed Large left ovarian cyst and geographic hepatic steatosis. Allergies - reviewed:    Allergies   Allergen Reactions    Hydromorphone Itching     morphine    Morphine Itching    Sulfa (Sulfonamide Antibiotics) Nausea and Vomiting    Hydralazine Rash and Unknown (comments)     jitteriness         Past Medical History - reviewed:  Past Medical History:   Diagnosis Date    Anemia 2/18/2012    Cocaine abuse affecting pregnancy ([facility]     Essential hypertension     Hypertension     SVT (supraventricular tachycardia) ([facility] 11/16/2018       Depression screening:  3 most recent PHQ Screens 1/20/2022   PHQ Not Done -   Little interest or pleasure in doing things Several days   Feeling down, depressed, irritable, or hopeless More than half the days   Total Score PHQ 2 3   Trouble falling or staying asleep, or sleeping too much Several days   Feeling tired or having little energy More than half the days   Poor appetite, weight loss, or overeating Several days   Feeling bad about yourself - or that you are a failure or have let yourself or your family down Several days   Trouble concentrating on things such as school, work, reading, or watching TV Several days   Moving or speaking so slowly that other people could have noticed; or the opposite being so fidgety that others notice Several days   Thoughts of being better off dead, or hurting yourself in some way Several days   PHQ 9 Score 11   How difficult have these problems made it for you to do your work, take care of your home and get along with others -         Review of systems:      A comprehensive review of systems was negative except for that written in the History of Present Illness. Physical Exam  Visit Vitals  /75 (BP 1 Location: Right arm, BP Patient Position: Sitting, BP Cuff Size: Adult)   Pulse 76   Temp 98 °F (36.7 °C) (Oral)   Resp 16   Ht 5' 7\" (1.702 m)   Wt 185 lb (83.9 kg)   SpO2 97%   BMI 28.98 kg/m²       General: Alert and oriented, in no acute distress. Well nourished. SKIN: No rash. No suspicious lesions or moles. EYE: PERRL. Sclera and conjuctival clear. Extraocular movements intact. EARS: External normal, canals clear, tympanic membranes normal.   NOSE: Mucosa healthy without drainage or ulceration. OROPHARYNX: No suspicious lesions, normal dentition, pharynx, tongue and tonsils normal.  NECK: Supple; no masses; thyroid normal.  LUNGS: Respirations unlabored; clear to auscultation bilaterally. CARDIOVASCULAR: Regular, rate, and rhythm without murmurs, gallops or rubs. ABDOMEN: Soft; nontender; nondistended; normoactive bowel sounds; no masses or organomegaly. MUSCULOSKELETAL: FROM in all extremities     EXT: No edema. Neurovascularlly intact. Normal gait. Neurological: Alert and oriented X 3, normal strength and tone. Normal symmetric reflexes. Normal coordination and gait       Assessment/Plan    ICD-10-CM ICD-9-CM    1. Left ovarian cyst  N83.202 620.2 REFERRAL TO OBSTETRICS AND GYNECOLOGY      2. Hypokalemia  E87.6 276.8 potassium chloride (K-DUR, KLOR-CON M20) 20 mEq tablet      METABOLIC PANEL, BASIC      METABOLIC PANEL, BASIC      3. Chronic obstructive pulmonary disease, unspecified COPD type (HCC)  J44.9 496 budesonide-formoteroL (Symbicort) 160-4.5 mcg/actuation HFAA        1.  Left ovarian cyst  - REFERRAL TO OBSTETRICS AND GYNECOLOGY    2. Hypokalemia  - potassium chloride (K-DUR, KLOR-CON M20) 20 mEq tablet; Take 1 Tablet by mouth daily.   - METABOLIC PANEL, BASIC; Future    3. Chronic obstructive pulmonary disease, unspecified COPD type ([facility]  - budesonide-formoteroL (Symbicort) 160-4.5 mcg/actuation HFAA; INHALE TWO PUFFS BY MOUTH TWICE A DAY      Follow up: 3 months or sooner if needed    On this date 08/17/22 I have spent 25 minutes reviewing previous notes, test results and face to face  with the patient discussing the diagnosis and importance of compliance with the treatment plan as well as documenting on the day of the visit. We discussed the expected course, resolution and complications of the diagnosis(es) in detail. Medication risks, benefits, costs, interactions, and alternatives were discussed as indicated. I advised him to contact the office if his condition worsens, changes or fails to improve as anticipated. He expressed understanding with the diagnosis(es) and plan. Patient understands that this encounter was a temporary measure, and the importance of further follow up and examination was emphasized. Patient verbalized understanding.       Signed By: Bibiana Jackson MD     August 17, 2022

## 2022-08-18 LAB
ANION GAP SERPL CALC-SCNC: 6 MMOL/L (ref 5–15)
BUN SERPL-MCNC: 23 MG/DL (ref 6–20)
BUN/CREAT SERPL: 16 (ref 12–20)
CALCIUM SERPL-MCNC: 9.8 MG/DL (ref 8.5–10.1)
CHLORIDE SERPL-SCNC: 102 MMOL/L (ref 97–108)
CO2 SERPL-SCNC: 32 MMOL/L (ref 21–32)
CREAT SERPL-MCNC: 1.42 MG/DL (ref 0.55–1.02)
GLUCOSE SERPL-MCNC: 127 MG/DL (ref 65–100)
POTASSIUM SERPL-SCNC: 3.4 MMOL/L (ref 3.5–5.1)
SODIUM SERPL-SCNC: 140 MMOL/L (ref 136–145)

## 2022-08-23 NOTE — PROGRESS NOTES
Reviewed. Cr remains stable at 1.42 (from 1.4) and GFR 39 (from 40) compared to her recent ER visit.

## 2022-11-07 ENCOUNTER — NURSE TRIAGE (OUTPATIENT)
Dept: OTHER | Facility: CLINIC | Age: 52
End: 2022-11-07

## 2022-11-07 ENCOUNTER — TELEPHONE (OUTPATIENT)
Dept: FAMILY MEDICINE CLINIC | Age: 52
End: 2022-11-07

## 2022-11-07 NOTE — TELEPHONE ENCOUNTER
Patient wants a return call regarding having dizziness x 2 days. She said her BP was 140/100 this morning.   Please give her a call @ [phone]

## 2022-11-07 NOTE — TELEPHONE ENCOUNTER
Location of patient: [address] [facility] [place] from [place] at [facility] with "[employer]. Subjective: Caller states \"Possible vertigo\"     Current Symptoms: Dizziness with self spinning sensation especially when getting up, happened a month ago went away and comes back, been on and off, Saturday was worse, went away, this morning spinning sensation, no currently dizzy but is sitting down. Onset: 1 month ago; intermittent, worsening    Associated Symptoms: NA    Pain Severity: Denies    Temperature: Denies    What has been tried: Dramamine-helping    LMP: NA Pregnant: NA    Recommended disposition: See in Office Today    Care advice provided, patient verbalizes understanding; denies any other questions or concerns; instructed to call back for any new or worsening symptoms. Patient/Caller agrees with recommended disposition; writer provided warm transfer to Gabriel Jean at [facility] for appointment scheduling    Attention Provider: Thank you for allowing me to participate in the care of your patient. The patient was connected to triage in response to information provided to the [facility]. Please do not respond through this encounter as the response is not directed to a shared pool.         Reason for Disposition   MODERATE dizziness (e.g., interferes with normal activities) (Exception: dizziness caused by heat exposure, sudden standing, or poor fluid intake)    Protocols used: Dizziness-ADULT-OH

## 2023-03-21 ENCOUNTER — OFFICE VISIT (OUTPATIENT)
Dept: FAMILY MEDICINE CLINIC | Age: 53
End: 2023-03-21
Payer: COMMERCIAL

## 2023-03-21 VITALS
DIASTOLIC BLOOD PRESSURE: 63 MMHG | OXYGEN SATURATION: 96 % | HEIGHT: 67 IN | BODY MASS INDEX: 30.92 KG/M2 | RESPIRATION RATE: 17 BRPM | TEMPERATURE: 97.6 F | HEART RATE: 84 BPM | SYSTOLIC BLOOD PRESSURE: 108 MMHG | WEIGHT: 197 LBS

## 2023-03-21 DIAGNOSIS — R59.0 CERVICAL ADENOPATHY: Primary | ICD-10-CM

## 2023-03-21 LAB
ALBUMIN SERPL-MCNC: 3.6 G/DL (ref 3.5–5)
ALBUMIN/GLOB SERPL: 0.9 (ref 1.1–2.2)
ALP SERPL-CCNC: 96 U/L (ref 45–117)
ALT SERPL-CCNC: 67 U/L (ref 12–78)
ANION GAP SERPL CALC-SCNC: 5 MMOL/L (ref 5–15)
AST SERPL-CCNC: 40 U/L (ref 15–37)
BILIRUB SERPL-MCNC: 0.7 MG/DL (ref 0.2–1)
BUN SERPL-MCNC: 17 MG/DL (ref 6–20)
BUN/CREAT SERPL: 13 (ref 12–20)
CALCIUM SERPL-MCNC: 8.9 MG/DL (ref 8.5–10.1)
CHLORIDE SERPL-SCNC: 103 MMOL/L (ref 97–108)
CO2 SERPL-SCNC: 29 MMOL/L (ref 21–32)
CREAT SERPL-MCNC: 1.27 MG/DL (ref 0.55–1.02)
GLOBULIN SER CALC-MCNC: 4.1 G/DL (ref 2–4)
GLUCOSE SERPL-MCNC: 239 MG/DL (ref 65–100)
POTASSIUM SERPL-SCNC: 4 MMOL/L (ref 3.5–5.1)
PROT SERPL-MCNC: 7.7 G/DL (ref 6.4–8.2)
SODIUM SERPL-SCNC: 137 MMOL/L (ref 136–145)

## 2023-03-21 PROCEDURE — 99213 OFFICE O/P EST LOW 20 MIN: CPT | Performed by: FAMILY MEDICINE

## 2023-03-21 PROCEDURE — 3078F DIAST BP <80 MM HG: CPT | Performed by: FAMILY MEDICINE

## 2023-03-21 PROCEDURE — 3074F SYST BP LT 130 MM HG: CPT | Performed by: FAMILY MEDICINE

## 2023-03-21 RX ORDER — DOXYCYCLINE 100 MG/1
100 CAPSULE ORAL 2 TIMES DAILY
Qty: 20 CAPSULE | Refills: 0 | Status: SHIPPED | OUTPATIENT
Start: 2023-03-21 | End: 2023-03-31

## 2023-03-21 NOTE — PROGRESS NOTES
Dinorah Sanchez (: 1970) is a 50 y.o. female, established patient, here for evaluation of the following chief complaint(s): Other (Knot on left side upper neck x 1-2 weeks )       Patient with history of tobacco abuse present with a complaint ofHazelnut sized lump on the left upper neck, Started few days ago not better, the patient has tried alcohol washing and OTC antibiotic ointments,  no family member have the same problem, it does tingles and it is pain full, states that is expanding red, and is swelled up, like a lump     Constitutional: no chills and fever,  , nad     HENT: no ear pain or nosebleeds. No blurred vision  Respiratory: no shortness of breath, wheezing cough   Cardiovascular: Has no chest pain, ,and racing heart . Gastrointestinal: No constipation, diarrhea, nausea and vomiting. Genitourinary: No frequency. Musculoskeletal: Negative for joint pain. Skin: no itching, no rash. Neurological: Negative for dizziness, no tremors  Psychiatric/Behavioral: Negative for depression   is not nervous/anxious. and not depressed the patient is not nervous/anxious. General:  alert cooperative appears stated for the age  [de-identified]; normocephalic and atraumatic PERRLA extraocular motor intact normal tympanic membrane normal external ear bilaterally, mucosal normal no drainage  Neck: supple no adenopathy no JVD no bruit  Lungs: Clear to auscultation bilaterally no rales rhonchi or wheezes  Heart: Normal regular S1-S2 ,  no murmur  Breast exam deferred  Abdomen: Soft nontender normal bowel sounds   Extremities: Normal range of motion no point tenderness normal pulses no edema  Pelvic/: No lesion, no lymphadenopathy  Skin: Normal no lesion no rash        ASSESSMENT/PLAN:  Below is the assessment and plan developed based on review of pertinent history, physical exam, labs, studies, and medications. 1. Cervical adenopathy  -     doxycycline (VIBRAMYCIN) 100 mg capsule;  Take 1 Capsule by mouth two (2) times a day for 10 days. , Normal, Disp-20 Capsule, R-0  -     METABOLIC PANEL, COMPREHENSIVE; Future    Return in about 10 days (around 3/31/2023), or if symptoms worsen or fail to improve. Potassium level was rechecked and is in a normal state  Will start on Abx high dose , advise to wash bid with soaps and water, multiple hand washing, medications side effects was addressed, patient also was told if the lump does not go away after the current treatment patient needs to return to clinic for further investigation patient acknowledged and agreed was told to RTC or call if not better     An electronic signature was used to authenticate this note.   -- Angelique Knowles MD

## 2023-03-21 NOTE — PROGRESS NOTES
Identified pt with two pt identifiers(name and ). Reviewed record in preparation for visit and have obtained necessary documentation. Chief Complaint   Patient presents with    Other     Knot on left side upper neck x 1-2 weeks         Vitals:    23 1125   BP: 108/63   Pulse: 84   Resp: 17   Temp: 97.6 °F (36.4 °C)   TempSrc: Oral   SpO2: 96%   Weight: 197 lb (89.4 kg)   Height: 5' 7\" (1.702 m)   PainSc:   2   PainLoc: Neck       Health Maintenance Due   Topic    Pneumococcal 0-64 years (1 - PCV)    Cervical cancer screen     Colorectal Cancer Screening Combo     COVID-19 Vaccine (3 - Booster for Pfizer series)    Shingles Vaccine (1 of 2)    Flu Vaccine (1)    Depression Monitoring     A1C test (Diabetic or Prediabetic)     Breast Cancer Screen Mammogram        Coordination of Care Questionnaire:  :   1) Have you been to an emergency room, urgent care, or hospitalized since your last visit? If yes, where when, and reason for visit? no       2. Have seen or consulted any other health care provider since your last visit? If yes, where when, and reason for visit? NO      Patient is accompanied by self I have received verbal consent from Patria Campbell to discuss any/all medical information while they are present in the room.

## 2023-07-19 ENCOUNTER — TELEPHONE (OUTPATIENT)
Age: 53
End: 2023-07-19

## 2023-07-19 NOTE — TELEPHONE ENCOUNTER
Message  Received: 2 days ago  [id number] [facility] Des Staff  Subject: Appointment Request     Reason for Call: Established Patient Appointment needed: Routine Existing   Condition Follow Up (Diabetes)     QUESTIONS     Reason for appointment request? Available appointments did not meet   patient need       Additional Information for Provider? Patient is in need of an appointment   asap. Please try to squeeze the patient in on Dr. Fabiola Fuentes schedule. First   available appointment is in September.  Patient stated that she cannot wait   that long.   ---------------------------------------------------------------------------   --------------   Emily WOODARD   [phone]; OK to leave message on voicemail   ---------------------------------------------------------------------------   --------------

## 2023-08-21 ENCOUNTER — OFFICE VISIT (OUTPATIENT)
Age: 53
End: 2023-08-21
Payer: COMMERCIAL

## 2023-08-21 VITALS
HEART RATE: 79 BPM | SYSTOLIC BLOOD PRESSURE: 185 MMHG | RESPIRATION RATE: 17 BRPM | HEIGHT: 67 IN | WEIGHT: 180 LBS | BODY MASS INDEX: 28.25 KG/M2 | DIASTOLIC BLOOD PRESSURE: 131 MMHG | TEMPERATURE: 98 F | OXYGEN SATURATION: 96 %

## 2023-08-21 DIAGNOSIS — I10 RESISTANT HYPERTENSION: Primary | ICD-10-CM

## 2023-08-21 DIAGNOSIS — J44.9 CHRONIC OBSTRUCTIVE PULMONARY DISEASE, UNSPECIFIED COPD TYPE (HCC): ICD-10-CM

## 2023-08-21 DIAGNOSIS — F32.1 MODERATE MAJOR DEPRESSION (HCC): ICD-10-CM

## 2023-08-21 DIAGNOSIS — R73.03 PREDIABETES: ICD-10-CM

## 2023-08-21 DIAGNOSIS — Z91.199 NONCOMPLIANCE: ICD-10-CM

## 2023-08-21 LAB — HBA1C MFR BLD: 5.8 %

## 2023-08-21 PROCEDURE — 83036 HEMOGLOBIN GLYCOSYLATED A1C: CPT | Performed by: STUDENT IN AN ORGANIZED HEALTH CARE EDUCATION/TRAINING PROGRAM

## 2023-08-21 PROCEDURE — 3080F DIAST BP >= 90 MM HG: CPT | Performed by: STUDENT IN AN ORGANIZED HEALTH CARE EDUCATION/TRAINING PROGRAM

## 2023-08-21 PROCEDURE — PBSHW AMB POC HEMOGLOBIN A1C: Performed by: STUDENT IN AN ORGANIZED HEALTH CARE EDUCATION/TRAINING PROGRAM

## 2023-08-21 PROCEDURE — 99214 OFFICE O/P EST MOD 30 MIN: CPT | Performed by: STUDENT IN AN ORGANIZED HEALTH CARE EDUCATION/TRAINING PROGRAM

## 2023-08-21 PROCEDURE — 3077F SYST BP >= 140 MM HG: CPT | Performed by: STUDENT IN AN ORGANIZED HEALTH CARE EDUCATION/TRAINING PROGRAM

## 2023-08-21 RX ORDER — BUPROPION HYDROCHLORIDE 150 MG/1
TABLET ORAL
Qty: 180 TABLET | Refills: 2 | Status: SHIPPED | OUTPATIENT
Start: 2023-08-21

## 2023-08-21 RX ORDER — CHLORTHALIDONE 25 MG/1
25 TABLET ORAL DAILY
Qty: 90 TABLET | Refills: 2 | Status: SHIPPED | OUTPATIENT
Start: 2023-08-21 | End: 2023-08-21 | Stop reason: SDUPTHER

## 2023-08-21 RX ORDER — BUDESONIDE AND FORMOTEROL FUMARATE DIHYDRATE 160; 4.5 UG/1; UG/1
2 AEROSOL RESPIRATORY (INHALATION) 2 TIMES DAILY
Qty: 10.2 G | Refills: 5 | Status: SHIPPED | OUTPATIENT
Start: 2023-08-21

## 2023-08-21 RX ORDER — LOSARTAN POTASSIUM 50 MG/1
50 TABLET ORAL EVERY MORNING
Qty: 90 TABLET | Refills: 1 | Status: SHIPPED | OUTPATIENT
Start: 2023-08-21

## 2023-08-21 RX ORDER — CHLORTHALIDONE 25 MG/1
25 TABLET ORAL DAILY
Qty: 90 TABLET | Refills: 2 | Status: SHIPPED | OUTPATIENT
Start: 2023-08-21

## 2023-08-21 RX ORDER — AMLODIPINE BESYLATE 10 MG/1
10 TABLET ORAL EVERY MORNING
Qty: 90 TABLET | Refills: 1 | Status: SHIPPED | OUTPATIENT
Start: 2023-08-21

## 2023-08-21 SDOH — ECONOMIC STABILITY: INCOME INSECURITY: HOW HARD IS IT FOR YOU TO PAY FOR THE VERY BASICS LIKE FOOD, HOUSING, MEDICAL CARE, AND HEATING?: NOT HARD AT ALL

## 2023-08-21 SDOH — ECONOMIC STABILITY: FOOD INSECURITY: WITHIN THE PAST 12 MONTHS, THE FOOD YOU BOUGHT JUST DIDN'T LAST AND YOU DIDN'T HAVE MONEY TO GET MORE.: NEVER TRUE

## 2023-08-21 SDOH — ECONOMIC STABILITY: FOOD INSECURITY: WITHIN THE PAST 12 MONTHS, YOU WORRIED THAT YOUR FOOD WOULD RUN OUT BEFORE YOU GOT MONEY TO BUY MORE.: NEVER TRUE

## 2023-08-21 SDOH — ECONOMIC STABILITY: HOUSING INSECURITY
IN THE LAST 12 MONTHS, WAS THERE A TIME WHEN YOU DID NOT HAVE A STEADY PLACE TO SLEEP OR SLEPT IN A SHELTER (INCLUDING NOW)?: NO

## 2023-08-21 NOTE — PROGRESS NOTES
Name and Date of Birth Verified    Pharmacy Verified    Chief Complaint   Patient presents with    Medication Refill     Declined to have labs drawn today. 1. \"Have you been to the ER, urgent care clinic since your last visit? Hospitalized since your last visit? \" No    2. \"Have you seen or consulted any other health care providers outside of the [address] [place] [address] since your last visit? \" No     3. For patients aged 43-73: Has the patient had a colonoscopy / FIT/ Cologuard? No      If the patient is female:    4. For patients aged 43-66: Has the patient had a mammogram within the past 2 years? Yes 3/9/2022      5. For patients aged 21-65: Has the patient had a pap smear?      Yes 10/1/2022       Health Maintenance Due   Topic Date Due    Pneumococcal 0-64 years Vaccine (1 - PCV) Never done    Colorectal Cancer Screen  Never done    COVID-19 Vaccine (3 - Booster for Pfizer series) 12/16/2021    Shingles vaccine (1 of 2) Never done    A1C test (Diabetic or Prediabetic)  01/20/2023    Breast cancer screen  03/09/2023    Flu vaccine (1) 08/01/2023

## 2023-08-21 NOTE — PROGRESS NOTES
[address] [address] WILLIAM Gillespie. [place], [facility]  [phone]    Chief Complaint: HTN and depression    Subjective  Manjula Hill is a 48 y.o. White (non-) female , established patient, here for evaluation of the concern(s) above; This is a long overdue visit. Here with her Friend [facility]). HTN:  Has h/o severe/uncontrolled BP which we were able to keep it under control after several medcations. Pt however stopped taking all the medications except for Chlorthalidone. States that she has not been taking her medications for about 6 months or more because she \"felt good\". Does not check BP at home. Previous meds; Chlorthalidone 25mg, Losartan 100mg, Amlodipine 10mg and aldactone 25 mg daily. Pt is everyday smoker. Depression and Anxiety:  She is currently on Wellbutrin 150mg BID and states that she is doing great. States that she feels like a different person.  -Any suicidal ideation: no  -Any homicidal ideation: no     Pt denies any  fever, chill, night sweats, chest pain, pressure, SOB. Intermittent GO due to her COPD. Social History  Living situation: Lives with her mom. Relationship status:single. Has 3 children; 32, 21 and 15 yo. Non of the kids live with her  Smoking - 1 ppd  Legal issues: no  Occupation: previously working at [employer] but lost her job. Looking for another job  Trauma: Youngest child's father  and it was hard on her. Allergies - reviewed:    Allergies   Allergen Reactions    Hydromorphone Itching     morphine    Morphine Itching    Hydralazine Rash     Other reaction(s): Unknown (comments)  jitteriness    Sulfa Antibiotics Nausea And Vomiting       Past Medical History - reviewed:  Past Medical History:   Diagnosis Date    Anemia 2012    Cocaine abuse affecting pregnancy ([address])     Essential hypertension     Hypertension     SVT (supraventricular tachycardia) ([address]) 2018

## 2023-10-17 ENCOUNTER — NURSE TRIAGE (OUTPATIENT)
Dept: OTHER | Facility: CLINIC | Age: 53
End: 2023-10-17

## 2023-10-17 NOTE — TELEPHONE ENCOUNTER
Location of patient: [address] [facility] [place] call from Lady Jefferson at [facility] with Written. Subjective: Caller states \"the right foot feels a lot better, I can walk now. But its still swollen the right toe is double it's size. \"     Current Symptoms: swelling and pain in both big toes. Started in R foot, hot to the touch and painful. Unable to bend, states can't put her slippers on    Denies hx of gout or feet swelling or injury    Onset: R foot 1 month ago, left foot today      Associated Symptoms: reduced activity    Pain Severity: 8/10; I don't know;     Temperature: denies fevers     What has been tried: Ibuprofen-a little relief     LMP:  Pregnant:     Recommended disposition: Go to Office Now    Care advice provided, patient verbalizes understanding; denies any other questions or concerns; instructed to call back for any new or worsening symptoms. Patient/Caller agrees with recommended disposition; writer provided warm transfer to Denise Ching at [facility] for appointment scheduling    Attention Provider: Thank you for allowing me to participate in the care of your patient. The patient was connected to triage in response to information provided to the ECC/PSC. Please do not respond through this encounter as the response is not directed to a shared pool. Reminders:     Call [facility] Provider Office    Care Advice - both instruction and documentation    Routing - PCP (and NP for 2nd level triage)    PLEASE DELETE ALL RED TEXT PRIOR TO SIGNING NOTE    Reason for Disposition   SEVERE pain (e.g., excruciating, unable to do any normal activities) and not improved after 2 hours of pain medicine    Protocols used:  Toe Pain-ADULT-[place]

## 2024-01-31 DIAGNOSIS — I1A.0 RESISTANT HYPERTENSION: ICD-10-CM

## 2024-01-31 RX ORDER — AMLODIPINE BESYLATE 10 MG/1
10 TABLET ORAL EVERY MORNING
Qty: 90 TABLET | Refills: 1 | Status: SHIPPED | OUTPATIENT
Start: 2024-01-31 | End: 2024-02-01 | Stop reason: SDUPTHER

## 2024-01-31 RX ORDER — LOSARTAN POTASSIUM 50 MG/1
50 TABLET ORAL EVERY MORNING
Qty: 90 TABLET | Refills: 1 | Status: SHIPPED | OUTPATIENT
Start: 2024-01-31

## 2024-01-31 NOTE — TELEPHONE ENCOUNTER
Last appointment: 8/21/23  Next appointment: 2/1/24  Previous refill encounter(s): 8/21/23 #90 with 1 refill    Requested Prescriptions     Pending Prescriptions Disp Refills    amLODIPine (NORVASC) 10 MG tablet 90 tablet 1     Sig: Take 1 tablet by mouth every morning    losartan (COZAAR) 50 MG tablet 90 tablet 1     Sig: Take 1 tablet by mouth every morning         For Pharmacy Admin Tracking Only    Program: Medication Refill  CPA in place:    Recommendation Provided To:   Intervention Detail: New Rx: 2, reason: Patient Preference  Intervention Accepted By:   Gap Closed?:    Time Spent (min): 5

## 2024-02-01 ENCOUNTER — HOSPITAL ENCOUNTER (EMERGENCY)
Facility: HOSPITAL | Age: 54
Discharge: HOME | End: 2024-02-01
Attending: EMERGENCY MEDICINE
Payer: COMMERCIAL

## 2024-02-01 ENCOUNTER — APPOINTMENT (OUTPATIENT)
Facility: HOSPITAL | Age: 54
End: 2024-02-01
Payer: COMMERCIAL

## 2024-02-01 ENCOUNTER — OFFICE VISIT (OUTPATIENT)
Age: 54
End: 2024-02-01
Payer: COMMERCIAL

## 2024-02-01 VITALS
RESPIRATION RATE: 17 BRPM | TEMPERATURE: 96.9 F | OXYGEN SATURATION: 97 % | WEIGHT: 188.27 LBS | HEART RATE: 139 BPM | HEIGHT: 67 IN | BODY MASS INDEX: 29.55 KG/M2

## 2024-02-01 VITALS
HEART RATE: 77 BPM | TEMPERATURE: 97.1 F | DIASTOLIC BLOOD PRESSURE: 73 MMHG | WEIGHT: 180 LBS | RESPIRATION RATE: 16 BRPM | SYSTOLIC BLOOD PRESSURE: 110 MMHG | BODY MASS INDEX: 28.25 KG/M2 | OXYGEN SATURATION: 95 % | HEIGHT: 67 IN

## 2024-02-01 DIAGNOSIS — E87.6 HYPOKALEMIA: ICD-10-CM

## 2024-02-01 DIAGNOSIS — I1A.0 RESISTANT HYPERTENSION: ICD-10-CM

## 2024-02-01 DIAGNOSIS — F32.1 MODERATE MAJOR DEPRESSION (HCC): ICD-10-CM

## 2024-02-01 DIAGNOSIS — J44.9 CHRONIC OBSTRUCTIVE PULMONARY DISEASE, UNSPECIFIED COPD TYPE (HCC): ICD-10-CM

## 2024-02-01 DIAGNOSIS — I47.10 PAROXYSMAL SUPRAVENTRICULAR TACHYCARDIA: Primary | ICD-10-CM

## 2024-02-01 DIAGNOSIS — R00.0 TACHYCARDIA: Primary | ICD-10-CM

## 2024-02-01 DIAGNOSIS — F17.200 NICOTINE USE DISORDER: ICD-10-CM

## 2024-02-01 DIAGNOSIS — R06.09 DOE (DYSPNEA ON EXERTION): ICD-10-CM

## 2024-02-01 LAB
ALBUMIN SERPL-MCNC: 3.3 G/DL (ref 3.5–5)
ALBUMIN/GLOB SERPL: 0.8 (ref 1.1–2.2)
ALP SERPL-CCNC: 73 U/L (ref 45–117)
ALT SERPL-CCNC: 62 U/L (ref 12–78)
ANION GAP SERPL CALC-SCNC: 6 MMOL/L (ref 5–15)
AST SERPL-CCNC: 37 U/L (ref 15–37)
BASOPHILS # BLD: 0.1 K/UL (ref 0–0.1)
BASOPHILS NFR BLD: 1 % (ref 0–1)
BILIRUB SERPL-MCNC: 0.4 MG/DL (ref 0.2–1)
BUN SERPL-MCNC: 21 MG/DL (ref 6–20)
BUN/CREAT SERPL: 14 (ref 12–20)
CALCIUM SERPL-MCNC: 8.9 MG/DL (ref 8.5–10.1)
CHLORIDE SERPL-SCNC: 103 MMOL/L (ref 97–108)
CO2 SERPL-SCNC: 32 MMOL/L (ref 21–32)
CREAT SERPL-MCNC: 1.55 MG/DL (ref 0.55–1.02)
DIFFERENTIAL METHOD BLD: ABNORMAL
EOSINOPHIL # BLD: 0.1 K/UL (ref 0–0.4)
EOSINOPHIL NFR BLD: 1 % (ref 0–7)
ERYTHROCYTE [DISTWIDTH] IN BLOOD BY AUTOMATED COUNT: 12.3 % (ref 11.5–14.5)
GLOBULIN SER CALC-MCNC: 4.1 G/DL (ref 2–4)
GLUCOSE SERPL-MCNC: 265 MG/DL (ref 65–100)
HCT VFR BLD AUTO: 47.3 % (ref 35–47)
HGB BLD-MCNC: 16.8 G/DL (ref 11.5–16)
IMM GRANULOCYTES # BLD AUTO: 0.1 K/UL (ref 0–0.04)
IMM GRANULOCYTES NFR BLD AUTO: 1 % (ref 0–0.5)
LYMPHOCYTES # BLD: 3.9 K/UL (ref 0.8–3.5)
LYMPHOCYTES NFR BLD: 29 % (ref 12–49)
MAGNESIUM SERPL-MCNC: 1.7 MG/DL (ref 1.6–2.4)
MCH RBC QN AUTO: 33.8 PG (ref 26–34)
MCHC RBC AUTO-ENTMCNC: 35.5 G/DL (ref 30–36.5)
MCV RBC AUTO: 95.2 FL (ref 80–99)
MONOCYTES # BLD: 0.6 K/UL (ref 0–1)
MONOCYTES NFR BLD: 5 % (ref 5–13)
NEUTS SEG # BLD: 8.6 K/UL (ref 1.8–8)
NEUTS SEG NFR BLD: 63 % (ref 32–75)
NRBC # BLD: 0 K/UL (ref 0–0.01)
NRBC BLD-RTO: 0 PER 100 WBC
NT PRO BNP: 6648 PG/ML
PLATELET # BLD AUTO: 337 K/UL (ref 150–400)
PMV BLD AUTO: 11.4 FL (ref 8.9–12.9)
POTASSIUM SERPL-SCNC: 2.9 MMOL/L (ref 3.5–5.1)
PROT SERPL-MCNC: 7.4 G/DL (ref 6.4–8.2)
RBC # BLD AUTO: 4.97 M/UL (ref 3.8–5.2)
SODIUM SERPL-SCNC: 141 MMOL/L (ref 136–145)
TROPONIN I SERPL HS-MCNC: 20 NG/L (ref 0–51)
WBC # BLD AUTO: 13.4 K/UL (ref 3.6–11)

## 2024-02-01 PROCEDURE — 93005 ELECTROCARDIOGRAM TRACING: CPT | Performed by: EMERGENCY MEDICINE

## 2024-02-01 PROCEDURE — 99285 EMERGENCY DEPT VISIT HI MDM: CPT

## 2024-02-01 PROCEDURE — 83735 ASSAY OF MAGNESIUM: CPT

## 2024-02-01 PROCEDURE — 96360 HYDRATION IV INFUSION INIT: CPT

## 2024-02-01 PROCEDURE — 94762 N-INVAS EAR/PLS OXIMTRY CONT: CPT

## 2024-02-01 PROCEDURE — 93010 ELECTROCARDIOGRAM REPORT: CPT | Performed by: STUDENT IN AN ORGANIZED HEALTH CARE EDUCATION/TRAINING PROGRAM

## 2024-02-01 PROCEDURE — 6370000000 HC RX 637 (ALT 250 FOR IP): Performed by: EMERGENCY MEDICINE

## 2024-02-01 PROCEDURE — 36415 COLL VENOUS BLD VENIPUNCTURE: CPT

## 2024-02-01 PROCEDURE — 99214 OFFICE O/P EST MOD 30 MIN: CPT | Performed by: STUDENT IN AN ORGANIZED HEALTH CARE EDUCATION/TRAINING PROGRAM

## 2024-02-01 PROCEDURE — 93005 ELECTROCARDIOGRAM TRACING: CPT | Performed by: STUDENT IN AN ORGANIZED HEALTH CARE EDUCATION/TRAINING PROGRAM

## 2024-02-01 PROCEDURE — 96374 THER/PROPH/DIAG INJ IV PUSH: CPT

## 2024-02-01 PROCEDURE — 2580000003 HC RX 258: Performed by: EMERGENCY MEDICINE

## 2024-02-01 PROCEDURE — 71045 X-RAY EXAM CHEST 1 VIEW: CPT

## 2024-02-01 PROCEDURE — 84484 ASSAY OF TROPONIN QUANT: CPT

## 2024-02-01 PROCEDURE — 92960 CARDIOVERSION ELECTRIC EXT: CPT

## 2024-02-01 PROCEDURE — 83880 ASSAY OF NATRIURETIC PEPTIDE: CPT

## 2024-02-01 PROCEDURE — 80053 COMPREHEN METABOLIC PANEL: CPT

## 2024-02-01 PROCEDURE — 85025 COMPLETE CBC W/AUTO DIFF WBC: CPT

## 2024-02-01 PROCEDURE — 6360000002 HC RX W HCPCS: Performed by: EMERGENCY MEDICINE

## 2024-02-01 RX ORDER — CHLORTHALIDONE 25 MG/1
25 TABLET ORAL DAILY
Qty: 90 TABLET | Refills: 2 | Status: SHIPPED | OUTPATIENT
Start: 2024-02-01

## 2024-02-01 RX ORDER — 0.9 % SODIUM CHLORIDE 0.9 %
1000 INTRAVENOUS SOLUTION INTRAVENOUS
Status: COMPLETED | OUTPATIENT
Start: 2024-02-01 | End: 2024-02-01

## 2024-02-01 RX ORDER — POTASSIUM CHLORIDE 20 MEQ/1
20 TABLET, EXTENDED RELEASE ORAL DAILY
Qty: 30 TABLET | Refills: 0 | Status: SHIPPED | OUTPATIENT
Start: 2024-02-01 | End: 2024-03-02

## 2024-02-01 RX ORDER — BUPROPION HYDROCHLORIDE 150 MG/1
TABLET ORAL
Qty: 180 TABLET | Refills: 2 | Status: SHIPPED | OUTPATIENT
Start: 2024-02-01

## 2024-02-01 RX ORDER — ADENOSINE 3 MG/ML
12 INJECTION, SOLUTION INTRAVENOUS ONCE
Status: COMPLETED | OUTPATIENT
Start: 2024-02-01 | End: 2024-02-01

## 2024-02-01 RX ORDER — ADENOSINE 3 MG/ML
6 INJECTION, SOLUTION INTRAVENOUS ONCE
Status: COMPLETED | OUTPATIENT
Start: 2024-02-01 | End: 2024-02-01

## 2024-02-01 RX ORDER — AMLODIPINE BESYLATE 10 MG/1
10 TABLET ORAL EVERY MORNING
Qty: 90 TABLET | Refills: 1 | Status: SHIPPED | OUTPATIENT
Start: 2024-02-01

## 2024-02-01 RX ORDER — BUDESONIDE AND FORMOTEROL FUMARATE DIHYDRATE 160; 4.5 UG/1; UG/1
2 AEROSOL RESPIRATORY (INHALATION) 2 TIMES DAILY
Qty: 10.2 G | Refills: 5 | Status: SHIPPED | OUTPATIENT
Start: 2024-02-01

## 2024-02-01 RX ADMIN — SODIUM CHLORIDE 1000 ML: 9 INJECTION, SOLUTION INTRAVENOUS at 15:37

## 2024-02-01 RX ADMIN — ADENOSINE 6 MG: 3 INJECTION, SOLUTION INTRAVENOUS at 15:38

## 2024-02-01 RX ADMIN — METOPROLOL TARTRATE 25 MG: 25 TABLET, FILM COATED ORAL at 15:39

## 2024-02-01 RX ADMIN — ADENOSINE 12 MG: 3 INJECTION, SOLUTION INTRAVENOUS at 16:20

## 2024-02-01 ASSESSMENT — PATIENT HEALTH QUESTIONNAIRE - PHQ9
2. FEELING DOWN, DEPRESSED OR HOPELESS: 1
SUM OF ALL RESPONSES TO PHQ QUESTIONS 1-9: 7
10. IF YOU CHECKED OFF ANY PROBLEMS, HOW DIFFICULT HAVE THESE PROBLEMS MADE IT FOR YOU TO DO YOUR WORK, TAKE CARE OF THINGS AT HOME, OR GET ALONG WITH OTHER PEOPLE: 1
SUM OF ALL RESPONSES TO PHQ9 QUESTIONS 1 & 2: 1
9. THOUGHTS THAT YOU WOULD BE BETTER OFF DEAD, OR OF HURTING YOURSELF: 0
SUM OF ALL RESPONSES TO PHQ QUESTIONS 1-9: 7
1. LITTLE INTEREST OR PLEASURE IN DOING THINGS: 0
SUM OF ALL RESPONSES TO PHQ QUESTIONS 1-9: 7
5. POOR APPETITE OR OVEREATING: 1
8. MOVING OR SPEAKING SO SLOWLY THAT OTHER PEOPLE COULD HAVE NOTICED. OR THE OPPOSITE, BEING SO FIGETY OR RESTLESS THAT YOU HAVE BEEN MOVING AROUND A LOT MORE THAN USUAL: 1
4. FEELING TIRED OR HAVING LITTLE ENERGY: 1
7. TROUBLE CONCENTRATING ON THINGS, SUCH AS READING THE NEWSPAPER OR WATCHING TELEVISION: 1
SUM OF ALL RESPONSES TO PHQ QUESTIONS 1-9: 7
3. TROUBLE FALLING OR STAYING ASLEEP: 1

## 2024-02-01 ASSESSMENT — ENCOUNTER SYMPTOMS
CHEST TIGHTNESS: 1
SHORTNESS OF BREATH: 1

## 2024-02-01 NOTE — ED NOTES
1538: 6mg adenosine administered at this time. Provider at bedside. 2 RNs and EDT at bedside. Patient on monitor x3 and on pads.     1539: 6mg adenosine push unsuccessful. Administering metoprolol at this time. Provider to reassess.

## 2024-02-01 NOTE — PROGRESS NOTES
Chief Complaint   Patient presents with    Follow-up     2023 Hypertension     \"Have you been to the ER, urgent care clinic since your last visit?  Hospitalized since your last visit?\"    NO    “Have you seen or consulted any other health care providers outside of [facility] since your last visit?”    NO    “Have you had a colorectal cancer screening such as a colonoscopy/FIT/Cologuard?    NO     Have you had a mammogram?”     Yes 3/9/2022 Needs order for Mammogram          Vitals:    24 1215   Pulse: (!) 139   Resp: 17   Temp: 96.9 °F (36.1 °C)   SpO2: 97%     Health Maintenance Due   Topic Date Due    Hepatitis B vaccine (1 of 3 - 3-dose series) Never done    Pneumococcal 0-64 years Vaccine (1 - PCV) Never done    Colorectal Cancer Screen  Never done    Shingles vaccine (1 of 2) Never done    Low dose CT lung screening &/or counseling  Never done    Breast cancer screen  2023      The patient, Sofia Rea, identity was verified by name and , pharmacy verified  Labs:Yes  Fasting:No         
Has 3 children; 27, 23 and 18 yo. Non of the kids live with her  Smoking - 1 ppd  Legal issues: no  Occupation: previously working at [employer] but lost her job. Looking for another job  Trauma: Youngest child's father  and it was hard on her.    Allergies - reviewed:   Allergies   Allergen Reactions    Hydromorphone Itching     morphine    Morphine Itching    Hydralazine Rash     Other reaction(s): Unknown (comments)  jitteriness    Sulfa Antibiotics Nausea And Vomiting       Past Medical History - reviewed:  Past Medical History:   Diagnosis Date    Anemia 2012    Cocaine abuse affecting pregnancy (HCC)     Essential hypertension     Hypertension     SVT (supraventricular tachycardia) 2018       Depression screening:  PHQ-9 Total Score: 7 (2024 12:20 PM)  Thoughts that you would be better off dead, or of hurting yourself in some way: 0 (2024 12:20 PM)      Review of systems:   A comprehensive review of systems was negative except for that written in the History of Present Illness.     Physical Exam  Pulse (!) 139   Temp 96.9 °F (36.1 °C) (Tympanic)   Resp 17   Ht 1.702 m (5' 7\")   Wt 85.4 kg (188 lb 4.4 oz)   SpO2 97%   BMI 29.49 kg/m²     General: Alert and oriented, in no acute distress.  EYE: PERRL. Sclera and conjuctival clear. Extraocular movements intact.  EARS: External normal, canals clear, tympanic membranes normal.   NOSE: Mucosa healthy without drainage or ulceration.  OROPHARYNX: No suspicious lesions, normal dentition, pharynx, tongue and tonsils normal.  NECK: Supple; no masses; thyroid normal.  LUNGS: Respirations unlabored; clear to auscultation bilaterally.  CARDIOVASCULAR: Tachycardia. No murmurs.  ABDOMEN: Soft; nontender; nondistended; normoactive bowel sounds; no masses or organomegaly.  MUSCULOSKELETAL: FROM in all extremities     EXT: No edema. Neurovascularlly intact. Normal gait.  Neurological: Alert and oriented X 3, normal strength and tone.

## 2024-02-01 NOTE — DISCHARGE INSTRUCTIONS
It was a pleasure taking care of you at [facility] Emergency Department today.  We know that when you come to [facility], you are entrusting us with your health, comfort, and safety.  Our physicians and nurses honor that trust, and we truly appreciate the opportunity to care for you and your loved ones.      We also value your feedback.  If you receive a survey about your Emergency Department experience today, please fill it out.  We care about our patients' feedback, and we listen to what you have to say.  Thank you!

## 2024-02-02 LAB
EKG ATRIAL RATE: 156 BPM
EKG DIAGNOSIS: NORMAL
EKG Q-T INTERVAL: 314 MS
EKG QRS DURATION: 148 MS
EKG QTC CALCULATION (BAZETT): 502 MS
EKG R AXIS: 27 DEGREES
EKG T AXIS: 74 DEGREES
EKG VENTRICULAR RATE: 154 BPM

## 2024-02-02 NOTE — ED PROVIDER NOTES
02/01/24 1616 -- (!) 149 25 (!) 89/77 --   02/01/24 1600 -- (!) 164 15 (!) 125/109 --   02/01/24 1552 -- (!) 154 -- (!) 112/90 --   02/01/24 1545 -- (!) 155 23 (!) 138/121 95 %   02/01/24 1544 -- (!) 163 22 -- 93 %   02/01/24 1543 -- (!) 160 25 (!) 135/115 (!) 76 %   02/01/24 1541 -- (!) 154 27 -- 93 %   02/01/24 1539 -- (!) 155 25 -- 94 %   02/01/24 1537 -- (!) 150 24 -- 93 %   02/01/24 1536 -- (!) 149 30 -- 94 %   02/01/24 1534 -- (!) 150 (!) 32 (!) 149/134 95 %   02/01/24 1445 97.1 °F (36.2 °C) (!) 153 28 (!) 111/99 94 %          Physical Exam  Vitals reviewed.   Constitutional:       General: She is not in acute distress.     Appearance: Normal appearance. She is not ill-appearing.   Cardiovascular:      Rate and Rhythm: Regular rhythm. Tachycardia present.   Pulmonary:      Effort: Pulmonary effort is normal. No respiratory distress.      Breath sounds: No wheezing or rhonchi.   Abdominal:      General: Abdomen is flat. There is no distension.      Palpations: Abdomen is soft.      Tenderness: There is no abdominal tenderness.   Skin:     General: Skin is warm and dry.   Neurological:      General: No focal deficit present.      Mental Status: She is alert and oriented to person, place, and time.         Diagnostic Study Results   Labs  Recent Results (from the past 24 hour(s))   EKG 12 Lead    Collection Time: 02/01/24  2:28 PM   Result Value Ref Range    Ventricular Rate 154 BPM    Atrial Rate 156 BPM    QRS Duration 148 ms    Q-T Interval 314 ms    QTc Calculation (Bazett) 502 ms    R Axis 27 degrees    T Axis 74 degrees    Diagnosis       Wide QRS tachycardia  Nonspecific intraventricular block  When compared with ECG of 30-NOV-2021 09:19,  Wide QRS tachycardia has replaced Sinus rhythm  Vent. rate has increased BY  99 BPM     CBC with Auto Differential    Collection Time: 02/01/24  3:04 PM   Result Value Ref Range    WBC 13.4 (H) 3.6 - 11.0 K/uL    RBC 4.97 3.80 - 5.20 M/uL    Hemoglobin 16.8 (H) 11.5 -  care physician today who noted her to be tachycardic and referred her to the emergency department.  She is not aware of any history of any arrhythmias such as atrial fibrillation or SVT.  No significant chest pain.  Symptoms have been present for about 2 days.  Of note she has had multiple similar episodes in the past but they usually are much briefer and resolve spontaneously.    On examination the patient was tachycardic and noted to be in SVT by her EKG.    Although she was unaware of a history of SVT, I reviewed her records and found that she had been successfully with treated with adenosine for an episode of SVT in 2018.    Patient was administered adenosine 6 mg IV push, which did not resolve her SVT.    She was then administered metoprolol 25 mg p.o., and adenosine 12 mg IV push, which completely resolved her SVT and she was back to normal sinus rhythm within seconds.    Her symptoms were fully resolved and she was discharged home.  I prescribed metoprolol 25 mg p.o. twice daily and recommended cardiology follow-up.  We also discussed smoking cessation to improve her cardiovascular health.      No concern for ACS, MI, or pulmonary embolism today.        Final Diagnosis:   1. Paroxysmal supraventricular tachycardia    2. Hypokalemia        Additional documentation if relevant for this encounter         Tobacco use and counseling:    Tobacco Use      Smoking status: Every Day        Packs/day: 1.00        Years: 1 pack/day for 30.0 years (30.0 ttl pk-yrs)        Types: Cigarettes        Passive exposure: Past      Smokeless tobacco: Never     Smoking Cessation Discussion?  TOBACCO COUNSELING:  Upon evaluation, the patient expressed that they are a current tobacco user. For 4 minutes, I counseled the patient on the hazards of smoking.    The patient was encouraged to quit as soon as possible in order to decrease further risks to their health.   We discussed nicotine replacement therapies and medical treatment

## 2024-02-29 ENCOUNTER — OFFICE VISIT (OUTPATIENT)
Age: 54
End: 2024-02-29
Payer: COMMERCIAL

## 2024-02-29 VITALS
RESPIRATION RATE: 17 BRPM | SYSTOLIC BLOOD PRESSURE: 161 MMHG | BODY MASS INDEX: 29.13 KG/M2 | WEIGHT: 185.63 LBS | DIASTOLIC BLOOD PRESSURE: 106 MMHG | HEIGHT: 67 IN | OXYGEN SATURATION: 97 % | HEART RATE: 72 BPM | TEMPERATURE: 96.9 F

## 2024-02-29 DIAGNOSIS — I1A.0 RESISTANT HYPERTENSION: ICD-10-CM

## 2024-02-29 DIAGNOSIS — I47.10 SVT (SUPRAVENTRICULAR TACHYCARDIA): ICD-10-CM

## 2024-02-29 DIAGNOSIS — Z91.199 NONCOMPLIANCE: ICD-10-CM

## 2024-02-29 DIAGNOSIS — F41.1 GAD (GENERALIZED ANXIETY DISORDER): Primary | ICD-10-CM

## 2024-02-29 PROCEDURE — 99213 OFFICE O/P EST LOW 20 MIN: CPT | Performed by: STUDENT IN AN ORGANIZED HEALTH CARE EDUCATION/TRAINING PROGRAM

## 2024-02-29 PROCEDURE — 3080F DIAST BP >= 90 MM HG: CPT | Performed by: STUDENT IN AN ORGANIZED HEALTH CARE EDUCATION/TRAINING PROGRAM

## 2024-02-29 PROCEDURE — 3077F SYST BP >= 140 MM HG: CPT | Performed by: STUDENT IN AN ORGANIZED HEALTH CARE EDUCATION/TRAINING PROGRAM

## 2024-02-29 RX ORDER — HYDROXYZINE HYDROCHLORIDE 25 MG/1
25 TABLET, FILM COATED ORAL EVERY 8 HOURS PRN
Qty: 90 TABLET | Refills: 0 | Status: SHIPPED | OUTPATIENT
Start: 2024-02-29 | End: 2024-05-29

## 2024-02-29 RX ORDER — LOSARTAN POTASSIUM 50 MG/1
50 TABLET ORAL EVERY MORNING
Qty: 90 TABLET | Refills: 1
Start: 2024-02-29

## 2024-02-29 NOTE — PROGRESS NOTES
Chief Complaint   Patient presents with    Follow-up     4 weeks 2024 Tachycardia        Patient was put on metoprolol tartrate (LOPRESSOR) 25 MG tablet but has not taken it as of yet . Wanted to consult with PCP first.         \"Have you been to the ER, urgent care clinic since your last visit?  Hospitalized since your last visit?\"      Yes  2024 (2 hours)  [facility]  Paroxysmal supraventricular tachycardia   “Have you seen or consulted any other health care providers outside of [facility] since your last visit?”    NO  Has an appt. [facility]  3/19/2024    Vitals:    24 1419   BP: (!) 161/106   Pulse: 72   Resp: 17   Temp: 96.9 °F (36.1 °C)   SpO2: 97%     Health Maintenance Due   Topic Date Due    Hepatitis B vaccine (1 of 3 - 3-dose series) Never done    Pneumococcal 0-64 years Vaccine (1 - PCV) Never done    Colorectal Cancer Screen  Never done    Shingles vaccine (1 of 2) Never done    Low dose CT lung screening &/or counseling  Never done    Breast cancer screen  2023      The patient, Sofia Rea, identity was verified by name and , pharmacy verified  Labs:Yes  Fasting:No         
tablet by mouth every 8 hours as needed for Anxiety.    2. Resistant hypertension  Likely due to noncompliance  - amLODIPine (NORVASC) 10 MG tablet; Take 1 tablet by mouth every morning   - chlorthalidone (HYGROTON) 25 MG tablet; Take 1 tablet by mouth daily   - losartan (COZAAR) 50 MG tablet; Take 1 tablet by mouth every morning  Dispense: 90 tablet; Refill: 1    3. SVT (supraventricular tachycardia)  Encouraged to take lopressor and follow up with cards  - metoprolol tartrate (LOPRESSOR) 25 MG tablet; Take 1 tablet by mouth 2 times daily  Dispense: 180 tablet; Refill: 1    4. Noncompliance  I explained to patient that it is very important to comply with our treatment plans in order to prevent further health complications. Also explained that chronic/repeated non-compliance could jeopardize of our physician-patient relationship. Pt expresses understanding.      Follow up: 6 weeks. RTC to clinic sooner should symptoms persist, worsen or fail to improve as anticipated.    On this date 02/29/24 I have spent 25 minutes reviewing previous notes, test results and face to face with the patient discussing the diagnosis and importance of compliance with the treatment plan as well as documenting on the day of the visit.       We discussed the expected course, resolution and complications of the diagnosis(es) in detail.  Medication risks, benefits, costs, interactions, and alternatives were discussed as indicated.  I advised to contact the office if his condition worsens, changes or fails to improve as anticipated. Pt expressed understanding with the diagnosis(es) and plan. Patient understands that this encounter was a temporary measure, and the importance of further follow up and examination was emphasized.  Patient verbalized understanding.      Signed By: Jose Maria Lopez MD     February 29, 2024

## 2024-02-29 NOTE — PATIENT INSTRUCTIONS
YOUR BLOOD PRESSURE IS HIGH:      - GOAL IS FOR YOUR BLOOD PRESSURE TO BE BELOW 140/90    - CONTINUE TAKING YOUR MEDICATIONS AS PRESCRIBED;     - MAKE SURE TO LIMIT SALT IN THE DIET (LESS THAN 2400 mg/day)    - YOU NEED AT LEAST 6 HOURS OF UNINTERRUPTED SLEEP    - MAKE SURE TO MONITOR YOUR DIET TO AVOID EXCESSIVE CARBS , CHOLESTEROL AND FATS. EAT MORE FRUITS AND VEGETABLES    - LIMIT ALCOHOL INTAKE.    - ADVISE TO QUIT SMOKING     - AVOID EXCESSIVE MEDICATIONS SUCH AS PSEUDOEPHEDRINE, IBUPROFEN OR ALEVE    - CONSIDER WEIGHT LOSS     - ROUTINE DAILY EXERCISE: AT LEAST 30 MINUTES WALK, ABOUT 5 TIMES PER WEEK.    - WILL RULE OUT FOR SLEEP APNEA

## 2024-05-03 ENCOUNTER — APPOINTMENT (OUTPATIENT)
Facility: HOSPITAL | Age: 54
End: 2024-05-03
Payer: COMMERCIAL

## 2024-05-03 ENCOUNTER — HOSPITAL ENCOUNTER (EMERGENCY)
Facility: HOSPITAL | Age: 54
Discharge: HOME | End: 2024-05-03
Attending: STUDENT IN AN ORGANIZED HEALTH CARE EDUCATION/TRAINING PROGRAM
Payer: COMMERCIAL

## 2024-05-03 VITALS
HEART RATE: 68 BPM | TEMPERATURE: 97.8 F | RESPIRATION RATE: 18 BRPM | SYSTOLIC BLOOD PRESSURE: 131 MMHG | OXYGEN SATURATION: 94 % | DIASTOLIC BLOOD PRESSURE: 70 MMHG | BODY MASS INDEX: 29.07 KG/M2 | HEIGHT: 67 IN | WEIGHT: 185.19 LBS

## 2024-05-03 DIAGNOSIS — S41.111A ARM LACERATION, RIGHT, INITIAL ENCOUNTER: ICD-10-CM
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
## 2024-05-03 PROCEDURE — 70450 CT HEAD/BRAIN W/O DYE: CPT

## 2024-05-03 PROCEDURE — 99284 EMERGENCY DEPT VISIT MOD MDM: CPT

## 2024-05-03 PROCEDURE — 73060 X-RAY EXAM OF HUMERUS: CPT

## 2024-05-03 PROCEDURE — 72125 CT NECK SPINE W/O DYE: CPT

## 2024-05-03 PROCEDURE — 12002 RPR S/N/AX/GEN/TRNK2.6-7.5CM: CPT

## 2024-05-03 PROCEDURE — 2500000003 HC RX 250 WO HCPCS: Performed by: EMERGENCY MEDICINE

## 2024-05-03 RX ORDER — LIDOCAINE HYDROCHLORIDE AND EPINEPHRINE 10; 10 MG/ML; UG/ML
20 INJECTION, SOLUTION INFILTRATION; PERINEURAL ONCE
Status: COMPLETED | OUTPATIENT
Start: 2024-05-03 | End: 2024-05-03

## 2024-05-03 RX ADMIN — LIDOCAINE HYDROCHLORIDE,EPINEPHRINE BITARTRATE 20 ML: 10; .01 INJECTION, SOLUTION INFILTRATION; PERINEURAL at 10:35

## 2024-05-03 ASSESSMENT — ENCOUNTER SYMPTOMS
VOMITING: 0
NAUSEA: 0
SHORTNESS OF BREATH: 0
BACK PAIN: 0
TROUBLE SWALLOWING: 0
COUGH: 0
ABDOMINAL PAIN: 0

## 2024-05-03 ASSESSMENT — PAIN - FUNCTIONAL ASSESSMENT: PAIN_FUNCTIONAL_ASSESSMENT: PREVENTS OR INTERFERES SOME ACTIVE ACTIVITIES AND ADLS

## 2024-05-03 ASSESSMENT — PAIN DESCRIPTION - ORIENTATION: ORIENTATION: RIGHT;UPPER

## 2024-05-03 ASSESSMENT — PAIN SCALES - GENERAL: PAINLEVEL_OUTOF10: 9

## 2024-05-03 ASSESSMENT — PAIN DESCRIPTION - PAIN TYPE: TYPE: ACUTE PAIN

## 2024-05-03 ASSESSMENT — PAIN DESCRIPTION - ONSET: ONSET: ON-GOING

## 2024-05-03 ASSESSMENT — PAIN DESCRIPTION - LOCATION: LOCATION: ARM

## 2024-05-03 ASSESSMENT — PAIN DESCRIPTION - FREQUENCY: FREQUENCY: CONTINUOUS

## 2024-05-03 ASSESSMENT — PAIN DESCRIPTION - DESCRIPTORS: DESCRIPTORS: ACHING

## 2024-05-03 NOTE — ED NOTES
Discharged by Joi GARCIA with papers in hand.  Patient reported they didn't have a phone or car to get home.  This nurse offered to help her get home. The patient stated she would walk home it's only a few blocks away.    
Family history of suicide/Highly impulsive behavior

## 2024-05-03 NOTE — ED PROVIDER NOTES
above the remainder of the review of systems was reviewed and negative.       PAST MEDICAL HISTORY     Past Medical History:   Diagnosis Date    Anemia 2012    Cocaine abuse affecting pregnancy (HCC)     Essential hypertension     Hypertension     SVT (supraventricular tachycardia) 2018         SURGICAL HISTORY       Past Surgical History:   Procedure Laterality Date     SECTION      CHOLECYSTECTOMY      PELVIC LAPAROSCOPY      ovarian cystectomy    TONSILLECTOMY           CURRENT MEDICATIONS       Discharge Medication List as of 5/3/2024 11:51 AM        CONTINUE these medications which have NOT CHANGED    Details   hydrOXYzine HCl (ATARAX) 25 MG tablet Take 1 tablet by mouth every 8 hours as needed for Anxiety, Disp-90 tablet, R-0Normal      losartan (COZAAR) 50 MG tablet Take 1 tablet by mouth every morning, Disp-90 tablet, R-1NO PRINT      metoprolol tartrate (LOPRESSOR) 25 MG tablet Take 1 tablet by mouth 2 times daily, Disp-180 tablet, R-1NO PRINT      amLODIPine (NORVASC) 10 MG tablet Take 1 tablet by mouth every morning, Disp-90 tablet, R-1Normal      chlorthalidone (HYGROTON) 25 MG tablet Take 1 tablet by mouth daily, Disp-90 tablet, R-2Normal      budesonide-formoterol (SYMBICORT) 160-4.5 MCG/ACT AERO Inhale 2 puffs into the lungs 2 times daily, Disp-10.2 g, R-5Normal      buPROPion (WELLBUTRIN XL) 150 MG extended release tablet Take 2 tablets Once a day, Disp-180 tablet, R-2Normal      potassium chloride (KLOR-CON M) 20 MEQ extended release tablet Take 1 tablet by mouth daily, Disp-30 tablet, R-0Normal      albuterol sulfate HFA (PROVENTIL;VENTOLIN;PROAIR) 108 (90 Base) MCG/ACT inhaler Inhale 2 puffs into the lungs every 6 hours as neededHistorical Med             ALLERGIES     Hydromorphone, Morphine, Hydralazine, and Sulfa antibiotics    FAMILY HISTORY       Family History   Problem Relation Age of Onset    Hypertension Father     Alcohol Abuse Father     Breast Cancer Sister 45    No

## 2024-05-08 DIAGNOSIS — F41.1 GAD (GENERALIZED ANXIETY DISORDER): ICD-10-CM

## 2024-05-08 RX ORDER — HYDROXYZINE HYDROCHLORIDE 25 MG/1
25 TABLET, FILM COATED ORAL EVERY 8 HOURS PRN
Qty: 90 TABLET | Refills: 0 | Status: SHIPPED | OUTPATIENT
Start: 2024-05-08 | End: 2024-08-06

## 2024-05-08 NOTE — TELEPHONE ENCOUNTER
Last appointment: 2/29/24  Next appointment: 8/30/24  Previous refill encounter(s): 2/29/24 #90    Requested Prescriptions     Pending Prescriptions Disp Refills    hydrOXYzine HCl (ATARAX) 25 MG tablet 90 tablet 0     Sig: Take 1 tablet by mouth every 8 hours as needed for Anxiety         For Pharmacy Admin Tracking Only    Program: Medication Refill  CPA in place:    Recommendation Provided To:   Intervention Detail: New Rx: 1, reason: Patient Preference  Intervention Accepted By:   Gap Closed?:    Time Spent (min): 5

## 2024-05-15 ENCOUNTER — TELEPHONE (OUTPATIENT)
Age: 54
End: 2024-05-15

## 2024-05-15 NOTE — TELEPHONE ENCOUNTER
Patient would like to get a ER fu appointment for a fall soon with  she can be reached @ [phone]

## 2024-05-17 NOTE — TELEPHONE ENCOUNTER
Patient was contacted unable to reach. Voicemail left for a return call to confirm appointment on 5/24/24 at 2pm. Dr. Lopez gave approval for appointment on 5/24/24 at 2pm. Awaiting a return call.

## 2024-08-03 ENCOUNTER — APPOINTMENT (OUTPATIENT)
Facility: HOSPITAL | Age: 54
DRG: 139 | End: 2024-08-03
Payer: COMMERCIAL

## 2024-08-03 ENCOUNTER — HOSPITAL ENCOUNTER (INPATIENT)
Facility: HOSPITAL | Age: 54
LOS: 2 days | Discharge: HOME | DRG: 139 | End: 2024-08-05
Attending: EMERGENCY MEDICINE | Admitting: HOSPITALIST
Payer: COMMERCIAL

## 2024-08-03 DIAGNOSIS — R73.03 PREDIABETES: Primary | ICD-10-CM

## 2024-08-03 DIAGNOSIS — J18.9 PNEUMONIA OF LEFT LOWER LOBE DUE TO INFECTIOUS ORGANISM: ICD-10-CM

## 2024-08-03 DIAGNOSIS — J96.01 ACUTE RESPIRATORY FAILURE WITH HYPOXIA (HCC): ICD-10-CM

## 2024-08-03 LAB
ALBUMIN SERPL-MCNC: 3.3 G/DL (ref 3.5–5)
ALBUMIN/GLOB SERPL: 0.8 (ref 1.1–2.2)
ALP SERPL-CCNC: 81 U/L (ref 45–117)
ALT SERPL-CCNC: 22 U/L (ref 12–78)
AMPHET UR QL SCN: POSITIVE
ANION GAP SERPL CALC-SCNC: 6 MMOL/L (ref 5–15)
AST SERPL-CCNC: 14 U/L (ref 15–37)
BARBITURATES UR QL SCN: NEGATIVE
BASOPHILS # BLD: 0.1 K/UL (ref 0–0.1)
BASOPHILS NFR BLD: 0 % (ref 0–1)
BENZODIAZ UR QL: NEGATIVE
BILIRUB SERPL-MCNC: 0.7 MG/DL (ref 0.2–1)
BUN SERPL-MCNC: 19 MG/DL (ref 6–20)
BUN/CREAT SERPL: 14 (ref 12–20)
CALCIUM SERPL-MCNC: 9.3 MG/DL (ref 8.5–10.1)
CANNABINOIDS UR QL SCN: NEGATIVE
CHLORIDE SERPL-SCNC: 98 MMOL/L (ref 97–108)
CO2 SERPL-SCNC: 35 MMOL/L (ref 21–32)
COCAINE UR QL SCN: POSITIVE
CREAT SERPL-MCNC: 1.37 MG/DL (ref 0.55–1.02)
DIFFERENTIAL METHOD BLD: ABNORMAL
EOSINOPHIL # BLD: 0.2 K/UL (ref 0–0.4)
EOSINOPHIL NFR BLD: 1 % (ref 0–7)
ERYTHROCYTE [DISTWIDTH] IN BLOOD BY AUTOMATED COUNT: 12.4 % (ref 11.5–14.5)
EST. AVERAGE GLUCOSE BLD GHB EST-MCNC: 140 MG/DL
FLUAV RNA SPEC QL NAA+PROBE: NOT DETECTED
FLUBV RNA SPEC QL NAA+PROBE: NOT DETECTED
GLOBULIN SER CALC-MCNC: 4.4 G/DL (ref 2–4)
GLUCOSE SERPL-MCNC: 173 MG/DL (ref 65–100)
HBA1C MFR BLD: 6.5 % (ref 4–5.6)
HCT VFR BLD AUTO: 39.6 % (ref 35–47)
HGB BLD-MCNC: 13.8 G/DL (ref 11.5–16)
IMM GRANULOCYTES # BLD AUTO: 0.1 K/UL (ref 0–0.04)
IMM GRANULOCYTES NFR BLD AUTO: 0 % (ref 0–0.5)
LACTATE SERPL-SCNC: 1.4 MMOL/L (ref 0.4–2)
LYMPHOCYTES # BLD: 2.1 K/UL (ref 0.8–3.5)
LYMPHOCYTES NFR BLD: 15 % (ref 12–49)
Lab: ABNORMAL
MCH RBC QN AUTO: 33.6 PG (ref 26–34)
MCHC RBC AUTO-ENTMCNC: 34.8 G/DL (ref 30–36.5)
MCV RBC AUTO: 96.4 FL (ref 80–99)
METHADONE UR QL: NEGATIVE
MONOCYTES # BLD: 0.8 K/UL (ref 0–1)
MONOCYTES NFR BLD: 6 % (ref 5–13)
NEUTS SEG # BLD: 11.4 K/UL (ref 1.8–8)
NEUTS SEG NFR BLD: 78 % (ref 32–75)
NRBC # BLD: 0 K/UL (ref 0–0.01)
NRBC BLD-RTO: 0 PER 100 WBC
NT PRO BNP: 117 PG/ML (ref 0–125)
OPIATES UR QL: NEGATIVE
PCP UR QL: NEGATIVE
PLATELET # BLD AUTO: 315 K/UL (ref 150–400)
PMV BLD AUTO: 10 FL (ref 8.9–12.9)
POTASSIUM SERPL-SCNC: 3.4 MMOL/L (ref 3.5–5.1)
PROCALCITONIN SERPL-MCNC: <0.05 NG/ML
PROT SERPL-MCNC: 7.7 G/DL (ref 6.4–8.2)
RBC # BLD AUTO: 4.11 M/UL (ref 3.8–5.2)
SARS-COV-2 RNA RESP QL NAA+PROBE: NOT DETECTED
SODIUM SERPL-SCNC: 139 MMOL/L (ref 136–145)
TROPONIN I SERPL HS-MCNC: 9 NG/L (ref 0–51)
WBC # BLD AUTO: 14.7 K/UL (ref 3.6–11)

## 2024-08-03 PROCEDURE — 80053 COMPREHEN METABOLIC PANEL: CPT

## 2024-08-03 PROCEDURE — 85025 COMPLETE CBC W/AUTO DIFF WBC: CPT

## 2024-08-03 PROCEDURE — 2580000003 HC RX 258: Performed by: EMERGENCY MEDICINE

## 2024-08-03 PROCEDURE — 80307 DRUG TEST PRSMV CHEM ANLYZR: CPT

## 2024-08-03 PROCEDURE — 84145 PROCALCITONIN (PCT): CPT

## 2024-08-03 PROCEDURE — 36415 COLL VENOUS BLD VENIPUNCTURE: CPT

## 2024-08-03 PROCEDURE — 96365 THER/PROPH/DIAG IV INF INIT: CPT

## 2024-08-03 PROCEDURE — 1200000000 HC SEMI PRIVATE

## 2024-08-03 PROCEDURE — 87449 NOS EACH ORGANISM AG IA: CPT

## 2024-08-03 PROCEDURE — 83036 HEMOGLOBIN GLYCOSYLATED A1C: CPT

## 2024-08-03 PROCEDURE — 86738 MYCOPLASMA ANTIBODY: CPT

## 2024-08-03 PROCEDURE — 6370000000 HC RX 637 (ALT 250 FOR IP): Performed by: EMERGENCY MEDICINE

## 2024-08-03 PROCEDURE — 94760 N-INVAS EAR/PLS OXIMETRY 1: CPT

## 2024-08-03 PROCEDURE — 6360000004 HC RX CONTRAST MEDICATION: Performed by: EMERGENCY MEDICINE

## 2024-08-03 PROCEDURE — 2500000003 HC RX 250 WO HCPCS: Performed by: INTERNAL MEDICINE

## 2024-08-03 PROCEDURE — 6370000000 HC RX 637 (ALT 250 FOR IP): Performed by: INTERNAL MEDICINE

## 2024-08-03 PROCEDURE — 6360000002 HC RX W HCPCS: Performed by: HOSPITALIST

## 2024-08-03 PROCEDURE — 96375 TX/PRO/DX INJ NEW DRUG ADDON: CPT

## 2024-08-03 PROCEDURE — 2580000003 HC RX 258: Performed by: HOSPITALIST

## 2024-08-03 PROCEDURE — 6370000000 HC RX 637 (ALT 250 FOR IP): Performed by: HOSPITALIST

## 2024-08-03 PROCEDURE — 83605 ASSAY OF LACTIC ACID: CPT

## 2024-08-03 PROCEDURE — 83880 ASSAY OF NATRIURETIC PEPTIDE: CPT

## 2024-08-03 PROCEDURE — 94640 AIRWAY INHALATION TREATMENT: CPT

## 2024-08-03 PROCEDURE — 87636 SARSCOV2 & INF A&B AMP PRB: CPT

## 2024-08-03 PROCEDURE — 6360000002 HC RX W HCPCS: Performed by: EMERGENCY MEDICINE

## 2024-08-03 PROCEDURE — 99285 EMERGENCY DEPT VISIT HI MDM: CPT

## 2024-08-03 PROCEDURE — 93005 ELECTROCARDIOGRAM TRACING: CPT | Performed by: EMERGENCY MEDICINE

## 2024-08-03 PROCEDURE — 84484 ASSAY OF TROPONIN QUANT: CPT

## 2024-08-03 PROCEDURE — 71275 CT ANGIOGRAPHY CHEST: CPT

## 2024-08-03 RX ORDER — LANOLIN ALCOHOL/MO/W.PET/CERES
6 CREAM (GRAM) TOPICAL NIGHTLY PRN
Status: DISCONTINUED | OUTPATIENT
Start: 2024-08-03 | End: 2024-08-05 | Stop reason: HOSPADM

## 2024-08-03 RX ORDER — UBIDECARENONE 75 MG
100 CAPSULE ORAL DAILY
COMMUNITY

## 2024-08-03 RX ORDER — MAGNESIUM HYDROXIDE/ALUMINUM HYDROXICE/SIMETHICONE 120; 1200; 1200 MG/30ML; MG/30ML; MG/30ML
30 SUSPENSION ORAL EVERY 6 HOURS PRN
Status: DISCONTINUED | OUTPATIENT
Start: 2024-08-03 | End: 2024-08-05 | Stop reason: HOSPADM

## 2024-08-03 RX ORDER — POLYETHYLENE GLYCOL 3350 17 G/17G
17 POWDER, FOR SOLUTION ORAL DAILY PRN
Status: DISCONTINUED | OUTPATIENT
Start: 2024-08-03 | End: 2024-08-05 | Stop reason: HOSPADM

## 2024-08-03 RX ORDER — KETOROLAC TROMETHAMINE 30 MG/ML
15 INJECTION, SOLUTION INTRAMUSCULAR; INTRAVENOUS ONCE
Status: COMPLETED | OUTPATIENT
Start: 2024-08-03 | End: 2024-08-03

## 2024-08-03 RX ORDER — ONDANSETRON 4 MG/1
4 TABLET, ORALLY DISINTEGRATING ORAL EVERY 8 HOURS PRN
Status: DISCONTINUED | OUTPATIENT
Start: 2024-08-03 | End: 2024-08-05 | Stop reason: HOSPADM

## 2024-08-03 RX ORDER — FENTANYL CITRATE 50 UG/ML
50 INJECTION, SOLUTION INTRAMUSCULAR; INTRAVENOUS
Status: COMPLETED | OUTPATIENT
Start: 2024-08-03 | End: 2024-08-03

## 2024-08-03 RX ORDER — SODIUM CHLORIDE 9 MG/ML
INJECTION, SOLUTION INTRAVENOUS PRN
Status: DISCONTINUED | OUTPATIENT
Start: 2024-08-03 | End: 2024-08-05 | Stop reason: HOSPADM

## 2024-08-03 RX ORDER — SODIUM CHLORIDE, SODIUM LACTATE, POTASSIUM CHLORIDE, CALCIUM CHLORIDE 600; 310; 30; 20 MG/100ML; MG/100ML; MG/100ML; MG/100ML
INJECTION, SOLUTION INTRAVENOUS ONCE
Status: DISCONTINUED | OUTPATIENT
Start: 2024-08-03 | End: 2024-08-05 | Stop reason: HOSPADM

## 2024-08-03 RX ORDER — IPRATROPIUM BROMIDE AND ALBUTEROL SULFATE 2.5; .5 MG/3ML; MG/3ML
1 SOLUTION RESPIRATORY (INHALATION)
Status: COMPLETED | OUTPATIENT
Start: 2024-08-03 | End: 2024-08-03

## 2024-08-03 RX ORDER — ACETAMINOPHEN 325 MG/1
650 TABLET ORAL EVERY 6 HOURS PRN
Status: DISCONTINUED | OUTPATIENT
Start: 2024-08-03 | End: 2024-08-04

## 2024-08-03 RX ORDER — ENOXAPARIN SODIUM 100 MG/ML
40 INJECTION SUBCUTANEOUS DAILY
Status: DISCONTINUED | OUTPATIENT
Start: 2024-08-03 | End: 2024-08-05 | Stop reason: HOSPADM

## 2024-08-03 RX ORDER — POTASSIUM CHLORIDE 7.45 MG/ML
10 INJECTION INTRAVENOUS PRN
Status: DISCONTINUED | OUTPATIENT
Start: 2024-08-03 | End: 2024-08-05 | Stop reason: HOSPADM

## 2024-08-03 RX ORDER — IPRATROPIUM BROMIDE AND ALBUTEROL SULFATE 2.5; .5 MG/3ML; MG/3ML
1 SOLUTION RESPIRATORY (INHALATION) EVERY 4 HOURS PRN
Status: DISCONTINUED | OUTPATIENT
Start: 2024-08-03 | End: 2024-08-05 | Stop reason: HOSPADM

## 2024-08-03 RX ORDER — MAGNESIUM SULFATE IN WATER 40 MG/ML
2000 INJECTION, SOLUTION INTRAVENOUS PRN
Status: DISCONTINUED | OUTPATIENT
Start: 2024-08-03 | End: 2024-08-05 | Stop reason: HOSPADM

## 2024-08-03 RX ORDER — DILTIAZEM HYDROCHLORIDE 5 MG/ML
5 INJECTION INTRAVENOUS ONCE
Status: COMPLETED | OUTPATIENT
Start: 2024-08-03 | End: 2024-08-03

## 2024-08-03 RX ORDER — IBUPROFEN 200 MG
400 TABLET ORAL EVERY 6 HOURS PRN
Status: ON HOLD | COMMUNITY
End: 2024-08-05 | Stop reason: HOSPADM

## 2024-08-03 RX ORDER — BUPROPION HYDROCHLORIDE 150 MG/1
150 TABLET ORAL DAILY
Status: DISCONTINUED | OUTPATIENT
Start: 2024-08-03 | End: 2024-08-05 | Stop reason: HOSPADM

## 2024-08-03 RX ORDER — ACETAMINOPHEN 650 MG/1
650 SUPPOSITORY RECTAL EVERY 6 HOURS PRN
Status: DISCONTINUED | OUTPATIENT
Start: 2024-08-03 | End: 2024-08-04

## 2024-08-03 RX ORDER — SODIUM CHLORIDE 0.9 % (FLUSH) 0.9 %
5-40 SYRINGE (ML) INJECTION PRN
Status: DISCONTINUED | OUTPATIENT
Start: 2024-08-03 | End: 2024-08-05 | Stop reason: HOSPADM

## 2024-08-03 RX ORDER — GUAIFENESIN/DEXTROMETHORPHAN 100-10MG/5
5 SYRUP ORAL EVERY 4 HOURS PRN
Status: DISCONTINUED | OUTPATIENT
Start: 2024-08-03 | End: 2024-08-05 | Stop reason: HOSPADM

## 2024-08-03 RX ORDER — ONDANSETRON 2 MG/ML
4 INJECTION INTRAMUSCULAR; INTRAVENOUS EVERY 6 HOURS PRN
Status: DISCONTINUED | OUTPATIENT
Start: 2024-08-03 | End: 2024-08-05 | Stop reason: HOSPADM

## 2024-08-03 RX ORDER — POTASSIUM CHLORIDE 750 MG/1
40 TABLET, FILM COATED, EXTENDED RELEASE ORAL PRN
Status: DISCONTINUED | OUTPATIENT
Start: 2024-08-03 | End: 2024-08-05 | Stop reason: HOSPADM

## 2024-08-03 RX ORDER — SODIUM CHLORIDE, SODIUM LACTATE, POTASSIUM CHLORIDE, AND CALCIUM CHLORIDE .6; .31; .03; .02 G/100ML; G/100ML; G/100ML; G/100ML
250 INJECTION, SOLUTION INTRAVENOUS ONCE
Status: COMPLETED | OUTPATIENT
Start: 2024-08-03 | End: 2024-08-03

## 2024-08-03 RX ORDER — SODIUM CHLORIDE 0.9 % (FLUSH) 0.9 %
5-40 SYRINGE (ML) INJECTION EVERY 12 HOURS SCHEDULED
Status: DISCONTINUED | OUTPATIENT
Start: 2024-08-03 | End: 2024-08-05 | Stop reason: HOSPADM

## 2024-08-03 RX ADMIN — ALUMINUM HYDROXIDE, MAGNESIUM HYDROXIDE, AND SIMETHICONE 40 ML: 1200; 120; 1200 SUSPENSION ORAL at 03:44

## 2024-08-03 RX ADMIN — IOPAMIDOL 100 ML: 755 INJECTION, SOLUTION INTRAVENOUS at 05:25

## 2024-08-03 RX ADMIN — IPRATROPIUM BROMIDE AND ALBUTEROL SULFATE 1 DOSE: .5; 3 SOLUTION RESPIRATORY (INHALATION) at 04:23

## 2024-08-03 RX ADMIN — DILTIAZEM HYDROCHLORIDE 5 MG: 5 INJECTION, SOLUTION INTRAVENOUS at 17:24

## 2024-08-03 RX ADMIN — METOPROLOL TARTRATE 12.5 MG: 25 TABLET, FILM COATED ORAL at 11:20

## 2024-08-03 RX ADMIN — FENTANYL CITRATE 50 MCG: 50 INJECTION, SOLUTION INTRAMUSCULAR; INTRAVENOUS at 03:44

## 2024-08-03 RX ADMIN — BUPROPION HYDROCHLORIDE 150 MG: 150 TABLET, EXTENDED RELEASE ORAL at 11:20

## 2024-08-03 RX ADMIN — KETOROLAC TROMETHAMINE 15 MG: 30 INJECTION, SOLUTION INTRAMUSCULAR at 05:49

## 2024-08-03 RX ADMIN — SODIUM CHLORIDE, PRESERVATIVE FREE 10 ML: 5 INJECTION INTRAVENOUS at 20:14

## 2024-08-03 RX ADMIN — Medication 6 MG: at 20:13

## 2024-08-03 RX ADMIN — SODIUM CHLORIDE, POTASSIUM CHLORIDE, SODIUM LACTATE AND CALCIUM CHLORIDE 250 ML: 600; 310; 30; 20 INJECTION, SOLUTION INTRAVENOUS at 08:07

## 2024-08-03 RX ADMIN — ACETAMINOPHEN 650 MG: 325 TABLET ORAL at 20:13

## 2024-08-03 RX ADMIN — ARFORMOTEROL TARTRATE: 15 SOLUTION RESPIRATORY (INHALATION) at 19:46

## 2024-08-03 RX ADMIN — METOPROLOL TARTRATE 12.5 MG: 25 TABLET, FILM COATED ORAL at 20:13

## 2024-08-03 RX ADMIN — METOPROLOL TARTRATE 12.5 MG: 25 TABLET, FILM COATED ORAL at 16:29

## 2024-08-03 RX ADMIN — CEFTRIAXONE SODIUM 1000 MG: 1 INJECTION, POWDER, FOR SOLUTION INTRAMUSCULAR; INTRAVENOUS at 05:32

## 2024-08-03 RX ADMIN — IPRATROPIUM BROMIDE AND ALBUTEROL SULFATE 1 DOSE: .5; 3 SOLUTION RESPIRATORY (INHALATION) at 05:51

## 2024-08-03 RX ADMIN — WATER 125 MG: 1 INJECTION INTRAMUSCULAR; INTRAVENOUS; SUBCUTANEOUS at 05:33

## 2024-08-03 RX ADMIN — ARFORMOTEROL TARTRATE: 15 SOLUTION RESPIRATORY (INHALATION) at 08:35

## 2024-08-03 RX ADMIN — SODIUM CHLORIDE, PRESERVATIVE FREE 10 ML: 5 INJECTION INTRAVENOUS at 08:09

## 2024-08-03 RX ADMIN — ENOXAPARIN SODIUM 40 MG: 100 INJECTION SUBCUTANEOUS at 11:20

## 2024-08-03 RX ADMIN — POTASSIUM CHLORIDE 40 MEQ: 750 TABLET, EXTENDED RELEASE ORAL at 11:20

## 2024-08-03 RX ADMIN — AZITHROMYCIN MONOHYDRATE 500 MG: 500 INJECTION, POWDER, LYOPHILIZED, FOR SOLUTION INTRAVENOUS at 06:13

## 2024-08-03 ASSESSMENT — PAIN DESCRIPTION - ONSET
ONSET: ON-GOING
ONSET: ON-GOING

## 2024-08-03 ASSESSMENT — PAIN DESCRIPTION - LOCATION
LOCATION: RIB CAGE;CHEST
LOCATION: CHEST
LOCATION: RIB CAGE;CHEST
LOCATION: RIB CAGE;CHEST

## 2024-08-03 ASSESSMENT — PAIN SCALES - GENERAL
PAINLEVEL_OUTOF10: 4
PAINLEVEL_OUTOF10: 4
PAINLEVEL_OUTOF10: 7
PAINLEVEL_OUTOF10: 3
PAINLEVEL_OUTOF10: 4

## 2024-08-03 ASSESSMENT — PAIN DESCRIPTION - ORIENTATION
ORIENTATION: RIGHT;LEFT

## 2024-08-03 ASSESSMENT — LIFESTYLE VARIABLES
HOW MANY STANDARD DRINKS CONTAINING ALCOHOL DO YOU HAVE ON A TYPICAL DAY: 1 OR 2
HOW OFTEN DO YOU HAVE A DRINK CONTAINING ALCOHOL: MONTHLY OR LESS

## 2024-08-03 ASSESSMENT — PAIN DESCRIPTION - FREQUENCY
FREQUENCY: CONTINUOUS
FREQUENCY: CONTINUOUS

## 2024-08-03 ASSESSMENT — PAIN DESCRIPTION - DESCRIPTORS
DESCRIPTORS: DISCOMFORT
DESCRIPTORS: DISCOMFORT
DESCRIPTORS: STABBING
DESCRIPTORS: ACHING

## 2024-08-03 ASSESSMENT — PAIN - FUNCTIONAL ASSESSMENT
PAIN_FUNCTIONAL_ASSESSMENT: ACTIVITIES ARE NOT PREVENTED
PAIN_FUNCTIONAL_ASSESSMENT: 0-10

## 2024-08-03 ASSESSMENT — PAIN DESCRIPTION - PAIN TYPE
TYPE: ACUTE PAIN
TYPE: ACUTE PAIN

## 2024-08-03 NOTE — CARE COORDINATION
RUR:TBD       BRITANY opened case for assessment of D/C planning needs, CM reviewed chart.    CM reviewed chart. CM completed assessment with pt at bedside. Pt is alert and oriented throughout encounter. CM introduced self/role, verified demographics, and discussed discharge planning.    Patient is sleepy during the assessment able to answer some question.  She lives with her parents helps take care of them.  Does not work.  [facility] [address]     CM to follow up at a later time.    Kimberley GARG       08/03/24 9331   Service Assessment   Patient Orientation Alert and Oriented   Cognition Alert   History Provided By Patient   Primary Caregiver Self   Support Systems Parent   Patient's Healthcare Decision Maker is: Patient Declined (Legal Next of Kin Remains as Decision Maker)   PCP Verified by CM Yes   Last Visit to PCP Within last 3 months   Prior Functional Level Independent in ADLs/IADLs   Current Functional Level Independent in ADLs/IADLs   Can patient return to prior living arrangement Yes   Ability to make needs known: Good   Family able to assist with home care needs: Yes   Would you like for me to discuss the discharge plan with any other family members/significant others, and if so, who? No   Financial Resources Medicaid   CM/ Referral Difficulty coping/adjusting to illness;Disease Management Education   Social/Functional History   Lives With Parent   Type of Home House   Home Layout One level   ADL Assistance Independent   Homemaking Assistance Independent   Homemaking Responsibilities Yes   Ambulation Assistance Independent   Transfer Assistance Independent   Active  Yes   Education some college   Occupation Unemployed   Discharge Planning   Type of Residence House   Living Arrangements Parent   Potential Assistance Needed N/A   DME Ordered? No   Patient expects to be discharged to: House

## 2024-08-03 NOTE — PROGRESS NOTES
[facility]    [address] [facility] [address]    Phone:  [phone]       Thank you for choosing us for your health care needs. Please help us to ensure your records are accurate. Discuss any inaccuracies with the treating / prescribing physician or your Primary Care Provider.             Antonietta Azul   2017 10:00 AM   Office Visit   MRN: [id number]    Department:  [facility]   Dept Phone:  [phone]    Description:  Female : 1983   Provider:  Sravanthi Bañuelos MD           Your Information     Date Of Birth Race Ethnicity Preferred Language       1983 Black/ Not of  or  Origin English       Information your provider wants you to know    CMP every 3 months  To start vitamin E 100 IU BID  F/u 6 months       Transplant Information        Txp Date Organ Coordinator Care Team    2017 Kidney Jessica Frausto RN Nephrologist:  Vineet Pritchett MD   Referring Provider:  Austin Petit MD   Transplant Surgeon:  Ismael Doherty MD         Your To Do List     Future Appointments Provider Department Dept Phone Center    2017 11:00 AM SLM AT RN AB 1 [facility] [phone] [facility]    2017 11:50 AM Vineet Pritchett MD [facility] [phone] [facility]    Aug 16, 2017 10:30 AM KATIANA Ambriz [facility] [phone] [facility]    2017 11:30 AM Sravanthi Bañuelos MD [facility] [phone] [facility]    Standing Orders Interval    COMPREHENSIVE METABOLIC PANEL  every 3 months until May 16, 2018    Follow-Up    Return in about 6 months (around 2017).      Allergies     Engerix-b [Hepatitis B Virus Vaccine Recombinant] HIVES      Conditions Discussed Today or Order-Related Diagnoses        Comments    Elevated LFTs    -   [facility] Admission Pharmacy Medication Reconciliation    Information obtained from:  Patient interview, RxQuery  RxQuery data available1: yes    Comments/recommendations:    1) The patient is a good historian and was interviewed regarding current PTA medication list, use and drug allergies.     2) Medication changes to PTA list:    Added  OTC vitamin b12  OTC ibuprofen  Removed  Losartan 50 mg  Albuterol inhaler  NOTES  Patient is aware that metoprolol tartrate is prescribed as BID but admits to only taking once daily in morning.  Patient had not been taking potassium regularly but restarted 2 days ago because she was \"feeling weak.\"    3) The [place] Prescription Monitoring Program () was accessed to determine fill history of any controlled medications:  No record of controlled medications dispensed in past 2 years       1RxQuery pharmacy benefit data reflects medications filled and processed through the patient's insurance, however                this data does NOT capture whether the medication was picked up or is currently being taken by the patient.       Past Medical History/Disease States:  Past Medical History:   Diagnosis Date    Anemia 2/18/2012    Cocaine abuse affecting pregnancy ([facility])     Essential hypertension     Hypertension     SVT (supraventricular tachycardia) ([facility]) 11/16/2018         Patient allergies:   Allergies as of 08/03/2024 - Fully Reviewed 08/03/2024   Allergen Reaction Noted    Hydromorphone Itching 06/01/2011    Morphine Itching 02/14/2012    Hydralazine Rash 02/23/2012    Sulfa antibiotics Nausea And Vomiting 06/01/2011         Prior to Admission Medications   Prescriptions Last Dose Informant     amLODIPine (NORVASC) 10 MG tablet 8/2/2024 Self     Sig: Take 1 tablet by mouth every morning   buPROPion (WELLBUTRIN XL) 150 MG extended release tablet 8/2/2024 Self     Sig: Take 2 tablets Once a day   budesonide-formoterol (SYMBICORT) 160-4.5 MCG/ACT AERO 8/2/2024 Self     Sig: Inhale 2  Primary       Problem List as of 5/16/2017     Anemia, unspecified    Benign hypertension    Atelectasis    Delayed graft function of kidney transplant due to ATN requiring acute dialysis (CMS/HCC)    HTN (hypertension)    HSP (Henoch Schonlein purpura) (CMS/HCC)    CKD (chronic kidney disease) stage 5, GFR less than 15 ml/min (CMS/HCC)    Essential hypertension, benign    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    Hyperparathyroidism due to renal insufficiency (CMS/HCC)    Hypertensive urgency    Acute on chronic renal failure (CMS/[facility])    Anemia in CKD (chronic kidney disease)    ESRD on dialysis (CMS/[facility])    Pre-kidney transplant, patient on transplant list    Acute blood loss as cause of postoperative anemia superimposed on anemia of chronic renal disease    Kidney transplant (1/11/17 ddRT UV DBD [facility]) for Henoch-Schonlein purpura and IgA nephropathy    Immunosuppression (CMS/[facility])    Dehydration    Low magnesium levels    Wound healing, delayed      Your Vitals Were     BP Pulse Temp Height Weight SpO2    128/72 (BP Location: [facility], Patient Position: Sitting, Cuff Size: Regular) 69 98 °F (36.7 °C) (Oral) 5' 5\" (1.651 m) 213 lb 1.6 oz (96.7 kg) 97%    BMI Smoking Status                35.46 kg/m2 Never Smoker                Current Medications (as reported by you and your prescribing providers):        Disp Refills Start End    amLODIPine (NORVASC) 10 MG tablet 90 tablet 3 5/16/2017     Sig - Route: Take 1 tablet by mouth daily. - Oral    Class: Eprescribe    metoPROLOL (LOPRESSOR) 100 MG tablet 180 tablet 3 5/16/2017     Sig - Route: Take 1 tablet by mouth 2 times daily. Taking 100 mg BID - Oral    Class: Eprescribe    predniSONE (DELTASONE) 5 MG tablet 90 tablet 3 5/16/2017     Sig - Route: Take 1 tablet by mouth daily. - Oral    Class: Eprescribe    magnesium oxide (MAG-OX) 400 MG tablet 180 tablet 3 5/16/2017     Sig - Route: Take 4 tablets by mouth 2 times  daily. - Oral    Class: Script Not Printed    calcium carbonate (TUMS) 500 MG chewable tablet 60 tablet 11 5/4/2017     Sig - Route: Chew 2 tablets by mouth daily. - Oral    Class: OTC    tacrolimus (PROGRAF) 1 MG capsule 720 capsule 3 5/3/2017     Sig: Take 4 mg every am and 4 mg every pm    Class: Script Not Printed    Notes to Pharmacy: Diagnosis code Z94.0    ergocalciferol (DRISDOL) 90328 units capsule 8 capsule 0 5/1/2017     Sig - Route: Take 1 capsule by mouth once a week. For 8 weeks (end of June - Oral    Class: Eprescribe    cinacalcet (SENSIPAR) 60 MG tablet 90 tablet 3 4/26/2017     Sig - Route: Take 1 tablet by mouth daily. - Oral    Class: Eprescribe    pantoprazole (PROTONIX) 40 MG tablet 30 tablet 3 4/10/2017     Sig - Route: Take 1 tablet by mouth daily. - Oral    Class: Eprescribe    mycophenolate (CELLCEPT) 500 MG tablet 360 tablet 3 3/21/2017     Sig - Route: Take 2 tablets by mouth 2 times daily. - Oral    Class: Eprescribe    Notes to Pharmacy: Diagnosis code Z94.0    aspirin 81 MG chewable tablet 90 tablet 3 2/23/2017     Sig - Route: Chew 1 tablet by mouth daily. - Oral    Class: Eprescribe    cloNIDine (CATAPRES) 0.2 MG tablet 90 tablet 11 2/22/2017     Sig - Route: Take 1 tablet by mouth 3 times daily. Taking 0.2 mg TID - Oral    Class: Eprescribe    sulfamethoxazole-trimethoprim (BACTRIM SS) 400-80 MG per tablet 30 tablet 5 2/22/2017     Sig - Route: Take 1 tablet by mouth daily. - Oral    Class: Eprescribe    ondansetron (ZOFRAN ODT) 4 MG disintegrating tablet 20 tablet 0 2/10/2017     Sig - Route: Take 1 tablet by mouth every 8 hours as needed for Nausea. - Oral    vitamin E 100 units capsule 60 capsule 11 5/16/2017     Sig - Route: Take 1 capsule by mouth 2 times daily. - Oral    Class: Eprescribe    Omega-3 Fatty Acids (FISH OIL) 1000 MG capsule 60 capsule 11 5/16/2017     Sig - Route: Take 1 g by mouth 2 times daily. - Oral    Class: Eprescribe      Immunization History as of  5/16/2017     Name Date    Influenza 10/15/2008, 10/15/2008    Influenza A novel H1N1 12/15/2009, 12/15/2009            Maintenance Dialysis History     Start End Type Comments Center    4/18/2013  [facility]emo M/W/F [facility]            Current Dialysis Center Information     [facility] [address] Phone #:  [phone]    Contact:  [place]  [address] Fax #:  [phone]            Active Patient Thresholds     Patient has no thresholds defined.      Recent Review Flowsheet Data     Transplant Labs Latest Ref Rng & Units 3/27/2017 4/3/2017 4/10/2017 4/18/2017 4/25/2017 5/2/2017 5/11/2017    Tacrolimus Level 5.0 - 20.0 ng/mL 7.0 7.9 8.0 7.9 7.7 6.9 10.7    Hct 36.0 - 46.5 % 42.4 44.3 42.4 44.3 43.5 40.9 42.9    Hgb 12.0 - 15.5 g/dL 13.8 14.5 13.9 14.5 13.9 13.4 13.9    Platelets 140 - 450 K/mcL 245 344 294 321 206 228 260    WBC 4.2 - 11.0 K/mcL 11.1(H) 6.5 5.2 8.1 13.1(H) 7.6 8.5    RBC 4.00 - 5.20 mil/mcL 4.58 4.78 4.52 4.73 4.61 4.39 4.63    Lymph Abs 1.0 - 4.8 K/mcL - - - 3.3 2.5 1.7 -    Eos Abs 0.1 - 0.5 K/mcL - - - 0.1 0.4 0.1 -    Eos Rel % - - - 1 3 1 -    Monos Abs 0.3 - 0.9 K/mcL - - - 0.8 0.5 0.8 -    Monos Rel % - - - 10 4 11 -    Baso Abs 0.0 - 0.3 K/mcL - - - 0.0 0.0 0.1 -    Baso Rel % - - - 0 0 1 -    Bands Rel % - - - 1 1 - -    PTT 22 - 30 sec - - - - - - -    Na 135 - 145 mmol/L 137 138 141 138 141 141 140    K 3.4 - 5.1 mmol/L 4.0 4.6 4.4 4.4 3.8 4.3 4.3    Cl 98 - 107 mmol/L 101 103 106 102 104 104 104    CO2 21 - 32 mmol/L 25 24 25 24 23 27 25    Creat 0.51 - 0.95 mg/dL 1.09(H) 1.08(H) 1.04(H) 1.01(H) 1.12(H) 1.10(H) 1.08(H)    BUN 6 - 20 mg/dL 11 11 11 12 15 14 18    Glucose 65 - 99 mg/dL 132(H) 116(H) 121(H) 107(H) 123(H) 111(H) 112(H)    Albumin 3.6 - 5.1 g/dL - - - 3.7 3.5(L) - -    Alk Phos 45 - 117 Units/L - - - 165(H) 139(H) - -    ALT <79 Units/L - - - 90(H) 109(H) - -    AST <38 Units/L - - - 44(H) 41(H) - -    Total  Bili 0.2 - 1.0 mg/dL - - - 0.3 0.4 - -    Ca 8.4 - 10.2 mg/dL 9.2 9.7 9.4 8.2(L) 7.3(L) 7.8(L) 8.5    Mg 1.7 - 2.4 mg/dL 1.1(L) 1.2(L) 1.2(L) 1.2(L) 1.1(L) 1.3(L) 1.2(L)    PO4 2.4 - 4.7 mg/dL 2.3(L) 2.9 2.7 3.3 3.0 3.5 3.1    Hgb A1C 4.5 - 5.6 % - - - 5.3 - - -    Cholesterol <200 mg/dL - - 154 - - - -    HDL >59 mg/dL - - 35(L) - - - -    LDL Calc <130 mg/dL - - 86 - - - -    Triglyceride <150 mg/dL - - 167(H) - - - -    EBV DNA, PCR NOT DETECTED copies/mL - - - - - - -    BK Virus, Baldo PCR NOT DETECTED copies/mL - - - NOT DETECTED NOT DETECTED - -    Vit D, 25-Hydroxy 30.0 - 100.0 ng/mL - - - - 7.4(L) - -    Creatine 0.51 - 0.95 mg/dL 1.09(H) 1.08(H) 1.04(H) 1.01(H) 1.12(H) 1.10(H) 1.08(H)    Bacteria NONE SEEN /hpf - - - - - - -            Additional Information     We would appreciate your feedback! When you receive a Patient Satisfaction survey in the mail, we ask that you enter your feedback and return at your earliest convenience.    Thank you

## 2024-08-03 NOTE — ED NOTES
TRANSFER - OUT REPORT:    Verbal report given to CIPRIANO Yeh on Sofia Rea  being transferred to [address] [place] [address] for routine progression of patient care       Report consisted of patient's Situation, Background, Assessment and   Recommendations(SBAR).     Information from the following report(s) Nurse Handoff Report, Index, ED Encounter Summary, ED SBAR, Adult Overview, MAR, and Event Log was reviewed with the receiving nurse.    [place] Fall Assessment:    Presents to emergency department  because of falls (Syncope, seizure, or loss of consciousness): No  Age > 70: No  Altered Mental Status, Intoxication with alcohol or substance confusion (Disorientation, impaired judgment, poor safety awaremess, or inability to follow instructions): No  Impaired Mobility: Ambulates or transfers with assistive devices or assistance; Unable to ambulate or transer.: No  Nursing Judgement: No          Lines:   Peripheral IV 08/03/24 Left Antecubital (Active)   Site Assessment Clean, dry & intact 08/03/24 0342   Line Status Blood return noted;Brisk blood return;Flushed 08/03/24 0342   Line Care Cap changed 08/03/24 0342   Phlebitis Assessment No symptoms 08/03/24 0342   Infiltration Assessment 0 08/03/24 0342   Alcohol Cap Used Yes 08/03/24 0342   Dressing Status New dressing applied;Clean, dry & intact 08/03/24 0342   Dressing Type Transparent 08/03/24 0342   Dressing Intervention New 08/03/24 0342        Opportunity for questions and clarification was provided.      Patient transported with:  Monitor, O2 @ 4lpm, and Registered Nurse

## 2024-08-03 NOTE — H&P
after having used the following drugs: Cocaine. Frequency: 1.00 time per week.   Social Determinants of Health     Tobacco Use: High Risk (8/3/2024)    Patient History     Smoking Tobacco Use: Every Day     Smokeless Tobacco Use: Never     Passive Exposure: Past   Alcohol Use: Not At Risk (8/3/2024)    AUDIT-C     Frequency of Alcohol Consumption: Monthly or less     Average Number of Drinks: 1 or 2     Frequency of Binge Drinking: Less than monthly   Financial Resource Strain: Low Risk  (8/21/2023)    Overall Financial Resource Strain (CARDIA)     Difficulty of Paying Living Expenses: Not hard at all   Food Insecurity: Patient Declined (8/3/2024)    Hunger Vital Sign     Worried About Running Out of Food in the Last Year: Patient declined     Ran Out of Food in the Last Year: Patient declined   Transportation Needs: Patient Declined (8/3/2024)    PRAPARE - Transportation     Lack of Transportation (Medical): Patient declined     Lack of Transportation (Non-Medical): Patient declined   Physical Activity: Not on file   Stress: Not on file   Social Connections: Not on file   Intimate Partner Violence: Not on file   Depression: At risk (2/1/2024)    PHQ-2     PHQ-2 Score: 7   Housing Stability: Unknown (8/3/2024)    Housing Stability Vital Sign     Unable to Pay for Housing in the Last Year: Patient declined     Number of Places Lived in the Last Year: 1     Unstable Housing in the Last Year: No   Interpersonal Safety: Not At Risk (8/3/2024)    Interpersonal Safety Domain Source: IP Abuse Screening     Physical abuse: Denies     Verbal abuse: Denies     Emotional abuse: Denies     Financial abuse: Denies     Sexual abuse: Denies   Utilities: Not At Risk (8/3/2024)    [facility] Utilities     Threatened with loss of utilities: No        Medications were reconciled to the best of my ability given all available resources at the time of admission. Route is PO if not otherwise noted.     Family and social history were personally  peripheral lung opacities most prominent in the left lower   lobe suggesting atypical/viral infection. Trace left pleural effusion.         Electronically signed by Abe Edouard           ECG/ECHO:  [unfilled]       Notes reviewed from all clinical/nonclinical/nursing services involved in patient's clinical care. Care coordination discussions were held with appropriate clinical/nonclinical/ nursing providers based on care coordination needs.     Assessment and Plan :   Given the patient's current clinical presentation, there is a high level of concern for decompensation if discharged from the emergency department. Complex decision making was performed, which includes reviewing the patient's available past medical records, laboratory results, and imaging studies.    Principal Problem:    Pneumonia, unspecified organism  Resolved Problems:    * No resolved hospital problems. *      #Acute hypoxic respiratory failure,likely secondary to pneumonia  #Left lower lobe pneumonia with pleuritic chest pain  Admit to inpatient hospitalist service  Lactic acid:1.4  Influenza A/B/COVID: Negative  CTA chest:No acute pulmonary artery embolism. Several patchy peripheral lung opacities most prominent in the left lower lobe suggesting atypical/viral infection. Trace left pleural effusion.  -Check sputum culture, Legionella, mycoplasma  -IV fluids  -As needed bronchodilators  -IV antibiotics  -Continue oxygen support. Taper as tolerated.          #Substance use:  UDS: Positive for cocaine and amphetamine  Patient was counseled extensively on the need to abstain from drugs its addictive tendencies, its deleterious effects on the brain, cardiovascular system, lungs as well as its financial & social sequelae      Other medical issues  #Hypertension  #SVT  #COPD  #Depression/anxiety  #Nicotine use  #Substance use  #Noncompliance        DIET: ADULT DIET; Regular   ISOLATION PRECAUTIONS: No active isolations  CODE STATUS: [unfilled]

## 2024-08-03 NOTE — ED PROVIDER NOTES
[facility] EMERGENCY DEPT  EMERGENCY DEPARTMENT ENCOUNTER       Pt Name: Sofia Rea  MRN: [id number]  Birthdate 1970  Date of evaluation: 8/3/2024  Provider: Mayo Conte DO   PCP: Jose Maria Lopez MD  Note Started: 3:37 AM EDT 8/3/24     CHIEF COMPLAINT       Chief Complaint   Patient presents with    Rib Pain    Shortness of Breath        HISTORY OF PRESENT ILLNESS: 1 or more elements      History From: Patient, History limited by: none     Sofia Rea is a 54 y.o. female past medical history significant for hypertension, history of cocaine use reportedly not currently using presenting the emergency department complaining of chest pain, shortness of breath.  Pain is worse with a deep breath, worse with movement.  It is in the bilateral chest.  Patient denies any recent cocaine use.  No fever.       Please See MDM for Additional Details of the HPI/PMH  Nursing Notes were all reviewed and agreed with or any disagreements were addressed in the HPI.     REVIEW OF SYSTEMS        Positives and Pertinent negatives as per HPI.    PAST HISTORY     Past Medical History:  Past Medical History:   Diagnosis Date    Anemia 2012    Cocaine abuse affecting pregnancy (HCC)     Essential hypertension     Hypertension     SVT (supraventricular tachycardia) (HCC) 2018       Past Surgical History:  Past Surgical History:   Procedure Laterality Date     SECTION      CHOLECYSTECTOMY      PELVIC LAPAROSCOPY      ovarian cystectomy    TONSILLECTOMY         Family History:  Family History   Problem Relation Age of Onset    Hypertension Father     Alcohol Abuse Father     Breast Cancer Sister 45    No Known Problems Daughter     No Known Problems Son     No Known Problems Son     Cancer Brother         Throat versus Thyroid       Social History:  Social History     Tobacco Use    Smoking status: Every Day     Current packs/day: 1.00     Average packs/day: 1 pack/day for 30.0 years (30.0 ttl pk-yrs)     Types:

## 2024-08-03 NOTE — PROGRESS NOTES
1624: Dr. Gill notified patient's HR sustaining in the 120's.  Vital signs taken and stable patient was assessed and asymptomatic.      1625 Order was placed to give one time dose of metoprolol 12.5mg PO.    1629 Patient given Metoprolol 12.5mg PO.    1710 no improvement to patient's heart rate.  MD notified.    New orders given to give diltizem 5mg IV push.      1724 Vital signs taken and diltizem 5mg IV given.  Patient's BP cycled and patient remained on monitor. Patient tolerated medication well.      1809 last BP and pulse check patient is back to sinus rhythm with a heart rate in the 80's.    /81   Pulse 88   Temp 97.8 °F (36.6 °C) (Oral)   Resp 21   Ht 1.702 m (5' 7\")   Wt 86.8 kg (191 lb 4.8 oz)   SpO2 93%   BMI 29.96 kg/m²

## 2024-08-03 NOTE — PROGRESS NOTES
Patient received from ED at 0650.   Admission procedure done with on upcoming day  nurse CIPRIANO paz. Patient comfortably sitting in bed. Actively participate during admission and hand off.

## 2024-08-03 NOTE — ED TRIAGE NOTES
Pt presents via EMS to ED for right sided ribcage pain that radiates to right side of chest that started around 2300 yesterday. Pt reports pain worse when breathing in & with movement. Pt also reports some SOB, has hx of COPD.

## 2024-08-03 NOTE — ED NOTES
See triage note. Pt is alert and oriented x 4, RR even and unlabored, skin is warm and dry. Assesment completed and pt updated on plan of care.       Emergency Department Nursing Plan of Care       The Nursing Plan of Care is developed from the Nursing assessment and Emergency Department Attending provider initial evaluation.  The plan of care may be reviewed in the “ED Provider note”.    The Plan of Care was developed with the following considerations:   Patient / Family readiness to learn indicated by:verbalized understanding  Persons(s) to be included in education: patient  Barriers to Learning/Limitations:None    Signed     Emma Urrutia RN    8/3/2024   4:17 AM

## 2024-08-04 LAB
ANION GAP SERPL CALC-SCNC: 7 MMOL/L (ref 5–15)
BASOPHILS # BLD: 0 K/UL (ref 0–0.1)
BASOPHILS NFR BLD: 0 % (ref 0–1)
BUN SERPL-MCNC: 27 MG/DL (ref 6–20)
BUN/CREAT SERPL: 20 (ref 12–20)
CALCIUM SERPL-MCNC: 8.4 MG/DL (ref 8.5–10.1)
CHLORIDE SERPL-SCNC: 102 MMOL/L (ref 97–108)
CO2 SERPL-SCNC: 28 MMOL/L (ref 21–32)
CREAT SERPL-MCNC: 1.35 MG/DL (ref 0.55–1.02)
DIFFERENTIAL METHOD BLD: ABNORMAL
EOSINOPHIL # BLD: 0 K/UL (ref 0–0.4)
EOSINOPHIL NFR BLD: 0 % (ref 0–7)
ERYTHROCYTE [DISTWIDTH] IN BLOOD BY AUTOMATED COUNT: 12.2 % (ref 11.5–14.5)
GLUCOSE SERPL-MCNC: 329 MG/DL (ref 65–100)
HCT VFR BLD AUTO: 37.5 % (ref 35–47)
HGB BLD-MCNC: 12.9 G/DL (ref 11.5–16)
IMM GRANULOCYTES # BLD AUTO: 0.1 K/UL (ref 0–0.04)
IMM GRANULOCYTES NFR BLD AUTO: 1 % (ref 0–0.5)
LYMPHOCYTES # BLD: 2.2 K/UL (ref 0.8–3.5)
LYMPHOCYTES NFR BLD: 16 % (ref 12–49)
MCH RBC QN AUTO: 33.2 PG (ref 26–34)
MCHC RBC AUTO-ENTMCNC: 34.4 G/DL (ref 30–36.5)
MCV RBC AUTO: 96.6 FL (ref 80–99)
MONOCYTES # BLD: 0.9 K/UL (ref 0–1)
MONOCYTES NFR BLD: 6 % (ref 5–13)
NEUTS SEG # BLD: 11.2 K/UL (ref 1.8–8)
NEUTS SEG NFR BLD: 77 % (ref 32–75)
NRBC # BLD: 0 K/UL (ref 0–0.01)
NRBC BLD-RTO: 0 PER 100 WBC
PLATELET # BLD AUTO: 308 K/UL (ref 150–400)
PMV BLD AUTO: 10.5 FL (ref 8.9–12.9)
POTASSIUM SERPL-SCNC: 3.7 MMOL/L (ref 3.5–5.1)
RBC # BLD AUTO: 3.88 M/UL (ref 3.8–5.2)
SODIUM SERPL-SCNC: 137 MMOL/L (ref 136–145)
WBC # BLD AUTO: 14.4 K/UL (ref 3.6–11)

## 2024-08-04 PROCEDURE — 94760 N-INVAS EAR/PLS OXIMETRY 1: CPT

## 2024-08-04 PROCEDURE — 6370000000 HC RX 637 (ALT 250 FOR IP): Performed by: INTERNAL MEDICINE

## 2024-08-04 PROCEDURE — 2580000003 HC RX 258: Performed by: HOSPITALIST

## 2024-08-04 PROCEDURE — 94640 AIRWAY INHALATION TREATMENT: CPT

## 2024-08-04 PROCEDURE — 6370000000 HC RX 637 (ALT 250 FOR IP): Performed by: HOSPITALIST

## 2024-08-04 PROCEDURE — 80048 BASIC METABOLIC PNL TOTAL CA: CPT

## 2024-08-04 PROCEDURE — 85025 COMPLETE CBC W/AUTO DIFF WBC: CPT

## 2024-08-04 PROCEDURE — 6360000002 HC RX W HCPCS: Performed by: HOSPITALIST

## 2024-08-04 PROCEDURE — 36415 COLL VENOUS BLD VENIPUNCTURE: CPT

## 2024-08-04 PROCEDURE — 87070 CULTURE OTHR SPECIMN AEROBIC: CPT

## 2024-08-04 PROCEDURE — 1200000000 HC SEMI PRIVATE

## 2024-08-04 PROCEDURE — 87205 SMEAR GRAM STAIN: CPT

## 2024-08-04 RX ORDER — TRAMADOL HYDROCHLORIDE 50 MG/1
25 TABLET ORAL ONCE
Status: COMPLETED | OUTPATIENT
Start: 2024-08-04 | End: 2024-08-04

## 2024-08-04 RX ORDER — AMLODIPINE BESYLATE 5 MG/1
10 TABLET ORAL DAILY
Status: DISCONTINUED | OUTPATIENT
Start: 2024-08-04 | End: 2024-08-05 | Stop reason: HOSPADM

## 2024-08-04 RX ORDER — AZITHROMYCIN 500 MG/1
500 TABLET, FILM COATED ORAL DAILY
Status: DISCONTINUED | OUTPATIENT
Start: 2024-08-05 | End: 2024-08-05 | Stop reason: HOSPADM

## 2024-08-04 RX ORDER — ACETAMINOPHEN 325 MG/1
650 TABLET ORAL EVERY 6 HOURS PRN
Status: DISCONTINUED | OUTPATIENT
Start: 2024-08-04 | End: 2024-08-05 | Stop reason: HOSPADM

## 2024-08-04 RX ORDER — TRAZODONE HYDROCHLORIDE 50 MG/1
50 TABLET ORAL NIGHTLY
Status: DISCONTINUED | OUTPATIENT
Start: 2024-08-04 | End: 2024-08-05 | Stop reason: HOSPADM

## 2024-08-04 RX ADMIN — ARFORMOTEROL TARTRATE: 15 SOLUTION RESPIRATORY (INHALATION) at 07:12

## 2024-08-04 RX ADMIN — BUPROPION HYDROCHLORIDE 150 MG: 150 TABLET, EXTENDED RELEASE ORAL at 10:00

## 2024-08-04 RX ADMIN — SODIUM CHLORIDE, PRESERVATIVE FREE 10 ML: 5 INJECTION INTRAVENOUS at 09:00

## 2024-08-04 RX ADMIN — METOPROLOL TARTRATE 12.5 MG: 25 TABLET, FILM COATED ORAL at 09:59

## 2024-08-04 RX ADMIN — ACETAMINOPHEN 650 MG: 325 TABLET ORAL at 12:34

## 2024-08-04 RX ADMIN — CEFTRIAXONE SODIUM 1000 MG: 1 INJECTION, POWDER, FOR SOLUTION INTRAMUSCULAR; INTRAVENOUS at 03:56

## 2024-08-04 RX ADMIN — SODIUM CHLORIDE: 9 INJECTION, SOLUTION INTRAVENOUS at 04:55

## 2024-08-04 RX ADMIN — AZITHROMYCIN MONOHYDRATE 500 MG: 500 INJECTION, POWDER, LYOPHILIZED, FOR SOLUTION INTRAVENOUS at 04:55

## 2024-08-04 RX ADMIN — SODIUM CHLORIDE, PRESERVATIVE FREE 10 ML: 5 INJECTION INTRAVENOUS at 21:25

## 2024-08-04 RX ADMIN — ENOXAPARIN SODIUM 40 MG: 100 INJECTION SUBCUTANEOUS at 10:00

## 2024-08-04 RX ADMIN — AMLODIPINE BESYLATE 10 MG: 5 TABLET ORAL at 22:43

## 2024-08-04 RX ADMIN — ARFORMOTEROL TARTRATE: 15 SOLUTION RESPIRATORY (INHALATION) at 20:25

## 2024-08-04 RX ADMIN — METOPROLOL TARTRATE 12.5 MG: 25 TABLET, FILM COATED ORAL at 21:23

## 2024-08-04 RX ADMIN — SODIUM CHLORIDE: 9 INJECTION, SOLUTION INTRAVENOUS at 03:54

## 2024-08-04 RX ADMIN — METFORMIN HYDROCHLORIDE 500 MG: 500 TABLET ORAL at 17:19

## 2024-08-04 RX ADMIN — TRAMADOL HYDROCHLORIDE 25 MG: 50 TABLET ORAL at 18:10

## 2024-08-04 RX ADMIN — METFORMIN HYDROCHLORIDE 500 MG: 500 TABLET ORAL at 11:31

## 2024-08-04 RX ADMIN — TRAZODONE HYDROCHLORIDE 50 MG: 50 TABLET ORAL at 22:29

## 2024-08-04 ASSESSMENT — PAIN DESCRIPTION - LOCATION
LOCATION: ABDOMEN

## 2024-08-04 ASSESSMENT — PAIN DESCRIPTION - ORIENTATION
ORIENTATION: RIGHT;LEFT
ORIENTATION: RIGHT
ORIENTATION: RIGHT;LEFT

## 2024-08-04 ASSESSMENT — PAIN SCALES - GENERAL
PAINLEVEL_OUTOF10: 3
PAINLEVEL_OUTOF10: 0
PAINLEVEL_OUTOF10: 3
PAINLEVEL_OUTOF10: 5
PAINLEVEL_OUTOF10: 5
PAINLEVEL_OUTOF10: 0

## 2024-08-04 ASSESSMENT — PAIN DESCRIPTION - PAIN TYPE: TYPE: ACUTE PAIN

## 2024-08-04 ASSESSMENT — PAIN DESCRIPTION - DESCRIPTORS
DESCRIPTORS: STABBING;SHARP
DESCRIPTORS: STABBING
DESCRIPTORS: ACHING

## 2024-08-04 ASSESSMENT — PAIN - FUNCTIONAL ASSESSMENT: PAIN_FUNCTIONAL_ASSESSMENT: ACTIVITIES ARE NOT PREVENTED

## 2024-08-04 NOTE — DISCHARGE INSTRUCTIONS
It is important that you take the medication exactly as they are prescribed.   Keep your medication in the bottles provided by the pharmacist and keep a list of the medication names, dosages, and times to be taken in your wallet.   Do not take other medications without consulting your doctor.       NOTIFY YOUR PHYSICIAN OR GO TO THE NEAREST EMERGENCY ROOM FOR ANY OF THE FOLLOWING:   Fever over 101 degrees for 24 hours.   Chest pain, shortness of breath, fever, chills, nausea, vomiting, diarrhea, change in mentation, falling, weakness, bleeding. Severe pain or pain not relieved by medications.  Or, any other signs or symptoms that you may have questions about.      Note: You were admitted to hospital with pneumonia.  You were found to have elevated blood sugar test (HbA1c: 6.5).  You need to monitor blood sugar, noted down and take it to your primary care physician for further evaluation/management.  Follow-up with your primary care physician for continued healthcare management/screenings.  Few tests (sputum culture, test for Legionella and mycoplasma) results are pending.  Please call over facility [facility] phone #[phone] to obtain final result and then discuss with primary care physician.

## 2024-08-04 NOTE — PROGRESS NOTES
[facility]                                                                               Hospitalist Progress Note  Sean Gill MD          Date of Service:  2024  NAME:  Sofia Rea  :  1970  MRN:  [id number]    Please note that this dictation was completed with [employer], the computer voice recognition software.  Quite often unanticipated grammatical, syntax, homophones, and other interpretive errors are inadvertently transcribed by the computer software.  Please disregard these errors.  Please excuse any errors that have escaped final proofreading.    Admission Summary:   Sofia Rea is a 54 y.o. female with past medical history of HTN, SVT, depression/anxiety, anemia, COPD, chronic smoker, substance use (cocaine) who presented to emergency room with shortness of breath and bilateral chest pain associated with deep breath and movement.  Symptoms started about 1 week ago with initially productive cough with grayish phlegm and generalized weakness/fatigue.    In ED, Patient had leukocytosis.  CTA chest was negative for PE however showed several patchy peripheral lung opacities most prominent in the left lower lobe suggesting infection.  There was trace left pleural effusion.  Decision was taken to admit patient with a diagnosis of pneumonia.       Interval history / Subjective:   Patient did not have any acute event overnight.  Overall feeling better.      Assessment & Plan:     Anticipated discharge date : 2024  Anticipated disposition : Home  Barriers to discharge :      #Acute hypoxic respiratory failure,likely secondary to pneumonia(resolved)  #Left lower lobe pneumonia with pleuritic chest pain  #Mildly elevated creatinine  Admit to inpatient hospitalist service  Lactic acid:1.4  Procalcitonin:<0.05  Influenza A/B/COVID: Negative  CTA chest:No acute pulmonary artery embolism. Several patchy peripheral lung opacities most prominent in the  mL  5-40 mL IntraVENous 2 times per day    sodium chloride flush 0.9 % injection 5-40 mL  5-40 mL IntraVENous PRN    0.9 % sodium chloride infusion   IntraVENous PRN    potassium chloride (KLOR-CON) extended release tablet 40 mEq  40 mEq Oral PRN    Or    potassium bicarb-citric acid (EFFER-K) effervescent tablet 40 mEq  40 mEq Oral PRN    Or    potassium chloride 10 mEq/100 mL IVPB (Peripheral Line)  10 mEq IntraVENous PRN    magnesium sulfate 2000 mg in 50 mL IVPB premix  2,000 mg IntraVENous PRN    enoxaparin (LOVENOX) injection 40 mg  40 mg SubCUTAneous Daily    ondansetron (ZOFRAN-ODT) disintegrating tablet 4 mg  4 mg Oral Q8H PRN    Or    ondansetron (ZOFRAN) injection 4 mg  4 mg IntraVENous Q6H PRN    polyethylene glycol (GLYCOLAX) packet 17 g  17 g Oral Daily PRN    acetaminophen (TYLENOL) tablet 650 mg  650 mg Oral Q6H PRN    Or    acetaminophen (TYLENOL) suppository 650 mg  650 mg Rectal Q6H PRN    melatonin tablet 6 mg  6 mg Oral Nightly PRN    aluminum & magnesium hydroxide-simethicone (MAALOX) 200-200-20 MG/5ML suspension 30 mL  30 mL Oral Q6H PRN    arformoterol 15 mcg-budesonide 0.5 mg neb solution   Nebulization BID RT    ipratropium 0.5 mg-albuterol 2.5 mg (DUONEB) nebulizer solution 1 Dose  1 Dose Inhalation Q4H PRN    cefTRIAXone (ROCEPHIN) 1,000 mg in sodium chloride 0.9 % 50 mL IVPB (mini-bag)  1,000 mg IntraVENous Q24H    azithromycin (ZITHROMAX) 500 mg in sodium chloride 0.9% 250 mL (vial-mate/adapter) IVPB  500 mg IntraVENous Q24H    guaiFENesin-dextromethorphan (ROBITUSSIN DM) 100-10 MG/5ML syrup 5 mL  5 mL Oral Q4H PRN    lactated ringers IV soln infusion   IntraVENous Once    buPROPion (WELLBUTRIN XL) extended release tablet 150 mg  150 mg Oral Daily    metoprolol tartrate (LOPRESSOR) tablet 12.5 mg  12.5 mg Oral BID     ______________________________________________________________________  EXPECTED LENGTH OF STAY: 2  ACTUAL LENGTH OF STAY:          1                 Sean Gill MD

## 2024-08-04 NOTE — PROGRESS NOTES
0400- Morning labs draw and send to labs.   Patient sleep well at night.   No any overnight issue.

## 2024-08-04 NOTE — PROGRESS NOTES
0400- Morning labs draw and send to labs.   Patient sleep well at night.  No any overnight issue.

## 2024-08-05 VITALS
OXYGEN SATURATION: 95 % | DIASTOLIC BLOOD PRESSURE: 95 MMHG | HEART RATE: 68 BPM | TEMPERATURE: 98.1 F | WEIGHT: 191.3 LBS | HEIGHT: 67 IN | SYSTOLIC BLOOD PRESSURE: 148 MMHG | RESPIRATION RATE: 18 BRPM | BODY MASS INDEX: 30.02 KG/M2

## 2024-08-05 LAB
BASOPHILS # BLD: 0.1 K/UL (ref 0–0.1)
BASOPHILS NFR BLD: 1 % (ref 0–1)
DIFFERENTIAL METHOD BLD: ABNORMAL
EOSINOPHIL # BLD: 0.1 K/UL (ref 0–0.4)
EOSINOPHIL NFR BLD: 1 % (ref 0–7)
ERYTHROCYTE [DISTWIDTH] IN BLOOD BY AUTOMATED COUNT: 12.2 % (ref 11.5–14.5)
HCT VFR BLD AUTO: 37 % (ref 35–47)
HGB BLD-MCNC: 12.6 G/DL (ref 11.5–16)
IMM GRANULOCYTES # BLD AUTO: 0.1 K/UL (ref 0–0.04)
IMM GRANULOCYTES NFR BLD AUTO: 1 % (ref 0–0.5)
LYMPHOCYTES # BLD: 3.1 K/UL (ref 0.8–3.5)
LYMPHOCYTES NFR BLD: 29 % (ref 12–49)
M PNEUMO IGG SER IA-ACNC: 1504 U/ML (ref 0–99)
M PNEUMO IGM SER IA-ACNC: <770 U/ML (ref 0–769)
MCH RBC QN AUTO: 33.2 PG (ref 26–34)
MCHC RBC AUTO-ENTMCNC: 34.1 G/DL (ref 30–36.5)
MCV RBC AUTO: 97.4 FL (ref 80–99)
MONOCYTES # BLD: 0.6 K/UL (ref 0–1)
MONOCYTES NFR BLD: 5 % (ref 5–13)
NEUTS SEG # BLD: 6.8 K/UL (ref 1.8–8)
NEUTS SEG NFR BLD: 63 % (ref 32–75)
NRBC # BLD: 0 K/UL (ref 0–0.01)
NRBC BLD-RTO: 0 PER 100 WBC
PLATELET # BLD AUTO: 297 K/UL (ref 150–400)
PMV BLD AUTO: 10.4 FL (ref 8.9–12.9)
RBC # BLD AUTO: 3.8 M/UL (ref 3.8–5.2)
WBC # BLD AUTO: 10.6 K/UL (ref 3.6–11)

## 2024-08-05 PROCEDURE — 36415 COLL VENOUS BLD VENIPUNCTURE: CPT

## 2024-08-05 PROCEDURE — 6370000000 HC RX 637 (ALT 250 FOR IP): Performed by: INTERNAL MEDICINE

## 2024-08-05 PROCEDURE — 6360000002 HC RX W HCPCS: Performed by: HOSPITALIST

## 2024-08-05 PROCEDURE — 85025 COMPLETE CBC W/AUTO DIFF WBC: CPT

## 2024-08-05 PROCEDURE — 94640 AIRWAY INHALATION TREATMENT: CPT

## 2024-08-05 PROCEDURE — 2580000003 HC RX 258: Performed by: HOSPITALIST

## 2024-08-05 PROCEDURE — 6370000000 HC RX 637 (ALT 250 FOR IP): Performed by: HOSPITALIST

## 2024-08-05 RX ORDER — GLUCOSAMINE HCL/CHONDROITIN SU 500-400 MG
CAPSULE ORAL
Qty: 100 STRIP | Refills: 0 | Status: SHIPPED | OUTPATIENT
Start: 2024-08-05

## 2024-08-05 RX ORDER — AMOXICILLIN AND CLAVULANATE POTASSIUM 875; 125 MG/1; MG/1
1 TABLET, FILM COATED ORAL 2 TIMES DAILY
Qty: 8 TABLET | Refills: 0 | Status: SHIPPED | OUTPATIENT
Start: 2024-08-05 | End: 2024-08-09

## 2024-08-05 RX ORDER — BLOOD-GLUCOSE METER
1 KIT MISCELLANEOUS DAILY
Qty: 1 KIT | Refills: 0 | Status: SHIPPED | OUTPATIENT
Start: 2024-08-05

## 2024-08-05 RX ORDER — ALBUTEROL SULFATE 90 UG/1
2 AEROSOL, METERED RESPIRATORY (INHALATION) 4 TIMES DAILY PRN
Qty: 18 G | Refills: 0 | Status: SHIPPED | OUTPATIENT
Start: 2024-08-05

## 2024-08-05 RX ORDER — LANCETS 30 GAUGE
1 EACH MISCELLANEOUS DAILY
Qty: 100 EACH | Refills: 0 | Status: SHIPPED | OUTPATIENT
Start: 2024-08-05

## 2024-08-05 RX ORDER — GUAIFENESIN/DEXTROMETHORPHAN 100-10MG/5
5 SYRUP ORAL EVERY 4 HOURS PRN
Qty: 120 ML | Refills: 0 | Status: SHIPPED | OUTPATIENT
Start: 2024-08-05 | End: 2024-08-15

## 2024-08-05 RX ADMIN — METFORMIN HYDROCHLORIDE 500 MG: 500 TABLET ORAL at 09:20

## 2024-08-05 RX ADMIN — ARFORMOTEROL TARTRATE: 15 SOLUTION RESPIRATORY (INHALATION) at 08:09

## 2024-08-05 RX ADMIN — AZITHROMYCIN 500 MG: 500 TABLET, FILM COATED ORAL at 09:20

## 2024-08-05 RX ADMIN — CEFTRIAXONE SODIUM 1000 MG: 1 INJECTION, POWDER, FOR SOLUTION INTRAMUSCULAR; INTRAVENOUS at 04:29

## 2024-08-05 RX ADMIN — ENOXAPARIN SODIUM 40 MG: 100 INJECTION SUBCUTANEOUS at 09:22

## 2024-08-05 RX ADMIN — BUPROPION HYDROCHLORIDE 150 MG: 150 TABLET, EXTENDED RELEASE ORAL at 09:20

## 2024-08-05 RX ADMIN — METOPROLOL TARTRATE 12.5 MG: 25 TABLET, FILM COATED ORAL at 09:21

## 2024-08-05 RX ADMIN — AMLODIPINE BESYLATE 10 MG: 5 TABLET ORAL at 09:20

## 2024-08-05 NOTE — PROGRESS NOTES
0414  Patient recheck B/P at this time =156/100, P=68 MAP= 119 R=18 SPO2= 94. T=97.7.  What do you recommend at this time?

## 2024-08-05 NOTE — PROGRESS NOTES
Patient’s case reviewed during interdisciplinary team meeting in Med Surg/Tele Unit 2.  Rev. Hailey Collazo MDiv, [facility]

## 2024-08-05 NOTE — PROGRESS NOTES
0715 -- Bedside and Verbal shift change report given to NUBIA Almanzar (oncoming nurse) by CIPRIANO Harris (offgoing nurse). Report included the following information Nurse Handoff Report and Adult Overview.     1000 -- Interdisciplinary team rounds were held 8/5/2024 with the following team members:Pharmacy, Physical Therapy, Physician, and Clinical Coordinator. Plan to discharge the pt today.     1220 -- Discharge orders in, IV removed, catheter intact, no complications. Reviewed discharge paperwork with pt in its entirety to include meds sent to pharmacy and upcoming appointments. She voiced understanding, no further questions.     1230 -- Assisted pt in packing up her belongings and walked pt downstairs, her mother will pick her up at the ED entrance. Pt had no further questions.

## 2024-08-05 NOTE — PROGRESS NOTES
Pharmacist Discharge Medication Reconciliation    Discharging Provider: Din      Discharge Medications:   Current Discharge Medication List        START taking these medications    Details   metFORMIN (GLUCOPHAGE) 500 MG tablet Take 1 tablet by mouth 2 times daily (with meals)  Qty: 60 tablet, Refills: 0      guaiFENesin-dextromethorphan (ROBITUSSIN DM) 100-10 MG/5ML syrup Take 5 mLs by mouth every 4 hours as needed for Cough  Qty: 120 mL, Refills: 0      amoxicillin-clavulanate (AUGMENTIN) 875-125 MG per tablet Take 1 tablet by mouth 2 times daily for 4 days  Qty: 8 tablet, Refills: 0      glucose monitoring kit 1 kit by Does not apply route daily  Qty: 1 kit, Refills: 0      blood glucose monitor strips Test three times a day before meals & as needed for symptoms of irregular blood glucose. Dispense sufficient amount for indicated testing frequency plus additional to accommodate PRN testing needs.  Qty: 100 strip, Refills: 0    Comments: (1) Identify specific brand and product.  (2) Include specific quantity.  (3) Include frequency.      Lancets MISC 1 each by Does not apply route daily  Qty: 100 each, Refills: 0      albuterol sulfate HFA (VENTOLIN HFA) 108 (90 Base) MCG/ACT inhaler Inhale 2 puffs into the lungs 4 times daily as needed for Wheezing  Qty: 18 g, Refills: 0           CONTINUE these medications which have NOT CHANGED    Details   vitamin B-12 (CYANOCOBALAMIN) 100 MCG tablet Take 1 tablet by mouth daily      hydrOXYzine HCl (ATARAX) 25 MG tablet Take 1 tablet by mouth every 8 hours as needed for Anxiety  Qty: 90 tablet, Refills: 0    Associated Diagnoses: SHAQUILLE (generalized anxiety disorder)      metoprolol tartrate (LOPRESSOR) 25 MG tablet Take 1 tablet by mouth 2 times daily  Qty: 180 tablet, Refills: 1    Associated Diagnoses: SVT (supraventricular tachycardia) (HCC)      amLODIPine (NORVASC) 10 MG tablet Take 1 tablet by mouth every morning  Qty: 90 tablet, Refills: 1    Associated Diagnoses:

## 2024-08-05 NOTE — DISCHARGE SUMMARY
size criteria. There are several patchy peripheral lung opacities most prominent in the left lower lobe. There is a trace left pleural effusion. No right pleural effusion or pneumothorax. Central airways are unremarkable. Limited images of the upper abdomen are within normal limits. The bony structures are age-appropriate.     1. No acute pulmonary artery embolism. 2. Several patchy peripheral lung opacities most prominent in the left lower lobe suggesting atypical/viral infection. Trace left pleural effusion. Electronically signed by Abe Edouard         Greater than 31 minutes were spent with the patient on counseling and coordination of care    Signed:   Sean Gill MD  8/5/2024  7:21 AM

## 2024-08-05 NOTE — CARE COORDINATION
Transition of Care Plan:    RUR: 10%  Prior Level of Functioning: IND  Disposition: HOME  If SNF or IPR: Date FOC offered: N/A  Date FOC received:   Accepting facility:   Date authorization started with reference number:   Date authorization received and expires:   Follow up appointments: ON AVS  DME needed: N/A  Transportation at discharge: FAMILY  IM/IMM Medicare/ letter given: N/A  Is patient a [place] and connected with VA? N/A  If yes, was  transfer form completed and VA notified?   Caregiver Contact: N/A  Discharge Caregiver contacted prior to discharge? N/A  Care Conference needed? N/A  Barriers to discharge: N/A    Provider or Organization  Follow-up  PCP  Care Team  This Department  Add Me  Unlisted  External Appointment                      Name Relationship Specialty Phone Fax Address Order                Jose Maria Lopez MD PCP - [facility] Provider Family [facility] [phone] [address] [place] [address]     Next Steps: Follow up on 8/30/2024  Instructions: PLEASE ATTEND YOUR APPOINTMENT WITH YOUR PROVIDER ON AUGUST 30TH AT 2 PM.            AWAIS ARITA RNCM      ADDENDUM      CM FAXED OVER DIABETES EDUCATION FORM    AWAIS ARITA RN CM

## 2024-08-05 NOTE — PROGRESS NOTES
[facility]                                                                               Hospitalist Progress Note  Sean Gill MD          Date of Service:  2024  NAME:  Sofia Rea  :  1970  MRN:  [id number]    Please note that this dictation was completed with [employer], the computer voice recognition software.  Quite often unanticipated grammatical, syntax, homophones, and other interpretive errors are inadvertently transcribed by the computer software.  Please disregard these errors.  Please excuse any errors that have escaped final proofreading.    Admission Summary:   Sofia Rea is a 54 y.o. female with past medical history of HTN, SVT, depression/anxiety, anemia, COPD, chronic smoker, substance use (cocaine) who presented to emergency room with shortness of breath and bilateral chest pain associated with deep breath and movement.  Symptoms started about 1 week ago with initially productive cough with grayish phlegm and generalized weakness/fatigue.    In ED, Patient had leukocytosis.  CTA chest was negative for PE however showed several patchy peripheral lung opacities most prominent in the left lower lobe suggesting infection.  There was trace left pleural effusion.  Decision was taken to admit patient with a diagnosis of pneumonia.       Interval history / Subjective:   Patient feels much improved.  Wants to go home.  Ambulating independently.     Assessment & Plan:     Anticipated discharge date : 2024  Anticipated disposition : Home  Barriers to discharge :      #Acute hypoxic respiratory failure,likely secondary to pneumonia(resolved)  #Left lower lobe pneumonia with pleuritic chest pain  #Mildly elevated creatinine  Lactic acid:1.4  Procalcitonin:<0.05  Influenza A/B/COVID: Negative  CTA chest:No acute pulmonary artery embolism. Several patchy peripheral lung opacities most prominent in the left lower lobe suggesting     metFORMIN (GLUCOPHAGE) tablet 500 mg  500 mg Oral BID WC    acetaminophen (TYLENOL) tablet 650 mg  650 mg Oral Q6H PRN    azithromycin (ZITHROMAX) tablet 500 mg  500 mg Oral Daily    traZODone (DESYREL) tablet 50 mg  50 mg Oral Nightly    amLODIPine (NORVASC) tablet 10 mg  10 mg Oral Daily    sodium chloride flush 0.9 % injection 5-40 mL  5-40 mL IntraVENous 2 times per day    sodium chloride flush 0.9 % injection 5-40 mL  5-40 mL IntraVENous PRN    0.9 % sodium chloride infusion   IntraVENous PRN    potassium chloride (KLOR-CON) extended release tablet 40 mEq  40 mEq Oral PRN    Or    potassium bicarb-citric acid (EFFER-K) effervescent tablet 40 mEq  40 mEq Oral PRN    Or    potassium chloride 10 mEq/100 mL IVPB (Peripheral Line)  10 mEq IntraVENous PRN    magnesium sulfate 2000 mg in 50 mL IVPB premix  2,000 mg IntraVENous PRN    enoxaparin (LOVENOX) injection 40 mg  40 mg SubCUTAneous Daily    ondansetron (ZOFRAN-ODT) disintegrating tablet 4 mg  4 mg Oral Q8H PRN    Or    ondansetron (ZOFRAN) injection 4 mg  4 mg IntraVENous Q6H PRN    polyethylene glycol (GLYCOLAX) packet 17 g  17 g Oral Daily PRN    melatonin tablet 6 mg  6 mg Oral Nightly PRN    aluminum & magnesium hydroxide-simethicone (MAALOX) 200-200-20 MG/5ML suspension 30 mL  30 mL Oral Q6H PRN    arformoterol 15 mcg-budesonide 0.5 mg neb solution   Nebulization BID RT    ipratropium 0.5 mg-albuterol 2.5 mg (DUONEB) nebulizer solution 1 Dose  1 Dose Inhalation Q4H PRN    cefTRIAXone (ROCEPHIN) 1,000 mg in sodium chloride 0.9 % 50 mL IVPB (mini-bag)  1,000 mg IntraVENous Q24H    guaiFENesin-dextromethorphan (ROBITUSSIN DM) 100-10 MG/5ML syrup 5 mL  5 mL Oral Q4H PRN    lactated ringers IV soln infusion   IntraVENous Once    buPROPion (WELLBUTRIN XL) extended release tablet 150 mg  150 mg Oral Daily    metoprolol tartrate (LOPRESSOR) tablet 12.5 mg  12.5 mg Oral BID     ______________________________________________________________________  EXPECTED

## 2024-08-06 LAB
BACTERIA SPEC CULT: NORMAL
EKG ATRIAL RATE: 93 BPM
EKG DIAGNOSIS: NORMAL
EKG P AXIS: 61 DEGREES
EKG P-R INTERVAL: 148 MS
EKG Q-T INTERVAL: 370 MS
EKG QRS DURATION: 94 MS
EKG QTC CALCULATION (BAZETT): 460 MS
EKG R AXIS: 31 DEGREES
EKG T AXIS: 68 DEGREES
EKG VENTRICULAR RATE: 93 BPM
GRAM STN SPEC: NORMAL
L PNEUMO1 AG UR QL IA: NEGATIVE
SERVICE CMNT-IMP: NORMAL
SPECIMEN SOURCE: NORMAL

## 2024-08-06 PROCEDURE — 93010 ELECTROCARDIOGRAM REPORT: CPT | Performed by: SPECIALIST

## 2024-08-07 ENCOUNTER — TELEPHONE (OUTPATIENT)
Age: 54
End: 2024-08-07

## 2024-08-07 NOTE — TELEPHONE ENCOUNTER
Returned call to pt.  Offered 8/13/24 @ 820am  and 8/15/24@ 820a, pt declined , will keep 8/30/24 appointment

## 2024-08-07 NOTE — TELEPHONE ENCOUNTER
Patient needs a hospital follow  up for pneumonia sooner than 8/30/24. Patient can be reached at  [phone].

## 2024-08-07 NOTE — TELEPHONE ENCOUNTER
Care Transitions Initial Follow Up Call    Outreach made within 2 business days of discharge: Yes    Patient: Sofia Rea Patient : 1970   MRN: [id number]  Reason for Admission: PNEUMONIA  Discharge Date: 24       Spoke with: NURSE SPOKE TO PATIENT     Discharge department/facility: [facility] Patient Contact:  Was patient able to fill all prescriptions: Yes  Was patient instructed to bring all medications to the follow-up visit: Yes  Is patient taking all medications as directed in the discharge summary? Yes  Does patient understand their discharge instructions: Yes  Does patient have questions or concerns that need addressed prior to 7-14 day follow up office visit: NO     Additional needs identified to be addressed with provider  No needs identified    Nurse Offered 24 @ 820am and 8/15/24@ 820a, pt declined , will keep 24 appointment          Scheduled appointment with PCP within 7-14 days    Follow Up  Future Appointments   Date Time Provider Department Center   2024  2:00 PM Jose Maria Lopez MD [facility] DEP       Twan Beltran MA

## 2024-08-08 ENCOUNTER — TELEPHONE (OUTPATIENT)
Age: 54
End: 2024-08-08

## 2024-08-08 NOTE — TELEPHONE ENCOUNTER
Care Transitions Initial Follow Up Call    Outreach made within 2 business days of discharge: Yes    Patient: Sofia Rea Patient : 1970   MRN: [id number]  Reason for Admission: pneumonia  Discharge Date: 24       Spoke with: patient    Discharge department/facility: [facility] Patient Contact:  Was patient able to fill all prescriptions: Yes  Was patient instructed to bring all medications to the follow-up visit: Yes  Is patient taking all medications as directed in the discharge summary? Yes  Does patient understand their discharge instructions: Yes  Does patient have questions or concerns that need addressed prior to 7-14 day follow up office visit: no    Additional needs identified to be addressed with provider  No needs identified             Scheduled appointment with PCP within 7-14 days    Follow Up  Future Appointments   Date Time Provider Department Center   2024  2:00 PM Jose Maria Lopez MD [facility] DEP       Odalys Acosta

## 2024-08-12 ENCOUNTER — TELEPHONE (OUTPATIENT)
Age: 54
End: 2024-08-12

## 2024-08-12 NOTE — TELEPHONE ENCOUNTER
Returned call to pt,  stated she would like to keep 8/30/24 appointment,  has upcoming appointment with pulmonology on 8/29/24

## 2024-08-12 NOTE — TELEPHONE ENCOUNTER
Patient would like a call back at [phone] to discuss getting  hospital follow and ED follow for pneumonia. Patient has an appointment on 8/30/24 but would like to be seen sooner, and would accept a Virtual Visit.

## 2024-08-15 NOTE — PROGRESS NOTES
Physician Progress Note      PATIENT:               CAN SWEET  CSN #:                  [id number]  :                       1970  ADMIT DATE:       8/3/2024 3:24 AM  DISCH DATE:        2024 12:27 PM  RESPONDING  PROVIDER #:        Nicholas Gill MD          QUERY TEXT:    Good Afternoon    This patient admitted on 2024 for pneumonia.    If possible, in your medical opinion, and based on the clinical treatment, can   you please document in the progress notes and discharge summary further the   type of the pneumonia  you are evaluating and/or treating any of the   following:    Note: CAP and HCAP indicate where the pneumonia was acquired, not a specific   type.    The medical record reflects the following:  Risk Factors: Cocaine and alcohol and amphetamine abuse, Smoker , COPD  Clinical Indicators: To ER with c/o SOB. CTA chest with left lower lobe   suggesting \"atypical/viral infection. COVOID and Influenza negative. WBC @   14.7--14.4  +Grayish phlegm and weakness, fatigue.  Treatment: Pt was tx with IV antibiotics, IV azithromycin IV ceftriaxone, o2 ,   oximetry, CTA chest,    Thank you  DAYANA FigueroaN,RN, CPHQ, CCDS, SMART  Options provided:  -- Viral pneumonia  -- Gram negative pneumonia  -- Aspiration pneumonia  -- Bacterial pneumonia  -- Other - I will add my own diagnosis  -- Disagree - Not applicable / Not valid  -- Disagree - Clinically unable to determine / Unknown  -- Refer to Clinical Documentation Reviewer    PROVIDER RESPONSE TEXT:    This patient has unspecified bacterial pneumonia.    Query created by: Chasity Vale on 2024 12:33 PM      QUERY TEXT:    Good Afternoon    This patient admitted on 2024- for Pneumonia.    The medical record also notes a diagnosis of Acute hypoxic respiratory   failure.    While the documentation in the medical record does support the hypoxia in    order to support the diagnosis of acute respiratory failure, please include   additional  If you are a smoker, it is important for your health to stop smoking. Please be aware that second hand smoke is also harmful.

## 2024-08-30 ENCOUNTER — OFFICE VISIT (OUTPATIENT)
Age: 54
End: 2024-08-30
Payer: COMMERCIAL

## 2024-08-30 VITALS
TEMPERATURE: 98.2 F | BODY MASS INDEX: 28.55 KG/M2 | WEIGHT: 181.88 LBS | SYSTOLIC BLOOD PRESSURE: 138 MMHG | HEART RATE: 89 BPM | RESPIRATION RATE: 21 BRPM | OXYGEN SATURATION: 99 % | DIASTOLIC BLOOD PRESSURE: 80 MMHG | HEIGHT: 67 IN

## 2024-08-30 DIAGNOSIS — E11.22 TYPE 2 DIABETES MELLITUS WITH STAGE 3 CHRONIC KIDNEY DISEASE, WITHOUT LONG-TERM CURRENT USE OF INSULIN, UNSPECIFIED WHETHER STAGE 3A OR 3B CKD (HCC): Primary | ICD-10-CM

## 2024-08-30 DIAGNOSIS — N18.30 TYPE 2 DIABETES MELLITUS WITH STAGE 3 CHRONIC KIDNEY DISEASE, WITHOUT LONG-TERM CURRENT USE OF INSULIN, UNSPECIFIED WHETHER STAGE 3A OR 3B CKD (HCC): Primary | ICD-10-CM

## 2024-08-30 DIAGNOSIS — F51.05 INSOMNIA DUE TO OTHER MENTAL DISORDER: ICD-10-CM

## 2024-08-30 DIAGNOSIS — F32.1 MODERATE MAJOR DEPRESSION (HCC): ICD-10-CM

## 2024-08-30 DIAGNOSIS — I47.10 SVT (SUPRAVENTRICULAR TACHYCARDIA) (HCC): ICD-10-CM

## 2024-08-30 DIAGNOSIS — I1A.0 RESISTANT HYPERTENSION: ICD-10-CM

## 2024-08-30 DIAGNOSIS — F99 INSOMNIA DUE TO OTHER MENTAL DISORDER: ICD-10-CM

## 2024-08-30 PROCEDURE — 99214 OFFICE O/P EST MOD 30 MIN: CPT | Performed by: STUDENT IN AN ORGANIZED HEALTH CARE EDUCATION/TRAINING PROGRAM

## 2024-08-30 PROCEDURE — 3044F HG A1C LEVEL LT 7.0%: CPT | Performed by: STUDENT IN AN ORGANIZED HEALTH CARE EDUCATION/TRAINING PROGRAM

## 2024-08-30 PROCEDURE — 3075F SYST BP GE 130 - 139MM HG: CPT | Performed by: STUDENT IN AN ORGANIZED HEALTH CARE EDUCATION/TRAINING PROGRAM

## 2024-08-30 PROCEDURE — 3079F DIAST BP 80-89 MM HG: CPT | Performed by: STUDENT IN AN ORGANIZED HEALTH CARE EDUCATION/TRAINING PROGRAM

## 2024-08-30 RX ORDER — QUETIAPINE FUMARATE 50 MG/1
50 TABLET, EXTENDED RELEASE ORAL NIGHTLY
Qty: 90 TABLET | Refills: 0 | Status: SHIPPED | OUTPATIENT
Start: 2024-08-30

## 2024-08-30 RX ORDER — AMLODIPINE BESYLATE 10 MG/1
10 TABLET ORAL EVERY MORNING
Qty: 90 TABLET | Refills: 1 | Status: SHIPPED | OUTPATIENT
Start: 2024-08-30

## 2024-08-30 RX ORDER — METOPROLOL TARTRATE 25 MG/1
25 TABLET, FILM COATED ORAL 2 TIMES DAILY
Qty: 180 TABLET | Refills: 1 | Status: SHIPPED | OUTPATIENT
Start: 2024-08-30

## 2024-08-30 RX ORDER — CHLORTHALIDONE 25 MG/1
25 TABLET ORAL DAILY
Qty: 90 TABLET | Refills: 2 | Status: SHIPPED | OUTPATIENT
Start: 2024-08-30

## 2024-08-30 RX ORDER — BUPROPION HYDROCHLORIDE 150 MG/1
TABLET ORAL
Qty: 180 TABLET | Refills: 2 | Status: SHIPPED | OUTPATIENT
Start: 2024-08-30

## 2024-08-30 SDOH — ECONOMIC STABILITY: FOOD INSECURITY: WITHIN THE PAST 12 MONTHS, YOU WORRIED THAT YOUR FOOD WOULD RUN OUT BEFORE YOU GOT MONEY TO BUY MORE.: NEVER TRUE

## 2024-08-30 SDOH — ECONOMIC STABILITY: FOOD INSECURITY: WITHIN THE PAST 12 MONTHS, THE FOOD YOU BOUGHT JUST DIDN'T LAST AND YOU DIDN'T HAVE MONEY TO GET MORE.: NEVER TRUE

## 2024-08-30 SDOH — ECONOMIC STABILITY: INCOME INSECURITY: HOW HARD IS IT FOR YOU TO PAY FOR THE VERY BASICS LIKE FOOD, HOUSING, MEDICAL CARE, AND HEATING?: NOT VERY HARD

## 2024-08-30 SDOH — ECONOMIC STABILITY: TRANSPORTATION INSECURITY
IN THE PAST 12 MONTHS, HAS LACK OF TRANSPORTATION KEPT YOU FROM MEETINGS, WORK, OR FROM GETTING THINGS NEEDED FOR DAILY LIVING?: NO

## 2024-08-30 ASSESSMENT — PATIENT HEALTH QUESTIONNAIRE - PHQ9
SUM OF ALL RESPONSES TO PHQ9 QUESTIONS 1 & 2: 0
6. FEELING BAD ABOUT YOURSELF - OR THAT YOU ARE A FAILURE OR HAVE LET YOURSELF OR YOUR FAMILY DOWN: NOT AT ALL
1. LITTLE INTEREST OR PLEASURE IN DOING THINGS: NOT AT ALL
9. THOUGHTS THAT YOU WOULD BE BETTER OFF DEAD, OR OF HURTING YOURSELF: NOT AT ALL
3. TROUBLE FALLING OR STAYING ASLEEP: NOT AT ALL
SUM OF ALL RESPONSES TO PHQ QUESTIONS 1-9: 0
10. IF YOU CHECKED OFF ANY PROBLEMS, HOW DIFFICULT HAVE THESE PROBLEMS MADE IT FOR YOU TO DO YOUR WORK, TAKE CARE OF THINGS AT HOME, OR GET ALONG WITH OTHER PEOPLE: NOT DIFFICULT AT ALL
7. TROUBLE CONCENTRATING ON THINGS, SUCH AS READING THE NEWSPAPER OR WATCHING TELEVISION: NOT AT ALL
2. FEELING DOWN, DEPRESSED OR HOPELESS: NOT AT ALL
8. MOVING OR SPEAKING SO SLOWLY THAT OTHER PEOPLE COULD HAVE NOTICED. OR THE OPPOSITE, BEING SO FIGETY OR RESTLESS THAT YOU HAVE BEEN MOVING AROUND A LOT MORE THAN USUAL: NOT AT ALL
SUM OF ALL RESPONSES TO PHQ QUESTIONS 1-9: 0
5. POOR APPETITE OR OVEREATING: NOT AT ALL
SUM OF ALL RESPONSES TO PHQ QUESTIONS 1-9: 0
SUM OF ALL RESPONSES TO PHQ QUESTIONS 1-9: 0
4. FEELING TIRED OR HAVING LITTLE ENERGY: NOT AT ALL

## 2024-08-30 ASSESSMENT — ANXIETY QUESTIONNAIRES
6. BECOMING EASILY ANNOYED OR IRRITABLE: NOT AT ALL
7. FEELING AFRAID AS IF SOMETHING AWFUL MIGHT HAPPEN: NOT AT ALL
GAD7 TOTAL SCORE: 0
4. TROUBLE RELAXING: NOT AT ALL
GAD7 TOTAL SCORE: 0
5. BEING SO RESTLESS THAT IT IS HARD TO SIT STILL: NOT AT ALL
1. FEELING NERVOUS, ANXIOUS, OR ON EDGE: NOT AT ALL
2. NOT BEING ABLE TO STOP OR CONTROL WORRYING: NOT AT ALL
IF YOU CHECKED OFF ANY PROBLEMS ON THIS QUESTIONNAIRE, HOW DIFFICULT HAVE THESE PROBLEMS MADE IT FOR YOU TO DO YOUR WORK, TAKE CARE OF THINGS AT HOME, OR GET ALONG WITH OTHER PEOPLE: NOT DIFFICULT AT ALL
3. WORRYING TOO MUCH ABOUT DIFFERENT THINGS: NOT AT ALL

## 2024-08-30 NOTE — PROGRESS NOTES
Chief Complaint   Patient presents with    Follow-up         Here for a follow up      \"Have you been to the ER, urgent care clinic since your last visit?  Hospitalized since your last visit?\"    NO    “Have you seen or consulted any other health care providers outside of [facility] since your last visit?”    NO    Have you had a mammogram?”   NO    Date of last Mammogram: 3/9/2022         “Have you had a colorectal cancer screening such as a colonoscopy/FIT/Cologuard?    NO    No colonoscopy on file  No cologuard on file  No FIT/FOBT on file   No flexible sigmoidoscopy on file         Click Here for Release of Records Request

## 2024-08-30 NOTE — ASSESSMENT & PLAN NOTE
Diagnosed in the ER (8/2024) with A1C of 6.5%. will continue metformin. Continue lifestyle measures

## 2024-08-30 NOTE — PROGRESS NOTES
[facility]  [address] [place] [address] [phone]    Chief Complaint: Chronic medical problems    Subjective  Sofia Rea is a 54 y.o. White (non-) female , established patient, here for evaluation of the concern(s) above;    PMHx: Poorly controlled HTN, SVT, noncompliance, depression/axiety, nicotine use disorder here for follow up    HTN:  Has h/o severe/uncontrolled BP, now under control with medications.  On Amlodipine, chlorthalidone and metoprolol.     Pt is doing well on current meds with no medication side effects noted.   No TIA's, no chest pain on exertion, no dyspnea on exertion, no swelling of ankles.   Exercising -walking, staying active      Depression and Anxiety:  States that she has been feeling more mood swings over the past few weeks and also has insomnia.  She is currently on Wellbutrin 150mg BID and states that she is doing great despite the mood swings.  States that she feels like a different person.  -Any suicidal ideation: no  -Any homicidal ideation: no     Pt denies any  fever, chill, night sweats, chest pain, pressure, SOB. Intermittent GO due to her COPD.     Social History  Living situation: Lives with her mom.  Relationship status:single. Has 3 children; 27, 23 and 18 yo. Non of the kids live with her  Smoking - 1 ppd  Legal issues: no  Occupation: previously working at [employer] but lost her job. Looking for another job  Trauma: Youngest child's father  and it was hard on her.    Allergies - reviewed:   Allergies   Allergen Reactions    Hydromorphone Itching     morphine    Morphine Itching    Hydralazine Rash     Other reaction(s): Unknown (comments)  jitteriness    Sulfa Antibiotics Nausea And Vomiting       Past Medical History - reviewed:  Past Medical History:   Diagnosis Date    Anemia 2012    Cocaine abuse affecting pregnancy (HCC)     Essential hypertension     Hypertension

## 2024-08-30 NOTE — PATIENT INSTRUCTIONS
THESE ARE THE STAGES OF KIDNEY DISEASE    Stage 1 with normal or high GFR (GFR > 90 mL/min)    Stage 2 Mild CKD (GFR = 60-89 mL/min)    Stage 3A Moderate CKD (GFR = 45-59 mL/min)    Stage 3B Moderate CKD (GFR = 30-44 mL/min)    Stage 4 Severe CKD (GFR = 15-29 mL/min)      Stage 5 End Stage CKD (GFR <15 mL/min)      YOUR STAGE IS 3    -Avoid anti-inflammatory medicines like ibuprofen and aleve.    -Work on your weight with diet and exercise.    -Stay well hydrated with ~5-6 glasses of water per day.    -Maximize blood pressure and/or diabetes management  -Avoid smoking

## 2024-09-06 ENCOUNTER — TRANSCRIBE ORDERS (OUTPATIENT)
Facility: HOSPITAL | Age: 54
End: 2024-09-06

## 2024-09-06 DIAGNOSIS — Z12.31 SCREENING MAMMOGRAM FOR BREAST CANCER: Primary | ICD-10-CM

## 2024-12-04 ENCOUNTER — HOSPITAL ENCOUNTER (OUTPATIENT)
Facility: HOSPITAL | Age: 54
Discharge: HOME | End: 2024-12-07
Attending: INTERNAL MEDICINE
Payer: COMMERCIAL

## 2024-12-04 DIAGNOSIS — R91.8 MULTIPLE PULMONARY NODULES: ICD-10-CM

## 2024-12-04 PROCEDURE — 71250 CT THORAX DX C-: CPT

## 2024-12-27 ENCOUNTER — TELEPHONE (OUTPATIENT)
Age: 54
End: 2024-12-27

## 2024-12-27 DIAGNOSIS — J44.9 CHRONIC OBSTRUCTIVE PULMONARY DISEASE, UNSPECIFIED COPD TYPE (HCC): ICD-10-CM

## 2024-12-27 RX ORDER — BUDESONIDE AND FORMOTEROL FUMARATE DIHYDRATE 160; 4.5 UG/1; UG/1
2 AEROSOL RESPIRATORY (INHALATION) 2 TIMES DAILY
Qty: 10.2 G | Refills: 5 | Status: SHIPPED | OUTPATIENT
Start: 2024-12-27

## 2024-12-27 NOTE — TELEPHONE ENCOUNTER
Last appointment: 8/30/24  Next appointment: 12/2/24 appt cancelled  Previous refill encounter(s): 2/1/24 #1 with 5 refills    Requested Prescriptions     Pending Prescriptions Disp Refills    budesonide-formoterol (SYMBICORT) 160-4.5 MCG/ACT AERO 10.2 g 5     Sig: Inhale 2 puffs into the lungs 2 times daily         For Pharmacy Admin Tracking Only    Program: Medication Refill  CPA in place:    Recommendation Provided To:   Intervention Detail: New Rx: 1, reason: Patient Preference  Intervention Accepted By:   Gap Closed?:    Time Spent (min): 5

## 2024-12-27 NOTE — TELEPHONE ENCOUNTER
----- Message from [employer] sent at 12/27/2024 10:42 AM EST -----  Regarding: [facility] Appointment Request  [facility] Appointment Request    Patient needs appointment for [facility] Appointment Type: Existing Condition Follow Up.    Patient Requested Dates(s): soonest as possible  Patient Requested Time: afternoon  Provider Name: Jose Maria Lopez MD    Reason for Appointment Request: Established Patient - Available appointments did not meet patient need    Follow up. Medication.    --------------------------------------------------------------------------------------------------------------------------    Relationship to Patient: Self     Call Back Information: OK to leave message on voicemail  Preferred Call Back Number: Phone [phone]

## 2025-01-17 NOTE — PROGRESS NOTES
Name and  Verified. Pharmacy verified    Chief Complaint   Patient presents with    Cough     Cogestion x 1 week     Yellow phlegm, sore throat. Taking Tylenol. 1. Have you been to the ER, urgent care clinic since your last visit? Hospitalized since your last visit? No    2. Have you seen or consulted any other health care providers outside of the [address] [place] [address] since your last visit? Include any pap smears or colon screening.  No     Health Maintenance Due   Topic Date Due    Pneumococcal 0-64 years (1 of 2 - PPSV23) Never done    Cervical cancer screen  Never done    Colorectal Cancer Screening Combo  Never done    COVID-19 Vaccine (3 - Booster for Pfizer series) 2022 chest wall non-tender, breathing is unlabored without accessory muscle use, normal breath sounds

## 2025-03-03 ENCOUNTER — TELEPHONE (OUTPATIENT)
Age: 55
End: 2025-03-03

## 2025-03-03 NOTE — TELEPHONE ENCOUNTER
Patient requesting  a call back to discuss getting refills but she does not know what refills are needed and will contact Pharmacy for assistance.

## 2025-03-31 DIAGNOSIS — I1A.0 RESISTANT HYPERTENSION: ICD-10-CM

## 2025-03-31 RX ORDER — AMLODIPINE BESYLATE 10 MG/1
10 TABLET ORAL EVERY MORNING
Qty: 30 TABLET | Refills: 0 | Status: SHIPPED | OUTPATIENT
Start: 2025-03-31

## 2025-03-31 NOTE — TELEPHONE ENCOUNTER
Last appointment: 8/30/24  Next appointment: none  Previous refill encounter(s): 8/30/24 #90 with 1 refill    Requested Prescriptions     Pending Prescriptions Disp Refills    amLODIPine (NORVASC) 10 MG tablet 30 tablet 0     Sig: Take 1 tablet by mouth every morning         For Pharmacy Admin Tracking Only    Program: Medication Refill  CPA in place:    Recommendation Provided To:   Intervention Detail: New Rx: 1, reason: Patient Preference  Intervention Accepted By:   Gap Closed?:    Time Spent (min): 5

## 2025-04-28 DIAGNOSIS — F99 INSOMNIA DUE TO OTHER MENTAL DISORDER: ICD-10-CM

## 2025-04-28 DIAGNOSIS — F32.1 MODERATE MAJOR DEPRESSION (HCC): ICD-10-CM

## 2025-04-28 DIAGNOSIS — F51.05 INSOMNIA DUE TO OTHER MENTAL DISORDER: ICD-10-CM

## 2025-04-28 RX ORDER — QUETIAPINE FUMARATE 50 MG/1
50 TABLET, EXTENDED RELEASE ORAL NIGHTLY
Qty: 90 TABLET | Refills: 0 | Status: SHIPPED | OUTPATIENT
Start: 2025-04-28

## 2025-04-28 NOTE — TELEPHONE ENCOUNTER
Last appointment: 8/30/24  Next appointment: 12/2/24 appt cancelled  Previous refill encounter(s): 8/30/24 #90    Requested Prescriptions     Pending Prescriptions Disp Refills    QUEtiapine (SEROQUEL XR) 50 MG extended release tablet 90 tablet 0     Sig: Take 1 tablet by mouth nightly         For Pharmacy Admin Tracking Only    Program: Medication Refill  CPA in place:    Recommendation Provided To:   Intervention Detail: New Rx: 1, reason: Patient Preference  Intervention Accepted By:   Gap Closed?:    Time Spent (min): 5